# Patient Record
Sex: MALE | Race: WHITE | NOT HISPANIC OR LATINO | URBAN - METROPOLITAN AREA
[De-identification: names, ages, dates, MRNs, and addresses within clinical notes are randomized per-mention and may not be internally consistent; named-entity substitution may affect disease eponyms.]

---

## 2017-01-18 ENCOUNTER — EMERGENCY (EMERGENCY)
Facility: HOSPITAL | Age: 82
LOS: 1 days | Discharge: HOME | End: 2017-01-18
Attending: EMERGENCY MEDICINE | Admitting: EMERGENCY MEDICINE
Payer: MEDICARE

## 2017-01-18 VITALS
HEART RATE: 78 BPM | DIASTOLIC BLOOD PRESSURE: 78 MMHG | OXYGEN SATURATION: 100 % | SYSTOLIC BLOOD PRESSURE: 156 MMHG | RESPIRATION RATE: 18 BRPM

## 2017-01-18 VITALS
TEMPERATURE: 98 F | SYSTOLIC BLOOD PRESSURE: 166 MMHG | RESPIRATION RATE: 19 BRPM | OXYGEN SATURATION: 97 % | HEART RATE: 70 BPM | DIASTOLIC BLOOD PRESSURE: 82 MMHG

## 2017-01-18 DIAGNOSIS — E78.00 PURE HYPERCHOLESTEROLEMIA, UNSPECIFIED: ICD-10-CM

## 2017-01-18 DIAGNOSIS — F02.80 DEMENTIA IN OTHER DISEASES CLASSIFIED ELSEWHERE, UNSPECIFIED SEVERITY, WITHOUT BEHAVIORAL DISTURBANCE, PSYCHOTIC DISTURBANCE, MOOD DISTURBANCE, AND ANXIETY: ICD-10-CM

## 2017-01-18 DIAGNOSIS — I10 ESSENTIAL (PRIMARY) HYPERTENSION: ICD-10-CM

## 2017-01-18 DIAGNOSIS — Z79.899 OTHER LONG TERM (CURRENT) DRUG THERAPY: ICD-10-CM

## 2017-01-18 DIAGNOSIS — Z79.82 LONG TERM (CURRENT) USE OF ASPIRIN: ICD-10-CM

## 2017-01-18 DIAGNOSIS — R53.1 WEAKNESS: ICD-10-CM

## 2017-01-18 DIAGNOSIS — Z87.891 PERSONAL HISTORY OF NICOTINE DEPENDENCE: ICD-10-CM

## 2017-01-18 DIAGNOSIS — Z88.5 ALLERGY STATUS TO NARCOTIC AGENT: ICD-10-CM

## 2017-01-18 DIAGNOSIS — E78.5 HYPERLIPIDEMIA, UNSPECIFIED: ICD-10-CM
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
## 2017-01-18 LAB
ALBUMIN SERPL ELPH-MCNC: 4.1 G/DL — SIGNIFICANT CHANGE UP (ref 3.3–5)
ALP SERPL-CCNC: 85 U/L — SIGNIFICANT CHANGE UP (ref 40–120)
ALT FLD-CCNC: 15 U/L RC — SIGNIFICANT CHANGE UP (ref 10–45)
ANION GAP SERPL CALC-SCNC: 13 MMOL/L — SIGNIFICANT CHANGE UP (ref 5–17)
APPEARANCE UR: ABNORMAL
AST SERPL-CCNC: 23 U/L — SIGNIFICANT CHANGE UP (ref 10–40)
BASOPHILS # BLD AUTO: 0 K/UL — SIGNIFICANT CHANGE UP (ref 0–0.2)
BASOPHILS NFR BLD AUTO: 0.3 % — SIGNIFICANT CHANGE UP (ref 0–2)
BILIRUB SERPL-MCNC: 0.7 MG/DL — SIGNIFICANT CHANGE UP (ref 0.2–1.2)
BILIRUB UR-MCNC: NEGATIVE — SIGNIFICANT CHANGE UP
BUN SERPL-MCNC: 30 MG/DL — HIGH (ref 7–23)
CALCIUM SERPL-MCNC: 9.2 MG/DL — SIGNIFICANT CHANGE UP (ref 8.4–10.5)
CHLORIDE SERPL-SCNC: 104 MMOL/L — SIGNIFICANT CHANGE UP (ref 96–108)
CK MB BLD-MCNC: 2 % — SIGNIFICANT CHANGE UP (ref 0–3.5)
CK MB CFR SERPL CALC: 3.6 NG/ML — SIGNIFICANT CHANGE UP (ref 0–6.7)
CK SERPL-CCNC: 182 U/L — SIGNIFICANT CHANGE UP (ref 30–200)
CO2 SERPL-SCNC: 24 MMOL/L — SIGNIFICANT CHANGE UP (ref 22–31)
COLOR SPEC: SIGNIFICANT CHANGE UP
CREAT SERPL-MCNC: 1.18 MG/DL — SIGNIFICANT CHANGE UP (ref 0.5–1.3)
DIFF PNL FLD: NEGATIVE — SIGNIFICANT CHANGE UP
EOSINOPHIL # BLD AUTO: 0.1 K/UL — SIGNIFICANT CHANGE UP (ref 0–0.5)
EOSINOPHIL NFR BLD AUTO: 0.5 % — SIGNIFICANT CHANGE UP (ref 0–6)
EPI CELLS # UR: SIGNIFICANT CHANGE UP /HPF
GLUCOSE SERPL-MCNC: 109 MG/DL — HIGH (ref 70–99)
GLUCOSE UR QL: NEGATIVE — SIGNIFICANT CHANGE UP
HCT VFR BLD CALC: 40 % — SIGNIFICANT CHANGE UP (ref 39–50)
HGB BLD-MCNC: 13.8 G/DL — SIGNIFICANT CHANGE UP (ref 13–17)
KETONES UR-MCNC: NEGATIVE — SIGNIFICANT CHANGE UP
LEUKOCYTE ESTERASE UR-ACNC: NEGATIVE — SIGNIFICANT CHANGE UP
LYMPHOCYTES # BLD AUTO: 1.2 K/UL — SIGNIFICANT CHANGE UP (ref 1–3.3)
LYMPHOCYTES # BLD AUTO: 12.5 % — LOW (ref 13–44)
MCHC RBC-ENTMCNC: 34 PG — SIGNIFICANT CHANGE UP (ref 27–34)
MCHC RBC-ENTMCNC: 34.6 GM/DL — SIGNIFICANT CHANGE UP (ref 32–36)
MCV RBC AUTO: 98.1 FL — SIGNIFICANT CHANGE UP (ref 80–100)
MONOCYTES # BLD AUTO: 1 K/UL — HIGH (ref 0–0.9)
MONOCYTES NFR BLD AUTO: 10.1 % — SIGNIFICANT CHANGE UP (ref 2–14)
NEUTROPHILS # BLD AUTO: 7.3 K/UL — SIGNIFICANT CHANGE UP (ref 1.8–7.4)
NEUTROPHILS NFR BLD AUTO: 76.6 % — SIGNIFICANT CHANGE UP (ref 43–77)
NITRITE UR-MCNC: NEGATIVE — SIGNIFICANT CHANGE UP
NT-PROBNP SERPL-SCNC: 470 PG/ML — HIGH (ref 0–300)
PH UR: 7 — SIGNIFICANT CHANGE UP (ref 4.8–8)
PLATELET # BLD AUTO: 135 K/UL — LOW (ref 150–400)
POTASSIUM SERPL-MCNC: 4.6 MMOL/L — SIGNIFICANT CHANGE UP (ref 3.5–5.3)
POTASSIUM SERPL-SCNC: 4.6 MMOL/L — SIGNIFICANT CHANGE UP (ref 3.5–5.3)
PROT SERPL-MCNC: 7.5 G/DL — SIGNIFICANT CHANGE UP (ref 6–8.3)
PROT UR-MCNC: SIGNIFICANT CHANGE UP
RBC # BLD: 4.07 M/UL — LOW (ref 4.2–5.8)
RBC # FLD: 13.4 % — SIGNIFICANT CHANGE UP (ref 10.3–14.5)
RBC CASTS # UR COMP ASSIST: SIGNIFICANT CHANGE UP /HPF (ref 0–2)
SODIUM SERPL-SCNC: 141 MMOL/L — SIGNIFICANT CHANGE UP (ref 135–145)
SP GR SPEC: 1.02 — SIGNIFICANT CHANGE UP (ref 1.01–1.02)
TROPONIN T SERPL-MCNC: <0.01 NG/ML — SIGNIFICANT CHANGE UP (ref 0–0.06)
UROBILINOGEN FLD QL: NEGATIVE — SIGNIFICANT CHANGE UP
WBC # BLD: 9.5 K/UL — SIGNIFICANT CHANGE UP (ref 3.8–10.5)
WBC # FLD AUTO: 9.5 K/UL — SIGNIFICANT CHANGE UP (ref 3.8–10.5)
WBC UR QL: SIGNIFICANT CHANGE UP /HPF (ref 0–5)

## 2017-01-18 PROCEDURE — 81001 URINALYSIS AUTO W/SCOPE: CPT

## 2017-01-18 PROCEDURE — 82553 CREATINE MB FRACTION: CPT

## 2017-01-18 PROCEDURE — 71010: CPT | Mod: 26

## 2017-01-18 PROCEDURE — 80053 COMPREHEN METABOLIC PANEL: CPT

## 2017-01-18 PROCEDURE — 71045 X-RAY EXAM CHEST 1 VIEW: CPT

## 2017-01-18 PROCEDURE — 84484 ASSAY OF TROPONIN QUANT: CPT

## 2017-01-18 PROCEDURE — 85027 COMPLETE CBC AUTOMATED: CPT

## 2017-01-18 PROCEDURE — 93971 EXTREMITY STUDY: CPT | Mod: 26

## 2017-01-18 PROCEDURE — 93005 ELECTROCARDIOGRAM TRACING: CPT

## 2017-01-18 PROCEDURE — 83880 ASSAY OF NATRIURETIC PEPTIDE: CPT

## 2017-01-18 PROCEDURE — 72170 X-RAY EXAM OF PELVIS: CPT

## 2017-01-18 PROCEDURE — 72170 X-RAY EXAM OF PELVIS: CPT | Mod: 26

## 2017-01-18 PROCEDURE — 82550 ASSAY OF CK (CPK): CPT

## 2017-01-18 PROCEDURE — 99284 EMERGENCY DEPT VISIT MOD MDM: CPT | Mod: 25

## 2017-01-18 PROCEDURE — 93971 EXTREMITY STUDY: CPT

## 2017-01-18 PROCEDURE — 99285 EMERGENCY DEPT VISIT HI MDM: CPT

## 2017-01-18 RX ORDER — SODIUM CHLORIDE 9 MG/ML
500 INJECTION INTRAMUSCULAR; INTRAVENOUS; SUBCUTANEOUS ONCE
Qty: 0 | Refills: 0 | Status: COMPLETED | OUTPATIENT
Start: 2017-01-18 | End: 2017-01-18

## 2017-01-18 RX ADMIN — SODIUM CHLORIDE 500 MILLILITER(S): 9 INJECTION INTRAMUSCULAR; INTRAVENOUS; SUBCUTANEOUS at 12:04

## 2017-01-18 NOTE — ED ADULT NURSE NOTE - PMH
Atrial fibrillation    Back pain    CAD (Coronary Artery Disease)    Dementia    Fall    HTN - Hypertension    Hypercholesteremia    UTI (urinary tract infection)  MDR E.coli

## 2017-01-18 NOTE — ED ADULT NURSE NOTE - OBJECTIVE STATEMENT
88 year old male brought in by ambulance a/ox3 currently to person, place and situation, with forgetfulness;  pmh baseline a/ox2 with dementia presenting to ed from [facility] assisted living s/p fall. no paperwork sent with patient from assisted living. patient states he was sitting in chair yesterday when he fell off. states someone helped him up. unable to verbalize how he fell, how long he was on the floor for, or who picked him up. currently denies any pain or discomfort when asked, denies n/v/cp/sob/dyspnea denies abd pain. unable to verbalize bowel/bladder pattern. respirations even unlabored no sob/dyspnea currently. noted with +1 pitting edema to right lower extremity. patient denies any pain to rle.

## 2017-01-18 NOTE — ED ADULT NURSE REASSESSMENT NOTE - NS ED NURSE REASSESS COMMENT FT1
sandwich and drink provided for patient, labs sent, fluids infusing, waiting for X-rays will continue to monitor.

## 2017-01-18 NOTE — ED PROVIDER NOTE - ATTENDING CONTRIBUTION TO CARE
I, Dr. Eh Zhong, interviewed the patient and performed a physical exam and spoke to the resident about the plan of care for this patient.  Pt with reported fall twice in NH, no complaints.

## 2017-01-18 NOTE — ED PROVIDER NOTE - OBJECTIVE STATEMENT
88M with PMHx of dementia, Afib not on AC, CAD s/p stents, HTN, HLD, BIBEMS after having a fall yesterday. Patient is poor historian and unable to provide an appropriate 88M with PMHx of dementia, Afib not on AC, CAD s/p stents, HTN, HLD, BIBEMS after having a fall yesterday. Patient is poor historian and unable to provide an appropriate history for recent fall, past medical history. Patient is alert and oriented x 2-3, and answering questions appropriately. Describes having difficulty with memory. States that he fell/slid off chair while waiting to be boosted up. Denies trauma to head, extremities. Denies chest pain/palpitations at the time. No LOC or seizure like activity. Does not remember other details, however, is unclear why he was sent in to the ED today. Denies pain currently, no headache, recent fevers, nausea, vomiting, diarrhea, constipation, difficulty with urination. Does report increased frequency of urination. States that he is hungry. Reports that several members of the assisted living facility in which he lives are also currently sick.

## 2017-01-18 NOTE — ED PROVIDER NOTE - HEAD, MLM
Head is atraumatic. Head shape is symmetrical. small healed abrasion on left forehead. no report of trauma

## 2017-01-18 NOTE — ED PROVIDER NOTE - PROGRESS NOTE DETAILS
Dr. Zhong Note: spoke to patient's son, Antonio Power, updated on results and likely discharge, okay with plan, cell: [phone] Dr. Zhong Note: u/s neg, stable for dc, ambulutte.

## 2017-01-18 NOTE — ED PROVIDER NOTE - CARE PLAN
Principal Discharge DX:	Fall, initial encounter  Secondary Diagnosis:	General weakness  Secondary Diagnosis:	Dementia Principal Discharge DX:	Fall, initial encounter  Goal:	maintain safety in assisted living facility  Secondary Diagnosis:	General weakness  Secondary Diagnosis:	Dementia

## 2017-04-12 ENCOUNTER — APPOINTMENT (OUTPATIENT)
Dept: CARDIOLOGY | Facility: CLINIC | Age: 82
End: 2017-04-12

## 2017-06-14 ENCOUNTER — NON-APPOINTMENT (OUTPATIENT)
Age: 82
End: 2017-06-14

## 2017-06-14 ENCOUNTER — APPOINTMENT (OUTPATIENT)
Dept: CARDIOLOGY | Facility: CLINIC | Age: 82
End: 2017-06-14

## 2017-06-14 VITALS
HEART RATE: 66 BPM | OXYGEN SATURATION: 97 % | HEIGHT: 71 IN | RESPIRATION RATE: 14 BRPM | BODY MASS INDEX: 25.9 KG/M2 | DIASTOLIC BLOOD PRESSURE: 77 MMHG | SYSTOLIC BLOOD PRESSURE: 129 MMHG | WEIGHT: 185 LBS

## 2017-06-14 DIAGNOSIS — I34.0 NONRHEUMATIC MITRAL (VALVE) INSUFFICIENCY: ICD-10-CM

## 2017-08-02 ENCOUNTER — EMERGENCY (EMERGENCY)
Facility: HOSPITAL | Age: 82
LOS: 1 days | Discharge: HOME | End: 2017-08-02
Attending: PERSONAL EMERGENCY RESPONSE ATTENDANT | Admitting: PERSONAL EMERGENCY RESPONSE ATTENDANT
Payer: MEDICARE

## 2017-08-02 VITALS
SYSTOLIC BLOOD PRESSURE: 145 MMHG | RESPIRATION RATE: 16 BRPM | DIASTOLIC BLOOD PRESSURE: 76 MMHG | HEART RATE: 56 BPM | OXYGEN SATURATION: 99 %

## 2017-08-02 VITALS
RESPIRATION RATE: 17 BRPM | SYSTOLIC BLOOD PRESSURE: 151 MMHG | DIASTOLIC BLOOD PRESSURE: 77 MMHG | OXYGEN SATURATION: 99 % | TEMPERATURE: 98 F | HEART RATE: 60 BPM

## 2017-08-02 LAB
ALBUMIN SERPL ELPH-MCNC: 3.8 G/DL — SIGNIFICANT CHANGE UP (ref 3.3–5)
ALP SERPL-CCNC: 68 U/L — SIGNIFICANT CHANGE UP (ref 40–120)
ALT FLD-CCNC: 14 U/L RC — SIGNIFICANT CHANGE UP (ref 10–45)
ANION GAP SERPL CALC-SCNC: 13 MMOL/L — SIGNIFICANT CHANGE UP (ref 5–17)
APPEARANCE UR: CLEAR — SIGNIFICANT CHANGE UP
AST SERPL-CCNC: 19 U/L — SIGNIFICANT CHANGE UP (ref 10–40)
BASOPHILS # BLD AUTO: 0 K/UL — SIGNIFICANT CHANGE UP (ref 0–0.2)
BASOPHILS NFR BLD AUTO: 0.5 % — SIGNIFICANT CHANGE UP (ref 0–2)
BILIRUB SERPL-MCNC: 0.7 MG/DL — SIGNIFICANT CHANGE UP (ref 0.2–1.2)
BILIRUB UR-MCNC: NEGATIVE — SIGNIFICANT CHANGE UP
BUN SERPL-MCNC: 24 MG/DL — HIGH (ref 7–23)
CALCIUM SERPL-MCNC: 9.3 MG/DL — SIGNIFICANT CHANGE UP (ref 8.4–10.5)
CHLORIDE SERPL-SCNC: 105 MMOL/L — SIGNIFICANT CHANGE UP (ref 96–108)
CK MB BLD-MCNC: 2.6 % — SIGNIFICANT CHANGE UP (ref 0–3.5)
CK MB CFR SERPL CALC: 3.5 NG/ML — SIGNIFICANT CHANGE UP (ref 0–6.7)
CK SERPL-CCNC: 135 U/L — SIGNIFICANT CHANGE UP (ref 30–200)
CO2 SERPL-SCNC: 25 MMOL/L — SIGNIFICANT CHANGE UP (ref 22–31)
COLOR SPEC: SIGNIFICANT CHANGE UP
CREAT SERPL-MCNC: 1.15 MG/DL — SIGNIFICANT CHANGE UP (ref 0.5–1.3)
DIFF PNL FLD: NEGATIVE — SIGNIFICANT CHANGE UP
EOSINOPHIL # BLD AUTO: 0.1 K/UL — SIGNIFICANT CHANGE UP (ref 0–0.5)
EOSINOPHIL NFR BLD AUTO: 0.7 % — SIGNIFICANT CHANGE UP (ref 0–6)
GAS PNL BLDV: SIGNIFICANT CHANGE UP
GLUCOSE SERPL-MCNC: 90 MG/DL — SIGNIFICANT CHANGE UP (ref 70–99)
GLUCOSE UR QL: NEGATIVE — SIGNIFICANT CHANGE UP
HCT VFR BLD CALC: 38.5 % — LOW (ref 39–50)
HGB BLD-MCNC: 13.1 G/DL — SIGNIFICANT CHANGE UP (ref 13–17)
HYALINE CASTS # UR AUTO: ABNORMAL
INR BLD: 1.1 RATIO — SIGNIFICANT CHANGE UP (ref 0.88–1.16)
KETONES UR-MCNC: NEGATIVE — SIGNIFICANT CHANGE UP
LEUKOCYTE ESTERASE UR-ACNC: NEGATIVE — SIGNIFICANT CHANGE UP
LYMPHOCYTES # BLD AUTO: 1.6 K/UL — SIGNIFICANT CHANGE UP (ref 1–3.3)
LYMPHOCYTES # BLD AUTO: 16.6 % — SIGNIFICANT CHANGE UP (ref 13–44)
MCHC RBC-ENTMCNC: 34 GM/DL — SIGNIFICANT CHANGE UP (ref 32–36)
MCHC RBC-ENTMCNC: 34.2 PG — HIGH (ref 27–34)
MCV RBC AUTO: 100 FL — SIGNIFICANT CHANGE UP (ref 80–100)
MONOCYTES # BLD AUTO: 0.9 K/UL — SIGNIFICANT CHANGE UP (ref 0–0.9)
MONOCYTES NFR BLD AUTO: 10.1 % — SIGNIFICANT CHANGE UP (ref 2–14)
NEUTROPHILS # BLD AUTO: 6.8 K/UL — SIGNIFICANT CHANGE UP (ref 1.8–7.4)
NEUTROPHILS NFR BLD AUTO: 72.1 % — SIGNIFICANT CHANGE UP (ref 43–77)
NITRITE UR-MCNC: NEGATIVE — SIGNIFICANT CHANGE UP
PH UR: 7.5 — SIGNIFICANT CHANGE UP (ref 5–8)
PLATELET # BLD AUTO: 148 K/UL — LOW (ref 150–400)
POTASSIUM SERPL-MCNC: 4.3 MMOL/L — SIGNIFICANT CHANGE UP (ref 3.5–5.3)
POTASSIUM SERPL-SCNC: 4.3 MMOL/L — SIGNIFICANT CHANGE UP (ref 3.5–5.3)
PROT SERPL-MCNC: 6.9 G/DL — SIGNIFICANT CHANGE UP (ref 6–8.3)
PROT UR-MCNC: SIGNIFICANT CHANGE UP
PROTHROM AB SERPL-ACNC: 11.9 SEC — SIGNIFICANT CHANGE UP (ref 9.8–12.7)
RBC # BLD: 3.83 M/UL — LOW (ref 4.2–5.8)
RBC # FLD: 12.7 % — SIGNIFICANT CHANGE UP (ref 10.3–14.5)
RBC CASTS # UR COMP ASSIST: SIGNIFICANT CHANGE UP /HPF (ref 0–2)
SODIUM SERPL-SCNC: 143 MMOL/L — SIGNIFICANT CHANGE UP (ref 135–145)
SP GR SPEC: 1.02 — SIGNIFICANT CHANGE UP (ref 1.01–1.02)
TROPONIN T SERPL-MCNC: <0.01 NG/ML — SIGNIFICANT CHANGE UP (ref 0–0.06)
UROBILINOGEN FLD QL: NEGATIVE — SIGNIFICANT CHANGE UP
WBC # BLD: 9.4 K/UL — SIGNIFICANT CHANGE UP (ref 3.8–10.5)
WBC # FLD AUTO: 9.4 K/UL — SIGNIFICANT CHANGE UP (ref 3.8–10.5)
WBC UR QL: SIGNIFICANT CHANGE UP /HPF (ref 0–5)

## 2017-08-02 PROCEDURE — 82947 ASSAY GLUCOSE BLOOD QUANT: CPT

## 2017-08-02 PROCEDURE — 96374 THER/PROPH/DIAG INJ IV PUSH: CPT | Mod: XU

## 2017-08-02 PROCEDURE — 82330 ASSAY OF CALCIUM: CPT

## 2017-08-02 PROCEDURE — 87040 BLOOD CULTURE FOR BACTERIA: CPT

## 2017-08-02 PROCEDURE — 82803 BLOOD GASES ANY COMBINATION: CPT

## 2017-08-02 PROCEDURE — 82435 ASSAY OF BLOOD CHLORIDE: CPT

## 2017-08-02 PROCEDURE — 72125 CT NECK SPINE W/O DYE: CPT

## 2017-08-02 PROCEDURE — 51701 INSERT BLADDER CATHETER: CPT

## 2017-08-02 PROCEDURE — 85014 HEMATOCRIT: CPT

## 2017-08-02 PROCEDURE — 82550 ASSAY OF CK (CPK): CPT

## 2017-08-02 PROCEDURE — 72125 CT NECK SPINE W/O DYE: CPT | Mod: 26

## 2017-08-02 PROCEDURE — 80053 COMPREHEN METABOLIC PANEL: CPT

## 2017-08-02 PROCEDURE — 99285 EMERGENCY DEPT VISIT HI MDM: CPT

## 2017-08-02 PROCEDURE — 87086 URINE CULTURE/COLONY COUNT: CPT

## 2017-08-02 PROCEDURE — 70450 CT HEAD/BRAIN W/O DYE: CPT

## 2017-08-02 PROCEDURE — 71045 X-RAY EXAM CHEST 1 VIEW: CPT

## 2017-08-02 PROCEDURE — 99284 EMERGENCY DEPT VISIT MOD MDM: CPT | Mod: 25

## 2017-08-02 PROCEDURE — 82553 CREATINE MB FRACTION: CPT

## 2017-08-02 PROCEDURE — 84484 ASSAY OF TROPONIN QUANT: CPT

## 2017-08-02 PROCEDURE — 82565 ASSAY OF CREATININE: CPT

## 2017-08-02 PROCEDURE — 85610 PROTHROMBIN TIME: CPT

## 2017-08-02 PROCEDURE — 70450 CT HEAD/BRAIN W/O DYE: CPT | Mod: 26

## 2017-08-02 PROCEDURE — 84295 ASSAY OF SERUM SODIUM: CPT

## 2017-08-02 PROCEDURE — 85027 COMPLETE CBC AUTOMATED: CPT

## 2017-08-02 PROCEDURE — 81001 URINALYSIS AUTO W/SCOPE: CPT

## 2017-08-02 PROCEDURE — 83605 ASSAY OF LACTIC ACID: CPT

## 2017-08-02 PROCEDURE — 71010: CPT | Mod: 26

## 2017-08-02 PROCEDURE — 84132 ASSAY OF SERUM POTASSIUM: CPT

## 2017-08-02 RX ORDER — SODIUM CHLORIDE 9 MG/ML
1000 INJECTION INTRAMUSCULAR; INTRAVENOUS; SUBCUTANEOUS ONCE
Qty: 0 | Refills: 0 | Status: COMPLETED | OUTPATIENT
Start: 2017-08-02 | End: 2017-08-02

## 2017-08-02 RX ORDER — ACETAMINOPHEN 500 MG
1000 TABLET ORAL ONCE
Qty: 0 | Refills: 0 | Status: COMPLETED | OUTPATIENT
Start: 2017-08-02 | End: 2017-08-02

## 2017-08-02 RX ADMIN — SODIUM CHLORIDE 2000 MILLILITER(S): 9 INJECTION INTRAMUSCULAR; INTRAVENOUS; SUBCUTANEOUS at 10:27

## 2017-08-02 RX ADMIN — Medication 400 MILLIGRAM(S): at 11:21

## 2017-08-02 NOTE — ED ADULT NURSE REASSESSMENT NOTE - NS ED NURSE REASSESS COMMENT FT1
pt resting comfortably denies any pain at this time hard collor removed by md pts son contacted by phone states he willpick up patient around 5 pm pt pending discharge

## 2017-08-02 NOTE — ED PROVIDER NOTE - PROGRESS NOTE DETAILS
CT spine negative for fracture. Pt cleared from [place] [facility] collar with FROM neck and ability to stand independently. - Gregory Branham PA-C Attending MD Wills.  Pt is in no distress.  C-collar cleared clinically after non-actionable imaging.  PT has a stage II sacral decub without evidence of active infection.  Facility where pt lives was called and is aware of planned d/c.  Pt fell last night, denies CP at this time.  Troponin, EKG non-actionable.  Son called who is en route to pick pt up and can stay with him overnight.  Will discuss plan of care further with pt and son when son arrives.  No planned further work-up at this time. I spoke with Dr. Franco regarding pt discharge plan. Doctor agrees as long is pt is able to ambulate with assist and will see in office next week.  - KAMILLE MagañaC Pt is able to ambulate with walker, and his son will take him home and stay with him overnight.    - KAMILLE MagañaC

## 2017-08-02 NOTE — ED ADULT NURSE NOTE - OBJECTIVE STATEMENT
pt sent from [facility] found on floor this morning aide with patient till 8pm last night pt finger stick 81mcg on arrival skin warm dry pt c/o left neck pain with hard collor applied on arrival pt with no recall of event preceding his arrival sinus breadycardia on moniter pt with stage 2 decubitus to sacral area no drainage [pt incontinent of urine on arrival cleaned placed on cardaic monitering pending md jiménez

## 2017-08-02 NOTE — ED PROVIDER NOTE - PHYSICAL EXAMINATION
87yo M A&Ox3, no apparent distress. Mild Parkinsonian tremors noted. No obvious sign of injury or deformity. +ttp midline c spine. 2nd degree sacral decubitus ulcer cleanly bandaged. Heart RRR no murmur. Lungs CTA b/l. Abdomen soft nontender nondistended, no CVAT. Peripheral pulses present and equal. 89yo M A&Ox3, no apparent distress. CN 2-12 grossly intact. No focal neurologic deficit. Mild Parkinsonian tremors noted. No obvious sign of injury or deformity. +ttp midline c spine. 2nd degree sacral decubitus ulcer cleanly bandaged. Heart RRR no murmur. Lungs CTA b/l. Abdomen soft nontender nondistended, no CVAT. Peripheral pulses present and equal.

## 2017-08-02 NOTE — ED PROVIDER NOTE - ATTENDING CONTRIBUTION TO CARE
Attending MD Wills.  Pt is a presenting after a fall last night ~11 pm.  Pt endorses lower back pain.  [facility] assisted [facility].  Pt stated he fell ~11 pm.  Pt found on floor this morning.  Pt is A & O x 3 but doesn't remember exactly what happened.  Pt endorses some blurred vision. No AC on board.  Pt has parkinson's disease.  Pt has no focal areas of TTP other than midline and bilateral paraspinal TTP.  Pt has a stage II sacral decub.  No CP/abdominal TTP.  No LE shortening/rotation.  Neurovascularly intact in all distal extremities.  Stable vital signs.  Pt is in no acute distress. No external evidence of head injury.  CK is not elevated.  Initial labs non-actionable.

## 2017-08-02 NOTE — ED PROVIDER NOTE - PLAN OF CARE
- Follow up with your primary care physician tomorrow  - Take Tylenol 975mg every 6 hours as needed for pain  - Return to ER if you experience worsening pain, loss of sensation or ability to move, or sign of infection such as high fever

## 2017-08-02 NOTE — ED ADULT NURSE REASSESSMENT NOTE - NS ED NURSE REASSESS COMMENT FT1
pt straight cath aseptcically with 400ml urine drained amara colored pt toleraterd procedure well pending radiology studies

## 2017-08-02 NOTE — ED PROVIDER NOTE - OBJECTIVE STATEMENT
87 yo M 87 yo M BIBA from assisted living after falling unwitnessed last night, discovered on his floor by aide this morning. 87 yo M BIBA from assisted living after falling unwitnessed last night, discovered on his floor by aide this morning. Pt is poor historian and arrived to ED without paperwork. Reports PMHx Parkinson's. PCP Jimmy Franco. c/o neck pain. Pt denies anticoagulants. Denies fever, chills, recent illness, abdominal pain, rib pain, any obvious signs of injury. Reports sacral decubitus ulcer.

## 2017-08-02 NOTE — ED PROVIDER NOTE - CARE PLAN
Principal Discharge DX:	Back pain  Secondary Diagnosis:	Fall from standing, initial encounter Principal Discharge DX:	Back pain  Instructions for follow-up, activity and diet:	- Follow up with your primary care physician tomorrow  - Take Tylenol 975mg every 6 hours as needed for pain  - Return to ER if you experience worsening pain, loss of sensation or ability to move, or sign of infection such as high fever  Secondary Diagnosis:	Fall from standing, initial encounter

## 2017-08-03 LAB
CULTURE RESULTS: NO GROWTH — SIGNIFICANT CHANGE UP
SPECIMEN SOURCE: SIGNIFICANT CHANGE UP

## 2017-08-07 LAB
CULTURE RESULTS: SIGNIFICANT CHANGE UP
CULTURE RESULTS: SIGNIFICANT CHANGE UP
SPECIMEN SOURCE: SIGNIFICANT CHANGE UP
SPECIMEN SOURCE: SIGNIFICANT CHANGE UP

## 2018-02-21 ENCOUNTER — APPOINTMENT (OUTPATIENT)
Dept: CARDIOLOGY | Facility: CLINIC | Age: 83
End: 2018-02-21

## 2018-03-20 ENCOUNTER — MEDICATION RENEWAL (OUTPATIENT)
Age: 83
End: 2018-03-20

## 2018-03-20 RX ORDER — RIVASTIGMINE 9.5 MG/24H
9.5 PATCH, EXTENDED RELEASE TRANSDERMAL
Refills: 0 | Status: ACTIVE | COMMUNITY

## 2018-03-20 RX ORDER — CLOPIDOGREL BISULFATE 75 MG/1
75 TABLET, FILM COATED ORAL DAILY
Qty: 90 | Refills: 3 | Status: ACTIVE | COMMUNITY
Start: 1900-01-01 | End: 1900-01-01

## 2018-05-02 ENCOUNTER — NON-APPOINTMENT (OUTPATIENT)
Age: 83
End: 2018-05-02

## 2018-05-02 ENCOUNTER — APPOINTMENT (OUTPATIENT)
Dept: CARDIOLOGY | Facility: CLINIC | Age: 83
End: 2018-05-02
Payer: MEDICARE

## 2018-05-02 VITALS
WEIGHT: 185 LBS | DIASTOLIC BLOOD PRESSURE: 77 MMHG | BODY MASS INDEX: 25.9 KG/M2 | HEART RATE: 74 BPM | SYSTOLIC BLOOD PRESSURE: 135 MMHG | OXYGEN SATURATION: 97 % | HEIGHT: 71 IN | RESPIRATION RATE: 14 BRPM

## 2018-05-02 DIAGNOSIS — N18.3 CHRONIC KIDNEY DISEASE, STAGE 3 (MODERATE): ICD-10-CM

## 2018-05-02 PROCEDURE — 99214 OFFICE O/P EST MOD 30 MIN: CPT

## 2018-05-02 PROCEDURE — 93000 ELECTROCARDIOGRAM COMPLETE: CPT

## 2018-05-04 PROBLEM — N18.3 CKD (CHRONIC KIDNEY DISEASE), STAGE III: Status: ACTIVE | Noted: 2018-05-04

## 2018-05-08 NOTE — ED ADULT TRIAGE NOTE - ESI TRIAGE ACUITY LEVEL, MLM
Pt was seen today and tested negative for strep.  After nap at 2:00, temp was 103.7.  Mom gave motrin and now it is down to 102.9.  Mom is wondering what kind of cough med she can give her for her cough.  She is also wondering, what temp is too high.    Lynette Copeland CMA ([facility])......................5/8/2018  2:36 PM    3

## 2018-05-10 ENCOUNTER — INPATIENT (INPATIENT)
Facility: HOSPITAL | Age: 83
LOS: 3 days | Discharge: HOME | DRG: 312 | End: 2018-05-14
Attending: INTERNAL MEDICINE | Admitting: INTERNAL MEDICINE
Payer: MEDICARE

## 2018-05-10 VITALS
SYSTOLIC BLOOD PRESSURE: 155 MMHG | RESPIRATION RATE: 20 BRPM | OXYGEN SATURATION: 98 % | HEART RATE: 70 BPM | DIASTOLIC BLOOD PRESSURE: 83 MMHG

## 2018-05-10 DIAGNOSIS — I10 ESSENTIAL (PRIMARY) HYPERTENSION: ICD-10-CM

## 2018-05-10 DIAGNOSIS — N39.0 URINARY TRACT INFECTION, SITE NOT SPECIFIED: ICD-10-CM

## 2018-05-10 DIAGNOSIS — I48.91 UNSPECIFIED ATRIAL FIBRILLATION: ICD-10-CM

## 2018-05-10 DIAGNOSIS — R55 SYNCOPE AND COLLAPSE: ICD-10-CM

## 2018-05-10 DIAGNOSIS — F03.90 UNSPECIFIED DEMENTIA WITHOUT BEHAVIORAL DISTURBANCE: ICD-10-CM

## 2018-05-10 LAB
ALBUMIN SERPL ELPH-MCNC: 4.1 G/DL — SIGNIFICANT CHANGE UP (ref 3.3–5)
ALP SERPL-CCNC: 75 U/L — SIGNIFICANT CHANGE UP (ref 40–120)
ALT FLD-CCNC: 13 U/L — SIGNIFICANT CHANGE UP (ref 10–45)
ANION GAP SERPL CALC-SCNC: 14 MMOL/L — SIGNIFICANT CHANGE UP (ref 5–17)
APTT BLD: 26.4 SEC — LOW (ref 27.5–37.4)
AST SERPL-CCNC: 19 U/L — SIGNIFICANT CHANGE UP (ref 10–40)
BASOPHILS # BLD AUTO: 0.1 K/UL — SIGNIFICANT CHANGE UP (ref 0–0.2)
BASOPHILS NFR BLD AUTO: 1 % — SIGNIFICANT CHANGE UP (ref 0–2)
BILIRUB SERPL-MCNC: 0.4 MG/DL — SIGNIFICANT CHANGE UP (ref 0.2–1.2)
BUN SERPL-MCNC: 27 MG/DL — HIGH (ref 7–23)
CALCIUM SERPL-MCNC: 9.5 MG/DL — SIGNIFICANT CHANGE UP (ref 8.4–10.5)
CHLORIDE SERPL-SCNC: 105 MMOL/L — SIGNIFICANT CHANGE UP (ref 96–108)
CK MB BLD-MCNC: 2.8 % — SIGNIFICANT CHANGE UP (ref 0–3.5)
CK MB CFR SERPL CALC: 2.9 NG/ML — SIGNIFICANT CHANGE UP (ref 0–6.7)
CK SERPL-CCNC: 102 U/L — SIGNIFICANT CHANGE UP (ref 30–200)
CO2 SERPL-SCNC: 23 MMOL/L — SIGNIFICANT CHANGE UP (ref 22–31)
CREAT SERPL-MCNC: 1.39 MG/DL — HIGH (ref 0.5–1.3)
EOSINOPHIL # BLD AUTO: 0.2 K/UL — SIGNIFICANT CHANGE UP (ref 0–0.5)
EOSINOPHIL NFR BLD AUTO: 2.5 % — SIGNIFICANT CHANGE UP (ref 0–6)
ETHANOL SERPL-MCNC: SIGNIFICANT CHANGE UP MG/DL (ref 0–10)
GLUCOSE SERPL-MCNC: 109 MG/DL — HIGH (ref 70–99)
HCT VFR BLD CALC: 40.1 % — SIGNIFICANT CHANGE UP (ref 39–50)
HGB BLD-MCNC: 13.2 G/DL — SIGNIFICANT CHANGE UP (ref 13–17)
INR BLD: 1.07 RATIO — SIGNIFICANT CHANGE UP (ref 0.88–1.16)
LIDOCAIN IGE QN: 28 U/L — SIGNIFICANT CHANGE UP (ref 7–60)
LYMPHOCYTES # BLD AUTO: 1.8 K/UL — SIGNIFICANT CHANGE UP (ref 1–3.3)
LYMPHOCYTES # BLD AUTO: 24.3 % — SIGNIFICANT CHANGE UP (ref 13–44)
MCHC RBC-ENTMCNC: 32.9 PG — SIGNIFICANT CHANGE UP (ref 27–34)
MCHC RBC-ENTMCNC: 33 GM/DL — SIGNIFICANT CHANGE UP (ref 32–36)
MCV RBC AUTO: 99.7 FL — SIGNIFICANT CHANGE UP (ref 80–100)
MONOCYTES # BLD AUTO: 0.9 K/UL — SIGNIFICANT CHANGE UP (ref 0–0.9)
MONOCYTES NFR BLD AUTO: 11.8 % — SIGNIFICANT CHANGE UP (ref 2–14)
NEUTROPHILS # BLD AUTO: 4.5 K/UL — SIGNIFICANT CHANGE UP (ref 1.8–7.4)
NEUTROPHILS NFR BLD AUTO: 60.3 % — SIGNIFICANT CHANGE UP (ref 43–77)
PLATELET # BLD AUTO: 165 K/UL — SIGNIFICANT CHANGE UP (ref 150–400)
POTASSIUM SERPL-MCNC: 4.9 MMOL/L — SIGNIFICANT CHANGE UP (ref 3.5–5.3)
POTASSIUM SERPL-SCNC: 4.9 MMOL/L — SIGNIFICANT CHANGE UP (ref 3.5–5.3)
PROT SERPL-MCNC: 7.2 G/DL — SIGNIFICANT CHANGE UP (ref 6–8.3)
PROTHROM AB SERPL-ACNC: 11.6 SEC — SIGNIFICANT CHANGE UP (ref 9.8–12.7)
RBC # BLD: 4.02 M/UL — LOW (ref 4.2–5.8)
RBC # FLD: 12.9 % — SIGNIFICANT CHANGE UP (ref 10.3–14.5)
SODIUM SERPL-SCNC: 142 MMOL/L — SIGNIFICANT CHANGE UP (ref 135–145)
TROPONIN T SERPL-MCNC: <0.01 NG/ML — SIGNIFICANT CHANGE UP (ref 0–0.06)
WBC # BLD: 7.5 K/UL — SIGNIFICANT CHANGE UP (ref 3.8–10.5)
WBC # FLD AUTO: 7.5 K/UL — SIGNIFICANT CHANGE UP (ref 3.8–10.5)

## 2018-05-10 PROCEDURE — 70450 CT HEAD/BRAIN W/O DYE: CPT | Mod: 26

## 2018-05-10 PROCEDURE — 71045 X-RAY EXAM CHEST 1 VIEW: CPT | Mod: 26

## 2018-05-10 PROCEDURE — 73502 X-RAY EXAM HIP UNI 2-3 VIEWS: CPT | Mod: 26,RT

## 2018-05-10 PROCEDURE — 93010 ELECTROCARDIOGRAM REPORT: CPT

## 2018-05-10 PROCEDURE — 99285 EMERGENCY DEPT VISIT HI MDM: CPT | Mod: 25

## 2018-05-10 PROCEDURE — 72125 CT NECK SPINE W/O DYE: CPT | Mod: 26

## 2018-05-10 RX ORDER — FUROSEMIDE 40 MG
40 TABLET ORAL DAILY
Qty: 0 | Refills: 0 | Status: DISCONTINUED | OUTPATIENT
Start: 2018-05-10 | End: 2018-05-13

## 2018-05-10 RX ORDER — ATORVASTATIN CALCIUM 80 MG/1
10 TABLET, FILM COATED ORAL AT BEDTIME
Qty: 0 | Refills: 0 | Status: DISCONTINUED | OUTPATIENT
Start: 2018-05-10 | End: 2018-05-14

## 2018-05-10 RX ORDER — RIVASTIGMINE 4.6 MG/24H
1 PATCH, EXTENDED RELEASE TRANSDERMAL EVERY 24 HOURS
Qty: 0 | Refills: 0 | Status: DISCONTINUED | OUTPATIENT
Start: 2018-05-10 | End: 2018-05-14

## 2018-05-10 RX ORDER — SODIUM CHLORIDE 9 MG/ML
1000 INJECTION INTRAMUSCULAR; INTRAVENOUS; SUBCUTANEOUS ONCE
Qty: 0 | Refills: 0 | Status: COMPLETED | OUTPATIENT
Start: 2018-05-10 | End: 2018-05-10

## 2018-05-10 RX ORDER — METOPROLOL TARTRATE 50 MG
25 TABLET ORAL DAILY
Qty: 0 | Refills: 0 | Status: DISCONTINUED | OUTPATIENT
Start: 2018-05-10 | End: 2018-05-14

## 2018-05-10 RX ORDER — MIRABEGRON 50 MG/1
50 TABLET, EXTENDED RELEASE ORAL DAILY
Qty: 0 | Refills: 0 | Status: DISCONTINUED | OUTPATIENT
Start: 2018-05-10 | End: 2018-05-14

## 2018-05-10 RX ORDER — HEPARIN SODIUM 5000 [USP'U]/ML
5000 INJECTION INTRAVENOUS; SUBCUTANEOUS EVERY 12 HOURS
Qty: 0 | Refills: 0 | Status: DISCONTINUED | OUTPATIENT
Start: 2018-05-10 | End: 2018-05-14

## 2018-05-10 RX ORDER — CLOPIDOGREL BISULFATE 75 MG/1
75 TABLET, FILM COATED ORAL DAILY
Qty: 0 | Refills: 0 | Status: DISCONTINUED | OUTPATIENT
Start: 2018-05-10 | End: 2018-05-14

## 2018-05-10 RX ORDER — ACETAMINOPHEN 500 MG
1000 TABLET ORAL ONCE
Qty: 0 | Refills: 0 | Status: COMPLETED | OUTPATIENT
Start: 2018-05-10 | End: 2018-05-10

## 2018-05-10 RX ORDER — OMEGA-3 ACID ETHYL ESTERS 1 G
2 CAPSULE ORAL
Qty: 0 | Refills: 0 | Status: DISCONTINUED | OUTPATIENT
Start: 2018-05-10 | End: 2018-05-14

## 2018-05-10 RX ADMIN — Medication 25 MILLIGRAM(S): at 18:25

## 2018-05-10 RX ADMIN — Medication 1000 MILLIGRAM(S): at 05:30

## 2018-05-10 RX ADMIN — Medication 400 MILLIGRAM(S): at 04:30

## 2018-05-10 RX ADMIN — ATORVASTATIN CALCIUM 10 MILLIGRAM(S): 80 TABLET, FILM COATED ORAL at 21:46

## 2018-05-10 RX ADMIN — RIVASTIGMINE 1 PATCH: 4.6 PATCH, EXTENDED RELEASE TRANSDERMAL at 21:47

## 2018-05-10 RX ADMIN — SODIUM CHLORIDE 2000 MILLILITER(S): 9 INJECTION INTRAMUSCULAR; INTRAVENOUS; SUBCUTANEOUS at 04:35

## 2018-05-10 RX ADMIN — MIRABEGRON 50 MILLIGRAM(S): 50 TABLET, EXTENDED RELEASE ORAL at 21:46

## 2018-05-10 RX ADMIN — HEPARIN SODIUM 5000 UNIT(S): 5000 INJECTION INTRAVENOUS; SUBCUTANEOUS at 21:46

## 2018-05-10 RX ADMIN — CLOPIDOGREL BISULFATE 75 MILLIGRAM(S): 75 TABLET, FILM COATED ORAL at 18:25

## 2018-05-10 NOTE — ED PROVIDER NOTE - MEDICAL DECISION MAKING DETAILS
Rey Osei MD (resident): unknown mechanism of fall w/ head injury, and neck pain. pt on plavix. will get CT and XR imaging. pain control. work-up for syncope and BLE swelling. Rey Osei MD (resident): unknown mechanism of fall w/ head injury, and neck pain. pt on plavix. will get CT and XR imaging. pain control. work-up for syncope and BLE swelling. pt w/ evidence of movement disorder, uncertain about time frame of this.

## 2018-05-10 NOTE — ED PROVIDER NOTE - ATTENDING CONTRIBUTION TO CARE
I was physically present for the E/M service provided. I agree with above history, physical, and plan which I have reviewed and edited where appropriate. I was physically present for the key portions of the service provided.    89 M w/ Hx of CAD s/p PCI x2 on Plavix BIBEMS after fall vs syncope. Afebrile. A&Ox2. CN II-XII intact. Moves all extremities without lateralization. No mid-line c-spine TTP. Lungs CTA. Pelvis stable. Trauma imaging for fall on Plavix: CT Head, C-Spine, CXR, Pelvic XR. r/o cardiogenic syncope: EKG, electrolytes troponin, BNP.

## 2018-05-10 NOTE — ED PROVIDER NOTE - OBJECTIVE STATEMENT
Rey Osei MD (resident): 89 M w/ Hx of CAD s/p PCI x2 on plavix, who was BIBEMS from [facility] for fall. Pt was found on the floor by EMS. Unknown mechanism of the fall. Pt was found on his back. Pt reports that he was on the floor for several hours. Does not know if he had LOC. Pt reports neck pain and R hip. AFib, but no A/C reported.    PMD: Salvador

## 2018-05-10 NOTE — H&P ADULT - NSHPLABSRESULTS_GEN_ALL_CORE
13.2   7.5   )-----------( 165      ( 10 May 2018 04:41 )             40.1       05-10    142  |  105  |  27<H>  ----------------------------<  109<H>  4.9   |  23  |  1.39<H>    Ca    9.5      10 May 2018 04:41    TPro  7.2  /  Alb  4.1  /  TBili  0.4  /  DBili  x   /  AST  19  /  ALT  13  /  AlkPhos  75  05-10                  PT/INR - ( 10 May 2018 04:41 )   PT: 11.6 sec;   INR: 1.07 ratio         PTT - ( 10 May 2018 04:41 )  PTT:26.4 sec    Lactate Trend      CARDIAC MARKERS ( 10 May 2018 04:41 )  x     / <0.01 ng/mL / 102 U/L / x     / 2.9 ng/mL        CAPILLARY BLOOD GLUCOSE

## 2018-05-10 NOTE — H&P ADULT - HISTORY OF PRESENT ILLNESS
as per Pts daughter Patient went to the bathroom last night. Patient fell and couldent get up. Patient was found on the floor and

## 2018-05-10 NOTE — ED PROVIDER NOTE - PHYSICAL EXAMINATION
Physical Exam: elderly M who is in NAD, AAOx2, NCAT, MMM, neck is supple, no C-T-L spine TTP, PERRL, CTAB, normal rate and regular rhythm, abdomen is soft and NTND, + BLE pitting edema, No deformity of extremities, No rashes, tremors on examination, abnormal movement with rigidity of arms and legs. ~ Rey Osei MD

## 2018-05-10 NOTE — H&P ADULT - NSHPPHYSICALEXAM_GEN_ALL_CORE
General: WN/WD NAD  Neurology: A&O x 1, nonfocal, MAYA x 4  Eyes: PERRLA/ EOMI, Gross vision intact  ENT/Neck: Neck supple, trachea midline, No JVD, Gross hearing intact  Respiratory: CTA B/L, No wheezing, rales, rhonchi  CV: RRR, S1S2, no murmurs, rubs or gallops  Abdominal: Soft, NT, ND +BS,   Extremities: + LE edema + peripheral pulses  Skin: No Rashes, Hematoma, Ecchymosis

## 2018-05-10 NOTE — ED ADULT NURSE NOTE - OBJECTIVE STATEMENT
89 year old male presented to ED via EMS from [facility] with c/o of unwitnessed fall. Unknown LOC, pt has hx of dementia do not remember what happened, no physical trauma/injury, pt states he has a stiff neck, placed in [place] J. Pt denies CP, SOB, nausea/vomiting, numbness/tingling, fever, cough, chills, dizziness, headache. Pt alert to name and place, not time. heart rate regular, placed on cardiac monitor, lung sounds clear, abdomen soft nontender distended to palp. Skin intact. Pt currently resting in bed with RN and MD at bedside. Will continue to monitor and assess while offering support and reassurance. 89 year old male presented to ED via EMS from [facility] with c/o of unwitnessed fall. Unknown LOC, pt has hx of dementia do not remember what happened, no physical trauma/injury, pt states he has a stiff neck, placed in [place] J. Pt takes Plavix. Pt denies CP, SOB, nausea/vomiting, numbness/tingling, fever, cough, chills, dizziness, headache. Pt alert to name and place, not time. heart rate regular, placed on cardiac monitor, EKG completed handed to MD, lung sounds clear, abdomen soft nontender distended to palp. Skin intact. IV in left wrist 20G and patent. Pt currently resting in bed with RN and MD at bedside. Will continue to monitor and assess while offering support and reassurance.

## 2018-05-10 NOTE — H&P ADULT - ASSESSMENT
88 yo male presents with a hx of unwitnessed syncope. found on floor of bathroom and brought to [facility] for further eval

## 2018-05-10 NOTE — ED PROVIDER NOTE - PROGRESS NOTE DETAILS
Rey Osei MD (resident): call to Dr. Franco's service, awaiting call back Rey Osei MD (resident): call #2 Rey Osei MD (resident): call #3 to PMD Rey Osei MD (resident): Dr. Corley hospitalist to call Dr. Franco's service, prior to accepting the patient. Sybil PGY3: assumed patient on sign out. Will admit to Dr. Sanders on tele with concern for syncope.

## 2018-05-10 NOTE — ED ADULT NURSE REASSESSMENT NOTE - NS ED NURSE REASSESS COMMENT FT1
Neck collar noted on patient. Denies any pain/discomfort. Pt is in no current distress. Comfort and safety provided. Will continue to monitor.

## 2018-05-10 NOTE — ED PROVIDER NOTE - CARE PLAN
Principal Discharge DX:	Fall  Secondary Diagnosis:	Neck pain Principal Discharge DX:	Syncope  Secondary Diagnosis:	Neck pain

## 2018-05-11 ENCOUNTER — TRANSCRIPTION ENCOUNTER (OUTPATIENT)
Age: 83
End: 2018-05-11

## 2018-05-11 DIAGNOSIS — E78.00 PURE HYPERCHOLESTEROLEMIA, UNSPECIFIED: ICD-10-CM

## 2018-05-11 LAB
ANION GAP SERPL CALC-SCNC: 14 MMOL/L — SIGNIFICANT CHANGE UP (ref 5–17)
APPEARANCE UR: CLEAR — SIGNIFICANT CHANGE UP
BACTERIA # UR AUTO: ABNORMAL /HPF
BILIRUB UR-MCNC: NEGATIVE — SIGNIFICANT CHANGE UP
BUN SERPL-MCNC: 21 MG/DL — SIGNIFICANT CHANGE UP (ref 7–23)
CALCIUM SERPL-MCNC: 9.7 MG/DL — SIGNIFICANT CHANGE UP (ref 8.4–10.5)
CHLORIDE SERPL-SCNC: 102 MMOL/L — SIGNIFICANT CHANGE UP (ref 96–108)
CO2 SERPL-SCNC: 23 MMOL/L — SIGNIFICANT CHANGE UP (ref 22–31)
COLOR SPEC: SIGNIFICANT CHANGE UP
CREAT SERPL-MCNC: 1.31 MG/DL — HIGH (ref 0.5–1.3)
DIFF PNL FLD: ABNORMAL
EPI CELLS # UR: SIGNIFICANT CHANGE UP /HPF
GLUCOSE SERPL-MCNC: 92 MG/DL — SIGNIFICANT CHANGE UP (ref 70–99)
GLUCOSE UR QL: NEGATIVE — SIGNIFICANT CHANGE UP
HCT VFR BLD CALC: 40.3 % — SIGNIFICANT CHANGE UP (ref 39–50)
HGB BLD-MCNC: 13.6 G/DL — SIGNIFICANT CHANGE UP (ref 13–17)
KETONES UR-MCNC: ABNORMAL
LEUKOCYTE ESTERASE UR-ACNC: NEGATIVE — SIGNIFICANT CHANGE UP
MCHC RBC-ENTMCNC: 33.2 PG — SIGNIFICANT CHANGE UP (ref 27–34)
MCHC RBC-ENTMCNC: 33.6 GM/DL — SIGNIFICANT CHANGE UP (ref 32–36)
MCV RBC AUTO: 98.8 FL — SIGNIFICANT CHANGE UP (ref 80–100)
NITRITE UR-MCNC: NEGATIVE — SIGNIFICANT CHANGE UP
PH UR: 7 — SIGNIFICANT CHANGE UP (ref 5–8)
PLATELET # BLD AUTO: 171 K/UL — SIGNIFICANT CHANGE UP (ref 150–400)
POTASSIUM SERPL-MCNC: 4.3 MMOL/L — SIGNIFICANT CHANGE UP (ref 3.5–5.3)
POTASSIUM SERPL-SCNC: 4.3 MMOL/L — SIGNIFICANT CHANGE UP (ref 3.5–5.3)
PROT UR-MCNC: SIGNIFICANT CHANGE UP
RBC # BLD: 4.09 M/UL — LOW (ref 4.2–5.8)
RBC # FLD: 12.6 % — SIGNIFICANT CHANGE UP (ref 10.3–14.5)
RBC CASTS # UR COMP ASSIST: ABNORMAL /HPF (ref 0–2)
SODIUM SERPL-SCNC: 139 MMOL/L — SIGNIFICANT CHANGE UP (ref 135–145)
SP GR SPEC: 1.01 — SIGNIFICANT CHANGE UP (ref 1.01–1.02)
UROBILINOGEN FLD QL: NEGATIVE — SIGNIFICANT CHANGE UP
WBC # BLD: 7.5 K/UL — SIGNIFICANT CHANGE UP (ref 3.8–10.5)
WBC # FLD AUTO: 7.5 K/UL — SIGNIFICANT CHANGE UP (ref 3.8–10.5)
WBC UR QL: SIGNIFICANT CHANGE UP /HPF (ref 0–5)

## 2018-05-11 RX ORDER — FUROSEMIDE 40 MG
1 TABLET ORAL
Qty: 30 | Refills: 0 | OUTPATIENT
Start: 2018-05-11 | End: 2018-06-09

## 2018-05-11 RX ADMIN — RIVASTIGMINE 1 PATCH: 4.6 PATCH, EXTENDED RELEASE TRANSDERMAL at 21:07

## 2018-05-11 RX ADMIN — Medication 2 GRAM(S): at 17:22

## 2018-05-11 RX ADMIN — HEPARIN SODIUM 5000 UNIT(S): 5000 INJECTION INTRAVENOUS; SUBCUTANEOUS at 17:22

## 2018-05-11 RX ADMIN — Medication 25 MILLIGRAM(S): at 05:23

## 2018-05-11 RX ADMIN — Medication 40 MILLIGRAM(S): at 09:12

## 2018-05-11 RX ADMIN — Medication 2 GRAM(S): at 05:24

## 2018-05-11 RX ADMIN — ATORVASTATIN CALCIUM 10 MILLIGRAM(S): 80 TABLET, FILM COATED ORAL at 21:07

## 2018-05-11 RX ADMIN — MIRABEGRON 50 MILLIGRAM(S): 50 TABLET, EXTENDED RELEASE ORAL at 09:13

## 2018-05-11 RX ADMIN — CLOPIDOGREL BISULFATE 75 MILLIGRAM(S): 75 TABLET, FILM COATED ORAL at 09:12

## 2018-05-11 RX ADMIN — HEPARIN SODIUM 5000 UNIT(S): 5000 INJECTION INTRAVENOUS; SUBCUTANEOUS at 05:23

## 2018-05-11 NOTE — DISCHARGE NOTE ADULT - MEDICATION SUMMARY - MEDICATIONS TO TAKE
I will START or STAY ON the medications listed below when I get home from the hospital:    atorvastatin 10 mg oral tablet  -- 1 tab(s) by mouth once a day  -- Indication: For Hypercholesteremia    clopidogrel 75 mg oral tablet  -- 1 tab(s) by mouth once a day  -- Indication: For Cardiac     metoprolol extended release 25 mg oral tablet, extended release  -- 1 tab(s) by mouth once a day  -- Indication: For HTN (hypertension)    Exelon 9.5 mg/24 hr transdermal film, extended release  -- 1 patch by transdermal patch once a day  -- Indication: For Dementia    furosemide 40 mg oral tablet  -- 1 tab(s) by mouth once a day  -- Indication: For Heart failure    Saw Palmetto  -- 450 milligram(s) by mouth 2 times a day  -- Indication: For Suppliment    multivitamin with iron  -- 1 tab(s) by mouth once a day  -- Indication: For Suppliment    Fish Oil 1000 mg oral capsule  -- 1 cap(s) by mouth 2 times a day  -- Indication: For Suppliment    Myrbetriq 50 mg oral tablet, extended release  -- 1 tab(s) by mouth once a day  -- Indication: For Urinary control    Calcium 600+D 600 mg-200 units oral tablet  -- 1 tab(s) by mouth once a day  -- Indication: For Suppliment I will START or STAY ON the medications listed below when I get home from the hospital:    atorvastatin 10 mg oral tablet  -- 1 tab(s) by mouth once a day  -- Indication: For Hypercholesteremia    clopidogrel 75 mg oral tablet  -- 1 tab(s) by mouth once a day  -- Indication: For Cardiac     metoprolol extended release 25 mg oral tablet, extended release  -- 1 tab(s) by mouth once a day  -- Indication: For HTN (hypertension)    Exelon 9.5 mg/24 hr transdermal film, extended release  -- 1 patch by transdermal patch once a day  -- Indication: For Dementia    Saw Palmetto  -- 450 milligram(s) by mouth 2 times a day  -- Indication: For Suppliment    multivitamin with iron  -- 1 tab(s) by mouth once a day  -- Indication: For Suppliment    Fish Oil 1000 mg oral capsule  -- 1 cap(s) by mouth 2 times a day  -- Indication: For Suppliment    Myrbetriq 50 mg oral tablet, extended release  -- 1 tab(s) by mouth once a day  -- Indication: For Urinary control    Calcium 600+D 600 mg-200 units oral tablet  -- 1 tab(s) by mouth once a day  -- Indication: For Suppliment    Vitamin D3 2000 intl units oral capsule  -- 1 cap(s) by mouth once a day  -- Indication: For Supplement I will START or STAY ON the medications listed below when I get home from the hospital:    atorvastatin 10 mg oral tablet  -- 1 tab(s) by mouth once a day  -- Indication: For Hypercholesteremia    clopidogrel 75 mg oral tablet  -- 1 tab(s) by mouth once a day  -- Indication: For Cardiac     metoprolol extended release 25 mg oral tablet, extended release  -- 1 tab(s) by mouth once a day  -- Indication: For HTN (hypertension)    Exelon 9.5 mg/24 hr transdermal film, extended release  -- 1 patch by transdermal patch once a day  -- Indication: For Dementia    Saw Palmetto  -- 450 milligram(s) by mouth 2 times a day  -- Indication: For Supplement    multivitamin with iron  -- 1 tab(s) by mouth once a day  -- Indication: For Supplement    Fish Oil 1000 mg oral capsule  -- 1 cap(s) by mouth 2 times a day  -- Indication: For Supplement    Myrbetriq 50 mg oral tablet, extended release  -- 1 tab(s) by mouth once a day  -- Indication: For Urinary control    Calcium 600+D 600 mg-200 units oral tablet  -- 1 tab(s) by mouth once a day  -- Indication: For Suppliment    Vitamin D3 2000 intl units oral capsule  -- 1 cap(s) by mouth once a day  -- Indication: For Supplement I will START or STAY ON the medications listed below when I get home from the hospital:    atorvastatin 10 mg oral tablet  -- 1 tab(s) by mouth once a day  -- Indication: For Hypercholesteremia    clopidogrel 75 mg oral tablet  -- 1 tab(s) by mouth once a day  -- Indication: For Cardiac     metoprolol extended release 25 mg oral tablet, extended release  -- 1 tab(s) by mouth once a day  -- Indication: For HTN (hypertension)    amLODIPine 2.5 mg oral tablet  -- 1 tab(s) by mouth once a day  -- Indication: For HTN (hypertension)    Exelon 9.5 mg/24 hr transdermal film, extended release  -- 1 patch by transdermal patch once a day  -- Indication: For Dementia    furosemide 40 mg oral tablet  -- 1 tab(s) by mouth every 48 hours   -- Indication: For HTN (hypertension)    Saw Palmetto  -- 450 milligram(s) by mouth 2 times a day  -- Indication: For Supplement    multivitamin with iron  -- 1 tab(s) by mouth once a day  -- Indication: For Supplement    Fish Oil 1000 mg oral capsule  -- 1 cap(s) by mouth 2 times a day  -- Indication: For Supplement    Myrbetriq 50 mg oral tablet, extended release  -- 1 tab(s) by mouth once a day  -- Indication: For Urinary control    Calcium 600+D 600 mg-200 units oral tablet  -- 1 tab(s) by mouth once a day  -- Indication: For Suppliment    Vitamin D3 2000 intl units oral capsule  -- 1 cap(s) by mouth once a day  -- Indication: For Supplement

## 2018-05-11 NOTE — DISCHARGE NOTE ADULT - CARE PLAN
Principal Discharge DX:	Syncope  Goal:	Resolution of symptoms  Assessment and plan of treatment:	Use caution when changing positions and utilize the assistance of your home health aide to prevent falls  Be sure to drink adequate water daily to prevent dehydration.  Take your medications as prescribed  Secondary Diagnosis:	HTN (hypertension)  Assessment and plan of treatment:	Take your medications as prescribed  Low salt diet  Activity as tolerated.  Take all medication as prescribed.  Follow up with your medical doctor for routine blood pressure monitoring at your next visit.  Notify your doctor if you have any of the following symptoms:   Dizziness, Lightheadedness, Blurry vision, Headache, Chest pain, Shortness of breath  Secondary Diagnosis:	Atrial fibrillation  Assessment and plan of treatment:	Call your doctor if you feel your heart racing or beating unusually, chest tightness or pain, lightheaded, faint, shortness of breath especially with exercise  It is important to take your heart medication as prescribed  Secondary Diagnosis:	Dementia  Assessment and plan of treatment:	Continue medications as prescribed  Utilize the assistance and supervision of your Home Health Aide to assist with activities of daily living and movement for safety Principal Discharge DX:	Syncope  Goal:	Resolution of symptoms  Assessment and plan of treatment:	Use caution when changing positions and utilize the assistance of your home health aide to prevent falls  Be sure to drink adequate water daily to prevent dehydration.  Take your medications as prescribed  Secondary Diagnosis:	HTN (hypertension)  Assessment and plan of treatment:	Take your medications as prescribed-Amlodipine was added to optimize your BP  Low salt diet  Activity as tolerated.  Take all medication as prescribed.  Follow up with your medical doctor for routine blood pressure monitoring at your next visit.  Notify your doctor if you have any of the following symptoms:   Dizziness, Lightheadedness, Blurry vision, Headache, Chest pain, Shortness of breath  Secondary Diagnosis:	Atrial fibrillation  Assessment and plan of treatment:	Call your doctor if you feel your heart racing or beating unusually, chest tightness or pain, lightheaded, faint, shortness of breath especially with exercise  It is important to take your heart medication as prescribed  Secondary Diagnosis:	Dementia  Assessment and plan of treatment:	Continue medications as prescribed  Utilize the assistance and supervision of your Home Health Aide to assist with activities of daily living and movement for safety Principal Discharge DX:	Syncope  Goal:	Resolution of symptoms  Assessment and plan of treatment:	Fainting usually is caused by fear, stress, pain, standing too long, excessive tiredness, elevated temperature, using the bathroom, coughing, or low blood pressure.  Blood pressure can drop if you do not drink enough, are on blood pressure medications, drink alcohol, or experience bleeding.   Avoid activity or condition that causes your syncope.  Lay down with your feet up if you feel like you might faint; breath deeply until symptoms pass.  Consult with your doctor about driving, playing sports, or operating machinery.  If you pass out, call 911 to be taken to the nearest emergency room.  Also call 911 to be taken to the nearest emergency room if you experience dizziness or lightheadedness associated with  severe headache, slurred speech, vision changes, facial asymmetry, chest pain, abdominal pain, or back pain.  Secondary Diagnosis:	HTN (hypertension)  Assessment and plan of treatment:	Continue to follow a low salt/sodium diet.  Perform physical activities as tolerated in consultation with your Primary Care Provider and physical therapist.  Take all medications as prescribed.  Follow up with your medical doctor for routine blood pressure monitoring at your next visit.  Notify your doctor if you have any of the following symptoms:  Dizziness, lightheadedness, blurry vision, headache, chest pain, or shortness of breath.  Secondary Diagnosis:	Atrial fibrillation  Assessment and plan of treatment:	Atrial fibrillation is a common heart rhythm problem which increases the risk of stroke and heat attack.  It helps if you control your blood pressure, avoid alcohol, cut down on caffeine, get treatment for your thyroid if it is overactive, and perform moderate exercise in consultation with your Primary Care Provider.  Call your doctor if you experience chest tightness/pain, lightheadedness, loss of consciousness, shortness of breath (especially with exercise), feel your heart racing or beating unusually, frequent or abnormal bleeding.  It is important to take all your heart medications as prescribed.  Secondary Diagnosis:	Dementia  Assessment and plan of treatment:	Continue medications as prescribed.  Utilize the assistance and supervision of your Home Health Aide to assist with activities of daily living and movement for safety.

## 2018-05-11 NOTE — DISCHARGE NOTE ADULT - ADDITIONAL INSTRUCTIONS
Follow up with your Primary MD within 1 week. Call office for appointment Follow up with your Primary MD within 1 week - keep currently scheduled appointment. Follow up with your Primary MD within 1-2 weeks - keep currently scheduled appointment. Follow up with your Primary MD Dr Sanders when discharged from rehab. Call office for appointment.

## 2018-05-11 NOTE — DISCHARGE NOTE ADULT - HOME CARE AGENCY
[facility] [place] - [phone] - Registered Nurse will contact you to arrange initial visit.  Physical therapy to follow.

## 2018-05-11 NOTE — DISCHARGE NOTE ADULT - HOSPITAL COURSE
89 yo male presents with a hx of unwitnessed syncope. Found on floor of bathroom and brought to [facility] for further eval by patients family. Pt reportedly lives at home alone with HHA during daytime only.   Discharge with 24 hr HHA for safety. PT recommends inpatient [facility] but family prefers home PT services. 89 yo male presents with a hx of unwitnessed syncope.  Found on floor of bathroom and brought to [facility] for further eval by patients family.  Pt reportedly lives at home alone with HHA during daytime only.  PT recommends inpatient [facility] but family prefers home PT services.  Patient being discharged home with assisted living with his private aides as per his and the family's wishes.  Patient stable for discharge - to follow up with his Primary Care Doctor within 1-2 weeks. 89 yo male with Hx A Fib (not on A/C), Dementia, HTN, HLD with an unwitnessed fall/syncope.  Found on floor of bathroom at home and brought to [facility] for further eval by patients family.  A Fib: rate stable; Dementia: back to baseline of intermittent confusion; HTN: B/P stable given 12 hours of iv fluids & started amlodipine 2.5 mg po qd.   Pt reportedly lives at home alone with HHA during daytime only.  PT recommends inpatient [facility].  Patient stable for discharge - to follow up with his Primary Care Doctor within 1-2 weeks.

## 2018-05-11 NOTE — DISCHARGE NOTE ADULT - PLAN OF CARE
Resolution of symptoms Use caution when changing positions and utilize the assistance of your home health aide to prevent falls  Be sure to drink adequate water daily to prevent dehydration.  Take your medications as prescribed Take your medications as prescribed  Low salt diet  Activity as tolerated.  Take all medication as prescribed.  Follow up with your medical doctor for routine blood pressure monitoring at your next visit.  Notify your doctor if you have any of the following symptoms:   Dizziness, Lightheadedness, Blurry vision, Headache, Chest pain, Shortness of breath Call your doctor if you feel your heart racing or beating unusually, chest tightness or pain, lightheaded, faint, shortness of breath especially with exercise  It is important to take your heart medication as prescribed Continue medications as prescribed  Utilize the assistance and supervision of your Home Health Aide to assist with activities of daily living and movement for safety Take your medications as prescribed-Amlodipine was added to optimize your BP  Low salt diet  Activity as tolerated.  Take all medication as prescribed.  Follow up with your medical doctor for routine blood pressure monitoring at your next visit.  Notify your doctor if you have any of the following symptoms:   Dizziness, Lightheadedness, Blurry vision, Headache, Chest pain, Shortness of breath Continue to follow a low salt/sodium diet.  Perform physical activities as tolerated in consultation with your Primary Care Provider and physical therapist.  Take all medications as prescribed.  Follow up with your medical doctor for routine blood pressure monitoring at your next visit.  Notify your doctor if you have any of the following symptoms:  Dizziness, lightheadedness, blurry vision, headache, chest pain, or shortness of breath. Atrial fibrillation is a common heart rhythm problem which increases the risk of stroke and heat attack.  It helps if you control your blood pressure, avoid alcohol, cut down on caffeine, get treatment for your thyroid if it is overactive, and perform moderate exercise in consultation with your Primary Care Provider.  Call your doctor if you experience chest tightness/pain, lightheadedness, loss of consciousness, shortness of breath (especially with exercise), feel your heart racing or beating unusually, frequent or abnormal bleeding.  It is important to take all your heart medications as prescribed. Continue medications as prescribed.  Utilize the assistance and supervision of your Home Health Aide to assist with activities of daily living and movement for safety. Fainting usually is caused by fear, stress, pain, standing too long, excessive tiredness, elevated temperature, using the bathroom, coughing, or low blood pressure.  Blood pressure can drop if you do not drink enough, are on blood pressure medications, drink alcohol, or experience bleeding.   Avoid activity or condition that causes your syncope.  Lay down with your feet up if you feel like you might faint; breath deeply until symptoms pass.  Consult with your doctor about driving, playing sports, or operating machinery.  If you pass out, call 911 to be taken to the nearest emergency room.  Also call 911 to be taken to the nearest emergency room if you experience dizziness or lightheadedness associated with  severe headache, slurred speech, vision changes, facial asymmetry, chest pain, abdominal pain, or back pain.

## 2018-05-11 NOTE — DISCHARGE NOTE ADULT - CARE PROVIDER_API CALL
Jeremy Sanders (DO), Medicine  [address] [place] [id number]  Phone: [phone]  Fax: [phone]

## 2018-05-11 NOTE — DISCHARGE NOTE ADULT - PATIENT PORTAL LINK FT
You can access the [facility] Patient Portal, offered by [facility], by registering with the following website: [email or web address]

## 2018-05-11 NOTE — DISCHARGE NOTE ADULT - CONDITIONS AT DISCHARGE
stable Patient PIVL removed. no  evidence of infiltration noted at discharge. Patient skin intact, vital signs stable, Patient with no complaints of SOB, or chest pain at discharge. Pt discharged to rehab as per MD orders in stable condition.

## 2018-05-12 LAB
ANION GAP SERPL CALC-SCNC: 13 MMOL/L — SIGNIFICANT CHANGE UP (ref 5–17)
BUN SERPL-MCNC: 26 MG/DL — HIGH (ref 7–23)
CALCIUM SERPL-MCNC: 9.6 MG/DL — SIGNIFICANT CHANGE UP (ref 8.4–10.5)
CHLORIDE SERPL-SCNC: 101 MMOL/L — SIGNIFICANT CHANGE UP (ref 96–108)
CO2 SERPL-SCNC: 25 MMOL/L — SIGNIFICANT CHANGE UP (ref 22–31)
CREAT SERPL-MCNC: 1.51 MG/DL — HIGH (ref 0.5–1.3)
GLUCOSE SERPL-MCNC: 104 MG/DL — HIGH (ref 70–99)
POTASSIUM SERPL-MCNC: 4 MMOL/L — SIGNIFICANT CHANGE UP (ref 3.5–5.3)
POTASSIUM SERPL-SCNC: 4 MMOL/L — SIGNIFICANT CHANGE UP (ref 3.5–5.3)
SODIUM SERPL-SCNC: 139 MMOL/L — SIGNIFICANT CHANGE UP (ref 135–145)

## 2018-05-12 RX ORDER — DEXTROSE MONOHYDRATE, SODIUM CHLORIDE, AND POTASSIUM CHLORIDE 50; .745; 4.5 G/1000ML; G/1000ML; G/1000ML
1000 INJECTION, SOLUTION INTRAVENOUS
Qty: 0 | Refills: 0 | Status: DISCONTINUED | OUTPATIENT
Start: 2018-05-12 | End: 2018-05-13

## 2018-05-12 RX ADMIN — ATORVASTATIN CALCIUM 10 MILLIGRAM(S): 80 TABLET, FILM COATED ORAL at 21:04

## 2018-05-12 RX ADMIN — Medication 2 GRAM(S): at 05:08

## 2018-05-12 RX ADMIN — RIVASTIGMINE 1 PATCH: 4.6 PATCH, EXTENDED RELEASE TRANSDERMAL at 21:04

## 2018-05-12 RX ADMIN — Medication 25 MILLIGRAM(S): at 05:08

## 2018-05-12 RX ADMIN — DEXTROSE MONOHYDRATE, SODIUM CHLORIDE, AND POTASSIUM CHLORIDE 75 MILLILITER(S): 50; .745; 4.5 INJECTION, SOLUTION INTRAVENOUS at 22:33

## 2018-05-12 RX ADMIN — Medication 40 MILLIGRAM(S): at 05:08

## 2018-05-12 RX ADMIN — HEPARIN SODIUM 5000 UNIT(S): 5000 INJECTION INTRAVENOUS; SUBCUTANEOUS at 17:49

## 2018-05-12 RX ADMIN — HEPARIN SODIUM 5000 UNIT(S): 5000 INJECTION INTRAVENOUS; SUBCUTANEOUS at 05:08

## 2018-05-12 RX ADMIN — Medication 2 GRAM(S): at 17:49

## 2018-05-12 RX ADMIN — MIRABEGRON 50 MILLIGRAM(S): 50 TABLET, EXTENDED RELEASE ORAL at 12:21

## 2018-05-12 RX ADMIN — CLOPIDOGREL BISULFATE 75 MILLIGRAM(S): 75 TABLET, FILM COATED ORAL at 12:21

## 2018-05-12 NOTE — PROVIDER CONTACT NOTE (OTHER) - RECOMMENDATIONS
order IV fluids for hydration, will continue to monitor order IV fluids for hydration?, will continue to monitor

## 2018-05-12 NOTE — PROVIDER CONTACT NOTE (OTHER) - ASSESSMENT
pt is a+ox1, pt asymptomatic, pt denies dizziness sob, n and v. pt sitting in chair at this time. manual bp 90/58

## 2018-05-13 LAB
ANION GAP SERPL CALC-SCNC: 13 MMOL/L — SIGNIFICANT CHANGE UP (ref 5–17)
BUN SERPL-MCNC: 27 MG/DL — HIGH (ref 7–23)
CALCIUM SERPL-MCNC: 8.9 MG/DL — SIGNIFICANT CHANGE UP (ref 8.4–10.5)
CHLORIDE SERPL-SCNC: 99 MMOL/L — SIGNIFICANT CHANGE UP (ref 96–108)
CO2 SERPL-SCNC: 26 MMOL/L — SIGNIFICANT CHANGE UP (ref 22–31)
CREAT SERPL-MCNC: 1.44 MG/DL — HIGH (ref 0.5–1.3)
GLUCOSE SERPL-MCNC: 114 MG/DL — HIGH (ref 70–99)
HCT VFR BLD CALC: 40.7 % — SIGNIFICANT CHANGE UP (ref 39–50)
HGB BLD-MCNC: 14 G/DL — SIGNIFICANT CHANGE UP (ref 13–17)
MCHC RBC-ENTMCNC: 33.8 PG — SIGNIFICANT CHANGE UP (ref 27–34)
MCHC RBC-ENTMCNC: 34.2 GM/DL — SIGNIFICANT CHANGE UP (ref 32–36)
MCV RBC AUTO: 98.8 FL — SIGNIFICANT CHANGE UP (ref 80–100)
PLATELET # BLD AUTO: 158 K/UL — SIGNIFICANT CHANGE UP (ref 150–400)
POTASSIUM SERPL-MCNC: 3.9 MMOL/L — SIGNIFICANT CHANGE UP (ref 3.5–5.3)
POTASSIUM SERPL-SCNC: 3.9 MMOL/L — SIGNIFICANT CHANGE UP (ref 3.5–5.3)
RBC # BLD: 4.12 M/UL — LOW (ref 4.2–5.8)
RBC # FLD: 12.6 % — SIGNIFICANT CHANGE UP (ref 10.3–14.5)
SODIUM SERPL-SCNC: 138 MMOL/L — SIGNIFICANT CHANGE UP (ref 135–145)
WBC # BLD: 7.8 K/UL — SIGNIFICANT CHANGE UP (ref 3.8–10.5)
WBC # FLD AUTO: 7.8 K/UL — SIGNIFICANT CHANGE UP (ref 3.8–10.5)

## 2018-05-13 RX ORDER — AMLODIPINE BESYLATE 2.5 MG/1
2.5 TABLET ORAL DAILY
Qty: 0 | Refills: 0 | Status: DISCONTINUED | OUTPATIENT
Start: 2018-05-13 | End: 2018-05-14

## 2018-05-13 RX ORDER — AMLODIPINE BESYLATE 2.5 MG/1
1 TABLET ORAL
Qty: 30 | Refills: 0
Start: 2018-05-13 | End: 2018-06-11

## 2018-05-13 RX ORDER — FUROSEMIDE 40 MG
40 TABLET ORAL
Qty: 0 | Refills: 0 | Status: DISCONTINUED | OUTPATIENT
Start: 2018-05-15 | End: 2018-05-14

## 2018-05-13 RX ORDER — FUROSEMIDE 40 MG
1 TABLET ORAL
Qty: 15 | Refills: 0
Start: 2018-05-13 | End: 2018-06-11

## 2018-05-13 RX ADMIN — Medication 25 MILLIGRAM(S): at 06:36

## 2018-05-13 RX ADMIN — HEPARIN SODIUM 5000 UNIT(S): 5000 INJECTION INTRAVENOUS; SUBCUTANEOUS at 17:34

## 2018-05-13 RX ADMIN — RIVASTIGMINE 1 PATCH: 4.6 PATCH, EXTENDED RELEASE TRANSDERMAL at 21:12

## 2018-05-13 RX ADMIN — Medication 2 GRAM(S): at 17:34

## 2018-05-13 RX ADMIN — Medication 40 MILLIGRAM(S): at 06:36

## 2018-05-13 RX ADMIN — CLOPIDOGREL BISULFATE 75 MILLIGRAM(S): 75 TABLET, FILM COATED ORAL at 13:55

## 2018-05-13 RX ADMIN — RIVASTIGMINE 1 PATCH: 4.6 PATCH, EXTENDED RELEASE TRANSDERMAL at 21:11

## 2018-05-13 RX ADMIN — ATORVASTATIN CALCIUM 10 MILLIGRAM(S): 80 TABLET, FILM COATED ORAL at 21:12

## 2018-05-13 RX ADMIN — MIRABEGRON 50 MILLIGRAM(S): 50 TABLET, EXTENDED RELEASE ORAL at 13:55

## 2018-05-13 RX ADMIN — HEPARIN SODIUM 5000 UNIT(S): 5000 INJECTION INTRAVENOUS; SUBCUTANEOUS at 06:36

## 2018-05-13 RX ADMIN — AMLODIPINE BESYLATE 2.5 MILLIGRAM(S): 2.5 TABLET ORAL at 13:54

## 2018-05-13 NOTE — PHYSICAL THERAPY INITIAL EVALUATION ADULT - TRANSFER SAFETY CONCERNS NOTED: SIT/STAND, REHAB EVAL
losing balance/decreased step length/decreased safety awareness/decreased sequencing ability/decreased weight-shifting ability/decreased balance during turns

## 2018-05-13 NOTE — PHYSICAL THERAPY INITIAL EVALUATION ADULT - IMPAIRMENTS FOUND, PT EVAL
gait, locomotion, and balance/muscle strength/poor safety awareness/arousal, attention, and cognition

## 2018-05-13 NOTE — PHYSICAL THERAPY INITIAL EVALUATION ADULT - LIVES WITH, PROFILE
pt poor historian at this time, as per CM's note, pt lives alone, apt, no steps to enter, HHA 8am-8pm

## 2018-05-13 NOTE — PROVIDER CONTACT NOTE (OTHER) - ACTION/TREATMENT ORDERED:
PA notified and aware.  Give AM dose of metoprolol and furosemide.  Retake BP in 1 hour.  Continue IVF and continue to monitor patient
placed pt from chair to Bed, NP notified and aware, will assess pt, continue to monitor

## 2018-05-13 NOTE — PHYSICAL THERAPY INITIAL EVALUATION ADULT - PERTINENT HX OF CURRENT PROBLEM, REHAB EVAL
89yoM, pmhx below. p/w s/p fall. as per Pts daughter pt went to the bathroom last night. pt fell and couldent get up. CTH 5/10 (-). xray chest/hip/pelvis (-).

## 2018-05-13 NOTE — PHYSICAL THERAPY INITIAL EVALUATION ADULT - STRENGTHENING, PT EVAL
GOAL: Pt would improve BUE/BLE strength to at least 4/5 in 4 wks, in order to complete all functional activities safely

## 2018-05-13 NOTE — PHYSICAL THERAPY INITIAL EVALUATION ADULT - GAIT DEVIATIONS NOTED, PT EVAL
decreased velocity of limb motion/decreased step length/decreased stride length/decreased nani/decreased weight-shifting ability

## 2018-05-13 NOTE — PHYSICAL THERAPY INITIAL EVALUATION ADULT - DISCHARGE DISPOSITION, PT EVAL
rehabilitation facility/subacute rehab. If DC home, pt would benefit from Home with PT for functional/safety assessment, gait/endurance training, general strengthening and fall risk prevention. pt would require assist with all ADLs and functional activities upon DC. Pt would benefit from wheelchair

## 2018-05-13 NOTE — PROVIDER CONTACT NOTE (OTHER) - ASSESSMENT
A&O x1.  Patient started on D5 + NS with 20 mEq KCl @ 75 ml/hr since 2230 on 5-12-18.  /84.  Patient asymptomatic and denies pain/discomfort.

## 2018-05-14 VITALS
SYSTOLIC BLOOD PRESSURE: 112 MMHG | TEMPERATURE: 98 F | OXYGEN SATURATION: 97 % | RESPIRATION RATE: 18 BRPM | DIASTOLIC BLOOD PRESSURE: 78 MMHG | HEART RATE: 56 BPM

## 2018-05-14 LAB
ANION GAP SERPL CALC-SCNC: 16 MMOL/L — SIGNIFICANT CHANGE UP (ref 5–17)
BUN SERPL-MCNC: 32 MG/DL — HIGH (ref 7–23)
CALCIUM SERPL-MCNC: 9.1 MG/DL — SIGNIFICANT CHANGE UP (ref 8.4–10.5)
CHLORIDE SERPL-SCNC: 98 MMOL/L — SIGNIFICANT CHANGE UP (ref 96–108)
CO2 SERPL-SCNC: 25 MMOL/L — SIGNIFICANT CHANGE UP (ref 22–31)
CREAT SERPL-MCNC: 1.51 MG/DL — HIGH (ref 0.5–1.3)
GLUCOSE SERPL-MCNC: 106 MG/DL — HIGH (ref 70–99)
HCT VFR BLD CALC: 42 % — SIGNIFICANT CHANGE UP (ref 39–50)
HGB BLD-MCNC: 14.2 G/DL — SIGNIFICANT CHANGE UP (ref 13–17)
MCHC RBC-ENTMCNC: 33.4 PG — SIGNIFICANT CHANGE UP (ref 27–34)
MCHC RBC-ENTMCNC: 33.9 GM/DL — SIGNIFICANT CHANGE UP (ref 32–36)
MCV RBC AUTO: 98.4 FL — SIGNIFICANT CHANGE UP (ref 80–100)
PLATELET # BLD AUTO: 180 K/UL — SIGNIFICANT CHANGE UP (ref 150–400)
POTASSIUM SERPL-MCNC: 3.7 MMOL/L — SIGNIFICANT CHANGE UP (ref 3.5–5.3)
POTASSIUM SERPL-SCNC: 3.7 MMOL/L — SIGNIFICANT CHANGE UP (ref 3.5–5.3)
RBC # BLD: 4.27 M/UL — SIGNIFICANT CHANGE UP (ref 4.2–5.8)
RBC # FLD: 12.7 % — SIGNIFICANT CHANGE UP (ref 10.3–14.5)
SODIUM SERPL-SCNC: 139 MMOL/L — SIGNIFICANT CHANGE UP (ref 135–145)
WBC # BLD: 7.4 K/UL — SIGNIFICANT CHANGE UP (ref 3.8–10.5)
WBC # FLD AUTO: 7.4 K/UL — SIGNIFICANT CHANGE UP (ref 3.8–10.5)

## 2018-05-14 PROCEDURE — 97163 PT EVAL HIGH COMPLEX 45 MIN: CPT

## 2018-05-14 PROCEDURE — 93005 ELECTROCARDIOGRAM TRACING: CPT

## 2018-05-14 PROCEDURE — 82550 ASSAY OF CK (CPK): CPT

## 2018-05-14 PROCEDURE — 85730 THROMBOPLASTIN TIME PARTIAL: CPT

## 2018-05-14 PROCEDURE — 85610 PROTHROMBIN TIME: CPT

## 2018-05-14 PROCEDURE — 72170 X-RAY EXAM OF PELVIS: CPT

## 2018-05-14 PROCEDURE — 96374 THER/PROPH/DIAG INJ IV PUSH: CPT

## 2018-05-14 PROCEDURE — 82553 CREATINE MB FRACTION: CPT

## 2018-05-14 PROCEDURE — 70450 CT HEAD/BRAIN W/O DYE: CPT

## 2018-05-14 PROCEDURE — 83880 ASSAY OF NATRIURETIC PEPTIDE: CPT

## 2018-05-14 PROCEDURE — 84484 ASSAY OF TROPONIN QUANT: CPT

## 2018-05-14 PROCEDURE — 81001 URINALYSIS AUTO W/SCOPE: CPT

## 2018-05-14 PROCEDURE — 83690 ASSAY OF LIPASE: CPT

## 2018-05-14 PROCEDURE — 80048 BASIC METABOLIC PNL TOTAL CA: CPT

## 2018-05-14 PROCEDURE — 72125 CT NECK SPINE W/O DYE: CPT

## 2018-05-14 PROCEDURE — 80307 DRUG TEST PRSMV CHEM ANLYZR: CPT

## 2018-05-14 PROCEDURE — 73502 X-RAY EXAM HIP UNI 2-3 VIEWS: CPT

## 2018-05-14 PROCEDURE — 80053 COMPREHEN METABOLIC PANEL: CPT

## 2018-05-14 PROCEDURE — 85027 COMPLETE CBC AUTOMATED: CPT

## 2018-05-14 PROCEDURE — 99285 EMERGENCY DEPT VISIT HI MDM: CPT | Mod: 25

## 2018-05-14 PROCEDURE — 71045 X-RAY EXAM CHEST 1 VIEW: CPT

## 2018-05-14 RX ADMIN — Medication 25 MILLIGRAM(S): at 05:46

## 2018-05-14 RX ADMIN — MIRABEGRON 50 MILLIGRAM(S): 50 TABLET, EXTENDED RELEASE ORAL at 13:36

## 2018-05-14 RX ADMIN — AMLODIPINE BESYLATE 2.5 MILLIGRAM(S): 2.5 TABLET ORAL at 05:46

## 2018-05-14 RX ADMIN — HEPARIN SODIUM 5000 UNIT(S): 5000 INJECTION INTRAVENOUS; SUBCUTANEOUS at 05:46

## 2018-05-14 RX ADMIN — CLOPIDOGREL BISULFATE 75 MILLIGRAM(S): 75 TABLET, FILM COATED ORAL at 13:36

## 2018-05-14 RX ADMIN — HEPARIN SODIUM 5000 UNIT(S): 5000 INJECTION INTRAVENOUS; SUBCUTANEOUS at 17:51

## 2018-05-14 NOTE — PROGRESS NOTE ADULT - PROBLEM SELECTOR PLAN 5
Had full work up recently ROBERT will monitor and start PT in hope of getting him home to his assisted living with his private aides.  Family does not wish him to go to rehab.
no new syncopal episodes
Had full work up recently ROBERT will monitor and start PT in hope of getting him home to his assisted living with his private aides.  Family does not wish him to go to rehab.
Had full work up recently ROBERT will monitor and start PT in hope of getting him home to his assisted living with his private aides.  Family does not wish him to go to rehab.

## 2018-05-14 NOTE — PROGRESS NOTE ADULT - ASSESSMENT
Patient found on floor by aide.  Syncope.  NO evidence of infections.  No significant tachy or manasa arrhythmias. Continue to monitor. Ambulated patient and when significantly more active he can be d/c home to assisted living with his aides as per his  and the family's wishes. Patient unable to go home due to instability with ambulation. NCC working on rehab placement
Patient found on floor by aide.  Syncope.  NO evidence of infections.  No significant tachy or manasa arrhythmias. Continue to monitor. Ambulated patient and when significantly more active he can be d/c home to assisted living with his aides as per his  and the family's wishes.
Patient found on floor by aide.  Syncope.  NO evidence of infections.  No significant tachy or manasa arrhythmias. Continue to monitor. Ambulated patient and when significantly more active he can be d/c home to assisted living with his aides as per his  and the family's wishes.
Patient found on floor by aide.  Syncope.  NO evidence of infections.  No significant tachy or manasa arrhythmias. Continue to monitor. Ambulated patient and when significantly more active he can be d/c home to assisted living with his aides as per his  and the family's wishes.  Discussed with son today - will d/c with private aide to his assisted living and follow up in the office in 14 days.  Laisx will be given every other day and  amlodipine will be started at 2.5 mg  daily.

## 2018-05-14 NOTE — PROGRESS NOTE ADULT - PROBLEM SELECTOR PLAN 2
Improved - back to baseline of intermittent confusion
Improved - back to baseline
Unchanged not worse
Improved - back to baseline of intermittent confusion

## 2018-05-14 NOTE — PROGRESS NOTE ADULT - PROBLEM SELECTOR PLAN 3
Blood pressure stable  given 12 hours of iv fluids  start amlodipine 2.5 mg po qd
Blood pressure stable
Blood pressure stable  give 12 hours of iv fluids
Blood pressure stable  given 12 hours of iv fluids  start amlodipine 2.5 mg po qd

## 2018-05-14 NOTE — PROGRESS NOTE ADULT - PROBLEM SELECTOR PLAN 4
Low cholesterol diet and exercise

## 2018-05-14 NOTE — PROGRESS NOTE ADULT - SUBJECTIVE AND OBJECTIVE BOX
Patient is a 89y old  Male who presents with a chief complaint of syncope Patent a little paranoid  Son says that he goes in and out of this.  Physically he is back to baseline and has been walking with the aides.      MEDICATIONS  (STANDING):  amLODIPine   Tablet 2.5 milliGRAM(s) Oral daily  atorvastatin 10 milliGRAM(s) Oral at bedtime  clopidogrel Tablet 75 milliGRAM(s) Oral daily  heparin  Injectable 5000 Unit(s) SubCutaneous every 12 hours  metoprolol succinate ER 25 milliGRAM(s) Oral daily  mirabegron ER 50 milliGRAM(s) Oral daily  omega-3-Acid Ethyl Esters 2 Gram(s) Oral two times a day  rivastigmine patch  9.5 mG/24 Hr(s) 1 Patch Transdermal every 24 hours    MEDICATIONS  (PRN):          VITALS:   T(C): 36.7 (05-14-18 @ 04:51), Max: 36.7 (05-14-18 @ 04:51)  HR: 80 (05-14-18 @ 04:51) (76 - 81)  BP: 158/81 (05-14-18 @ 04:51) (158/81 - 168/92)  RR: 18 (05-14-18 @ 04:51) (18 - 19)  SpO2: 95% (05-14-18 @ 04:51) (94% - 96%)  Wt(kg): --      PHYSICAL EXAM:  GENERAL: NAD, well nourished and conversant  HEAD:  Atraumatic  EYES: EOM, PERRLA, conjunctiva pink and sclera white  ENT: No tonsillar erythema, exudates, or enlargement, moist mucous membranes, good dentition, no lesions  NECK: Supple, No JVD, normal thyroid, carotids with normal upstrokes and no bruits  CHEST/LUNG: Clear to auscultation bilaterally, No rales, rhonchi, wheezing, or rubs  HEART: Regular rate and rhythm, No murmurs, rubs, or gallops  ABDOMEN: Soft, nondistended, no masses, guarding, tenderness or rebound, bowel sounds present  EXTREMITIES:  2+ Peripheral Pulses, No clubbing, cyanosis, or edema. No arthritis of shoulders, elbows, hands, hips, knees, ankles, or feet. No DJD C spine, T spine, or L/S spine  LYMPH: No lymphadenopathy noted  SKIN: No rashes or lesions  NERVOUS SYSTEM Motor Strength 5/5 right upper and right lower.  5/5 left upper and left lower extremities, DTRs 2+ intact and symmetric  LABS:        CBC Full  -  ( 14 May 2018 06:27 )  WBC Count : 7.4 K/uL  Hemoglobin : 14.2 g/dL  Hematocrit : 42.0 %  Platelet Count - Automated : 180 K/uL  Mean Cell Volume : 98.4 fl  Mean Cell Hemoglobin : 33.4 pg  Mean Cell Hemoglobin Concentration : 33.9 gm/dL  Auto Neutrophil # : x  Auto Lymphocyte # : x  Auto Monocyte # : x  Auto Eosinophil # : x  Auto Basophil # : x  Auto Neutrophil % : x  Auto Lymphocyte % : x  Auto Monocyte % : x  Auto Eosinophil % : x  Auto Basophil % : x    05-14    139  |  98  |  32<H>  ----------------------------<  106<H>  3.7   |  25  |  1.51<H>    Ca    9.1      14 May 2018 06:27
Patient is a 89y old  Male who presents with a chief complaint of syncope (10 May 2018 18:01)      HPI:  88 yo male found on floor by aide NO evidence of infection No cns bleed of stroke,  Still a little lethargic but more active and interacting with physician.  Needs to have increased ambulation.    MEDICATIONS  (STANDING):  atorvastatin 10 milliGRAM(s) Oral at bedtime  clopidogrel Tablet 75 milliGRAM(s) Oral daily  furosemide    Tablet 40 milliGRAM(s) Oral daily  heparin  Injectable 5000 Unit(s) SubCutaneous every 12 hours  metoprolol succinate ER 25 milliGRAM(s) Oral daily  mirabegron ER 50 milliGRAM(s) Oral daily  omega-3-Acid Ethyl Esters 2 Gram(s) Oral two times a day  rivastigmine patch  9.5 mG/24 Hr(s) 1 Patch Transdermal every 24 hours    MEDICATIONS  (PRN):      Allergies    No Known Allergies    Intolerances    morphine (Unknown)        VITALS:   T(C): 37 (05-11-18 @ 13:54), Max: 37 (05-11-18 @ 13:54)  HR: 76 (05-11-18 @ 13:54) (76 - 82)  BP: 143/78 (05-11-18 @ 13:54) (143/78 - 179/92)  RR: 18 (05-11-18 @ 13:54) (16 - 18)  SpO2: 94% (05-11-18 @ 13:54) (94% - 97%)  Wt(kg): --    05-10 @ 07:01  -  05-11 @ 07:00  --------------------------------------------------------  IN: 240 mL / OUT: 0 mL / NET: 240 mL    05-11 @ 07:01  -  05-11 @ 15:55  --------------------------------------------------------  IN: 260 mL / OUT: 240 mL / NET: 20 mL        PHYSICAL EXAM:  GENERAL: chronically ill appearing slightly lethargic  and minimally conversant  HEAD:  Atraumatic  EYES: EOM, PERRLA, conjunctiva pink and sclera white  ENT: No tonsillar erythema, exudates, or enlargement, moist mucous membranes, good dentition, no lesions  NECK: Supple, No JVD, normal thyroid, carotids with normal upstrokes and no bruits  CHEST/LUNG: Clear to auscultation bilaterally, No rales, rhonchi, wheezing, or rubs  HEART: Regular rate and rhythm, No murmurs, rubs, or gallops  ABDOMEN: Soft, nondistended, no masses, guarding, tenderness or rebound, bowel sounds present  EXTREMITIES:  2+ Peripheral Pulses, No clubbing, cyanosis, or edema. No arthritis of shoulders, elbows, hands, hips, knees, ankles, or feet. No DJD C spine, T spine, or L/S spine  LYMPH: No lymphadenopathy noted  SKIN: No rashes or lesions  NERVOUS SYSTEM:  Alert & Oriented X3, normal cognitive function. Motor Strength 5/5 right upper and right lower.  5/5 left upper and left lower extremities, DTRs 2+ intact and symmetric    LABS:                          13.6   7.5   )-----------( 171      ( 11 May 2018 06:48 )             40.3     05-11    139  |  102  |  21  ----------------------------<  92  4.3   |  23  |  1.31<H>  05-10    142  |  105  |  27<H>  ----------------------------<  109<H>  4.9   |  23  |  1.39<H>    Ca    9.7      11 May 2018 06:46  Ca    9.5      10 May 2018 04:41    TPro  7.2  /  Alb  4.1  /  TBili  0.4  /  DBili  x   /  AST  19  /  ALT  13  /  AlkPhos  75  05-10    CAPILLARY BLOOD GLUCOSE          RADIOLOGY & ADDITIONAL TESTS:      Consultant(s):    Care Discussed with Consultants/Other Providers [x ] YES  [ ] NO
Patient is a 89y old  Male who presents with a chief complaint of syncope (11 May 2018 16:31)      HPI:  Patent much more alert and awake Will giive 12 hours of increased fluids and if he is ambulatory and as awake and alert as today will d/c to assisted living tomorrow with home health aide    MEDICATIONS  (STANDING):  atorvastatin 10 milliGRAM(s) Oral at bedtime  clopidogrel Tablet 75 milliGRAM(s) Oral daily  dextrose 5% + sodium chloride 0.9% with potassium chloride 20 mEq/L 1000 milliLiter(s) (75 mL/Hr) IV Continuous <Continuous>  furosemide    Tablet 40 milliGRAM(s) Oral daily  heparin  Injectable 5000 Unit(s) SubCutaneous every 12 hours  metoprolol succinate ER 25 milliGRAM(s) Oral daily  mirabegron ER 50 milliGRAM(s) Oral daily  omega-3-Acid Ethyl Esters 2 Gram(s) Oral two times a day  rivastigmine patch  9.5 mG/24 Hr(s) 1 Patch Transdermal every 24 hours    MEDICATIONS  (PRN):      Allergies    No Known Allergies    Intolerances    morphine (Unknown)          VITALS:   T(C): 36.7 (05-12-18 @ 21:02), Max: 36.7 (05-11-18 @ 22:05)  HR: 68 (05-12-18 @ 21:02) (68 - 85)  BP: 134/73 (05-12-18 @ 21:02) (90/58 - 168/80)  RR: 18 (05-12-18 @ 21:02) (18 - 18)  SpO2: 95% (05-12-18 @ 21:02) (95% - 98%)  Wt(kg): --    05-11 @ 07:01  -  05-12 @ 07:00  --------------------------------------------------------  IN: 560 mL / OUT: 240 mL / NET: 320 mL    05-12 @ 07:01  -  05-12 @ 21:22  --------------------------------------------------------  IN: 400 mL / OUT: 0 mL / NET: 400 mL        PHYSICAL EXAM:  GENERAL: NAD, well nourished and conversant  HEAD:  Atraumatic  EYES: EOM, PERRLA, conjunctiva pink and sclera white  ENT: No tonsillar erythema, exudates, or enlargement, moist mucous membranes, good dentition, no lesions  NECK: Supple, No JVD, normal thyroid, carotids with normal upstrokes and no bruits  CHEST/LUNG: Clear to auscultation bilaterally, No rales, rhonchi, wheezing, or rubs  HEART: Regular rate and rhythm, No murmurs, rubs, or gallops  ABDOMEN: Soft, nondistended, no masses, guarding, tenderness or rebound, bowel sounds present  EXTREMITIES:  2+ Peripheral Pulses, No clubbing, cyanosis, or edema. No arthritis of shoulders, elbows, hands, hips, knees, ankles, or feet. No DJD C spine, T spine, or L/S spine  LYMPH: No lymphadenopathy noted  SKIN: No rashes or lesions  NERVOUS SYSTEM:  Alert & Oriented X2, normal cognitive function. Motor Strength 5/5 right upper and right lower.  5/5 left upper and left lower extremities, DTRs 2+ intact and symmetric    LABS:                          13.6   7.5   )-----------( 171      ( 11 May 2018 06:48 )             40.3     05-12    139  |  101  |  26<H>  ----------------------------<  104<H>  4.0   |  25  |  1.51<H>  05-11    139  |  102  |  21  ----------------------------<  92  4.3   |  23  |  1.31<H>  05-10    142  |  105  |  27<H>  ----------------------------<  109<H>  4.9   |  23  |  1.39<H>    Ca    9.6      12 May 2018 06:30  Ca    9.7      11 May 2018 06:46  Ca    9.5      10 May 2018 04:41    TPro  7.2  /  Alb  4.1  /  TBili  0.4  /  DBili  x   /  AST  19  /  ALT  13  /  AlkPhos  75  05-10    CAPILLARY BLOOD GLUCOSE          RADIOLOGY & ADDITIONAL TESTS:      Consultant(s):    Care Discussed with Consultants/Other Providers [ ] YES  [ ] NO
Patient is a 89y old  Male who presents with a chief complaint of syncope (11 May 2018 16:31)      HPI:  Patent a little paranoid  Son says that he goes in and out of this.  Physically he is back to baseline and has been walking with the aides.    MEDICATIONS  (STANDING):  amLODIPine   Tablet 2.5 milliGRAM(s) Oral daily  atorvastatin 10 milliGRAM(s) Oral at bedtime  clopidogrel Tablet 75 milliGRAM(s) Oral daily  furosemide    Tablet 40 milliGRAM(s) Oral daily  heparin  Injectable 5000 Unit(s) SubCutaneous every 12 hours  metoprolol succinate ER 25 milliGRAM(s) Oral daily  mirabegron ER 50 milliGRAM(s) Oral daily  omega-3-Acid Ethyl Esters 2 Gram(s) Oral two times a day  rivastigmine patch  9.5 mG/24 Hr(s) 1 Patch Transdermal every 24 hours    MEDICATIONS  (PRN):      Allergies    No Known Allergies    Intolerances    morphine (Unknown)          VITALS:   T(C): 36.3 (05-13-18 @ 12:16), Max: 36.7 (05-12-18 @ 21:02)  HR: 76 (05-13-18 @ 12:16) (68 - 80)  BP: 168/92 (05-13-18 @ 12:16) (90/58 - 182/84)  RR: 19 (05-13-18 @ 12:16) (18 - 19)  SpO2: 96% (05-13-18 @ 12:16) (95% - 96%)  Wt(kg): --    05-12 @ 07:01  -  05-13 @ 07:00  --------------------------------------------------------  IN: 1160 mL / OUT: 0 mL / NET: 1160 mL    05-13 @ 07:01  -  05-13 @ 12:31  --------------------------------------------------------  IN: 180 mL / OUT: 0 mL / NET: 180 mL        PHYSICAL EXAM:  GENERAL: NAD, well nourished and conversant  HEAD:  Atraumatic  EYES: EOM, PERRLA, conjunctiva pink and sclera white  ENT: No tonsillar erythema, exudates, or enlargement, moist mucous membranes, good dentition, no lesions  NECK: Supple, No JVD, normal thyroid, carotids with normal upstrokes and no bruits  CHEST/LUNG: Clear to auscultation bilaterally, No rales, rhonchi, wheezing, or rubs  HEART: Regular rate and rhythm, No murmurs, rubs, or gallops  ABDOMEN: Soft, nondistended, no masses, guarding, tenderness or rebound, bowel sounds present  EXTREMITIES:  2+ Peripheral Pulses, No clubbing, cyanosis, or edema. No arthritis of shoulders, elbows, hands, hips, knees, ankles, or feet. No DJD C spine, T spine, or L/S spine  LYMPH: No lymphadenopathy noted  SKIN: No rashes or lesions  NERVOUS SYSTEM:  Alert & Oriented x2,  Motor Strength 5/5 right upper and right lower.  5/5 left upper and left lower extremities, DTRs 2+ intact and symmetric    LABS:                          14.0   7.8   )-----------( 158      ( 13 May 2018 06:03 )             40.7     05-13    138  |  99  |  27<H>  ----------------------------<  114<H>  3.9   |  26  |  1.44<H>  05-12    139  |  101  |  26<H>  ----------------------------<  104<H>  4.0   |  25  |  1.51<H>  05-11    139  |  102  |  21  ----------------------------<  92  4.3   |  23  |  1.31<H>    Ca    8.9      13 May 2018 06:03  Ca    9.6      12 May 2018 06:30  Ca    9.7      11 May 2018 06:46      CAPILLARY BLOOD GLUCOSE          RADIOLOGY & ADDITIONAL TESTS:      Consultant(s):    Care Discussed with Consultants/Other Providers [ ] YES  [ ] NO

## 2018-10-01 ENCOUNTER — APPOINTMENT (OUTPATIENT)
Dept: ULTRASOUND IMAGING | Facility: CLINIC | Age: 83
End: 2018-10-01

## 2019-02-06 ENCOUNTER — APPOINTMENT (OUTPATIENT)
Dept: CARDIOLOGY | Facility: CLINIC | Age: 84
End: 2019-02-06
Payer: MEDICARE

## 2019-02-06 ENCOUNTER — NON-APPOINTMENT (OUTPATIENT)
Age: 84
End: 2019-02-06

## 2019-02-06 VITALS
OXYGEN SATURATION: 99 % | SYSTOLIC BLOOD PRESSURE: 154 MMHG | DIASTOLIC BLOOD PRESSURE: 75 MMHG | WEIGHT: 180 LBS | HEART RATE: 65 BPM | BODY MASS INDEX: 25.2 KG/M2 | HEIGHT: 71 IN

## 2019-02-06 DIAGNOSIS — I10 ESSENTIAL (PRIMARY) HYPERTENSION: ICD-10-CM

## 2019-02-06 DIAGNOSIS — I25.10 ATHEROSCLEROTIC HEART DISEASE OF NATIVE CORONARY ARTERY W/OUT ANGINA PECTORIS: ICD-10-CM

## 2019-02-06 PROCEDURE — 99213 OFFICE O/P EST LOW 20 MIN: CPT

## 2019-02-06 PROCEDURE — 93000 ELECTROCARDIOGRAM COMPLETE: CPT

## 2019-02-06 RX ORDER — FINASTERIDE 5 MG/1
5 TABLET, FILM COATED ORAL DAILY
Refills: 0 | Status: ACTIVE | COMMUNITY

## 2019-02-06 RX ORDER — FUROSEMIDE 40 MG/1
40 TABLET ORAL DAILY
Refills: 5 | Status: ACTIVE | COMMUNITY

## 2019-02-06 RX ORDER — TAMSULOSIN HYDROCHLORIDE 0.4 MG/1
0.4 CAPSULE ORAL
Refills: 0 | Status: ACTIVE | COMMUNITY

## 2019-02-06 NOTE — HISTORY OF PRESENT ILLNESS
[FreeTextEntry1] : Doing okay. Has trouble getting around. Complains of some thigh pains when he tries to get up from a sitting position. Denies chest pain, shortness of breath or palpitations.

## 2019-02-06 NOTE — REVIEW OF SYSTEMS
[Shortness Of Breath] : no shortness of breath [Dyspnea on exertion] : not dyspnea during exertion [Chest  Pressure] : no chest pressure [Chest Pain] : no chest pain [Lower Ext Edema] : lower extremity edema [Leg Claudication] : no intermittent leg claudication [Palpitations] : no palpitations [Negative] : Heme/Lymph

## 2019-02-06 NOTE — DISCUSSION/SUMMARY
[Coronary Artery Disease] : coronary artery disease [Stable] : stable [Hypertension] : hypertension [FreeTextEntry1] : \par Currently stable from a cardiovascular standpoint. Hypertensive today. Patient dependent edema likely secondary to venous insufficiency. Stable CAD. No ischemic or CHF symptoms. Unclear etiology of thigh pains. Will discontinue atorvastatin at this time and monitor symptoms. Continue all other current medications. ECG completed today and reviewed. Follow up in 6 months.

## 2019-02-06 NOTE — PHYSICAL EXAM
[General Appearance - Well Developed] : well developed [Normal Appearance] : normal appearance [Well Groomed] : well groomed [General Appearance - Well Nourished] : well nourished [No Deformities] : no deformities [General Appearance - In No Acute Distress] : no acute distress [Normal Conjunctiva] : the conjunctiva exhibited no abnormalities [Eyelids - No Xanthelasma] : the eyelids demonstrated no xanthelasmas [Normal Oral Mucosa] : normal oral mucosa [No Oral Pallor] : no oral pallor [No Oral Cyanosis] : no oral cyanosis [Respiration, Rhythm And Depth] : normal respiratory rhythm and effort [Exaggerated Use Of Accessory Muscles For Inspiration] : no accessory muscle use [Auscultation Breath Sounds / Voice Sounds] : lungs were clear to auscultation bilaterally [Heart Rate And Rhythm] : heart rate and rhythm were normal [Heart Sounds] : normal S1 and S2 [Murmurs] : no murmurs present [Abdomen Soft] : soft [Abdomen Tenderness] : non-tender [FreeTextEntry1] : using wheelchair [Nail Clubbing] : no clubbing of the fingernails [Cyanosis, Localized] : no localized cyanosis [Petechial Hemorrhages (___cm)] : no petechial hemorrhages [Skin Color & Pigmentation] : normal skin color and pigmentation [] : no rash [No Venous Stasis] : no venous stasis [Skin Lesions] : no skin lesions [No Skin Ulcers] : no skin ulcer [No Xanthoma] : no  xanthoma was observed [Oriented To Time, Place, And Person] : oriented to person, place, and time

## 2019-06-23 ENCOUNTER — RX RENEWAL (OUTPATIENT)
Age: 84
End: 2019-06-23

## 2019-06-23 RX ORDER — ATORVASTATIN CALCIUM 10 MG/1
10 TABLET, FILM COATED ORAL
Qty: 90 | Refills: 3 | Status: ACTIVE | COMMUNITY
Start: 2019-06-23 | End: 1900-01-01

## 2019-09-13 ENCOUNTER — INPATIENT (INPATIENT)
Facility: HOSPITAL | Age: 84
LOS: 10 days | Discharge: HOME | DRG: 690 | End: 2019-09-24
Attending: INTERNAL MEDICINE | Admitting: INTERNAL MEDICINE
Payer: MEDICARE

## 2019-09-13 VITALS
HEART RATE: 100 BPM | RESPIRATION RATE: 20 BRPM | DIASTOLIC BLOOD PRESSURE: 63 MMHG | TEMPERATURE: 98 F | OXYGEN SATURATION: 96 % | SYSTOLIC BLOOD PRESSURE: 126 MMHG

## 2019-09-13 DIAGNOSIS — F03.90 UNSPECIFIED DEMENTIA WITHOUT BEHAVIORAL DISTURBANCE: ICD-10-CM

## 2019-09-13 DIAGNOSIS — R31.0 GROSS HEMATURIA: ICD-10-CM

## 2019-09-13 DIAGNOSIS — R31.9 HEMATURIA, UNSPECIFIED: ICD-10-CM

## 2019-09-13 DIAGNOSIS — I25.10 ATHEROSCLEROTIC HEART DISEASE OF NATIVE CORONARY ARTERY WITHOUT ANGINA PECTORIS: ICD-10-CM

## 2019-09-13 DIAGNOSIS — D50.0 IRON DEFICIENCY ANEMIA SECONDARY TO BLOOD LOSS (CHRONIC): ICD-10-CM

## 2019-09-13 DIAGNOSIS — I48.91 UNSPECIFIED ATRIAL FIBRILLATION: ICD-10-CM

## 2019-09-13 DIAGNOSIS — E78.00 PURE HYPERCHOLESTEROLEMIA, UNSPECIFIED: ICD-10-CM

## 2019-09-13 DIAGNOSIS — I10 ESSENTIAL (PRIMARY) HYPERTENSION: ICD-10-CM

## 2019-09-13 LAB
ALBUMIN SERPL ELPH-MCNC: 3.7 G/DL — SIGNIFICANT CHANGE UP (ref 3.3–5)
ALP SERPL-CCNC: 69 U/L — SIGNIFICANT CHANGE UP (ref 40–120)
ALT FLD-CCNC: 34 U/L — SIGNIFICANT CHANGE UP (ref 10–45)
ANION GAP SERPL CALC-SCNC: 13 MMOL/L — SIGNIFICANT CHANGE UP (ref 5–17)
APPEARANCE UR: ABNORMAL
APTT BLD: 27 SEC — LOW (ref 27.5–36.3)
AST SERPL-CCNC: 32 U/L — SIGNIFICANT CHANGE UP (ref 10–40)
BASOPHILS # BLD AUTO: 0 K/UL — SIGNIFICANT CHANGE UP (ref 0–0.2)
BASOPHILS NFR BLD AUTO: 0.2 % — SIGNIFICANT CHANGE UP (ref 0–2)
BILIRUB SERPL-MCNC: 0.3 MG/DL — SIGNIFICANT CHANGE UP (ref 0.2–1.2)
BILIRUB UR-MCNC: NEGATIVE — SIGNIFICANT CHANGE UP
BLD GP AB SCN SERPL QL: NEGATIVE — SIGNIFICANT CHANGE UP
BUN SERPL-MCNC: 30 MG/DL — HIGH (ref 7–23)
CALCIUM SERPL-MCNC: 9.4 MG/DL — SIGNIFICANT CHANGE UP (ref 8.4–10.5)
CHLORIDE SERPL-SCNC: 106 MMOL/L — SIGNIFICANT CHANGE UP (ref 96–108)
CO2 SERPL-SCNC: 24 MMOL/L — SIGNIFICANT CHANGE UP (ref 22–31)
COLOR SPEC: SIGNIFICANT CHANGE UP
CREAT SERPL-MCNC: 1.63 MG/DL — HIGH (ref 0.5–1.3)
DIFF PNL FLD: ABNORMAL
EOSINOPHIL # BLD AUTO: 0.1 K/UL — SIGNIFICANT CHANGE UP (ref 0–0.5)
EOSINOPHIL NFR BLD AUTO: 0.9 % — SIGNIFICANT CHANGE UP (ref 0–6)
GAS PNL BLDV: SIGNIFICANT CHANGE UP
GLUCOSE SERPL-MCNC: 126 MG/DL — HIGH (ref 70–99)
GLUCOSE UR QL: NEGATIVE — SIGNIFICANT CHANGE UP
HCT VFR BLD CALC: 23.3 % — LOW (ref 39–50)
HCT VFR BLD CALC: 24.1 % — LOW (ref 39–50)
HCT VFR BLD CALC: 25.9 % — LOW (ref 39–50)
HCT VFR BLD CALC: 29.9 % — LOW (ref 39–50)
HGB BLD-MCNC: 10 G/DL — LOW (ref 13–17)
HGB BLD-MCNC: 8.1 G/DL — LOW (ref 13–17)
HGB BLD-MCNC: 8.1 G/DL — LOW (ref 13–17)
HGB BLD-MCNC: 8.7 G/DL — LOW (ref 13–17)
INR BLD: 1.1 RATIO — SIGNIFICANT CHANGE UP (ref 0.88–1.16)
KETONES UR-MCNC: NEGATIVE — SIGNIFICANT CHANGE UP
LDH SERPL L TO P-CCNC: 209 U/L — SIGNIFICANT CHANGE UP (ref 50–242)
LEUKOCYTE ESTERASE UR-ACNC: ABNORMAL
LYMPHOCYTES # BLD AUTO: 1.5 K/UL — SIGNIFICANT CHANGE UP (ref 1–3.3)
LYMPHOCYTES # BLD AUTO: 21.2 % — SIGNIFICANT CHANGE UP (ref 13–44)
MCHC RBC-ENTMCNC: 33.3 GM/DL — SIGNIFICANT CHANGE UP (ref 32–36)
MCHC RBC-ENTMCNC: 33.4 GM/DL — SIGNIFICANT CHANGE UP (ref 32–36)
MCHC RBC-ENTMCNC: 33.5 GM/DL — SIGNIFICANT CHANGE UP (ref 32–36)
MCHC RBC-ENTMCNC: 33.5 PG — SIGNIFICANT CHANGE UP (ref 27–34)
MCHC RBC-ENTMCNC: 33.8 PG — SIGNIFICANT CHANGE UP (ref 27–34)
MCHC RBC-ENTMCNC: 33.8 PG — SIGNIFICANT CHANGE UP (ref 27–34)
MCHC RBC-ENTMCNC: 34.6 GM/DL — SIGNIFICANT CHANGE UP (ref 32–36)
MCHC RBC-ENTMCNC: 34.9 PG — HIGH (ref 27–34)
MCV RBC AUTO: 100 FL — SIGNIFICANT CHANGE UP (ref 80–100)
MCV RBC AUTO: 101 FL — HIGH (ref 80–100)
MONOCYTES # BLD AUTO: 0.7 K/UL — SIGNIFICANT CHANGE UP (ref 0–0.9)
MONOCYTES NFR BLD AUTO: 10.1 % — SIGNIFICANT CHANGE UP (ref 2–14)
NEUTROPHILS # BLD AUTO: 4.9 K/UL — SIGNIFICANT CHANGE UP (ref 1.8–7.4)
NEUTROPHILS NFR BLD AUTO: 67.7 % — SIGNIFICANT CHANGE UP (ref 43–77)
NITRITE UR-MCNC: NEGATIVE — SIGNIFICANT CHANGE UP
PH UR: 6 — SIGNIFICANT CHANGE UP (ref 5–8)
PLATELET # BLD AUTO: 130 K/UL — LOW (ref 150–400)
PLATELET # BLD AUTO: 138 K/UL — LOW (ref 150–400)
PLATELET # BLD AUTO: 146 K/UL — LOW (ref 150–400)
PLATELET # BLD AUTO: 167 K/UL — SIGNIFICANT CHANGE UP (ref 150–400)
POTASSIUM SERPL-MCNC: 4.4 MMOL/L — SIGNIFICANT CHANGE UP (ref 3.5–5.3)
POTASSIUM SERPL-SCNC: 4.4 MMOL/L — SIGNIFICANT CHANGE UP (ref 3.5–5.3)
PROT SERPL-MCNC: 6.5 G/DL — SIGNIFICANT CHANGE UP (ref 6–8.3)
PROT UR-MCNC: ABNORMAL
PROTHROM AB SERPL-ACNC: 12.6 SEC — SIGNIFICANT CHANGE UP (ref 10–12.9)
RBC # BLD: 2.31 M/UL — LOW (ref 4.2–5.8)
RBC # BLD: 2.39 M/UL — LOW (ref 4.2–5.8)
RBC # BLD: 2.56 M/UL — LOW (ref 4.2–5.8)
RBC # BLD: 2.97 M/UL — LOW (ref 4.2–5.8)
RBC # FLD: 13.3 % — SIGNIFICANT CHANGE UP (ref 10.3–14.5)
RBC # FLD: 13.3 % — SIGNIFICANT CHANGE UP (ref 10.3–14.5)
RBC # FLD: 13.4 % — SIGNIFICANT CHANGE UP (ref 10.3–14.5)
RBC # FLD: 13.4 % — SIGNIFICANT CHANGE UP (ref 10.3–14.5)
RH IG SCN BLD-IMP: POSITIVE — SIGNIFICANT CHANGE UP
SODIUM SERPL-SCNC: 143 MMOL/L — SIGNIFICANT CHANGE UP (ref 135–145)
SP GR SPEC: 1.01 — SIGNIFICANT CHANGE UP (ref 1.01–1.02)
UROBILINOGEN FLD QL: NEGATIVE — SIGNIFICANT CHANGE UP
WBC # BLD: 7.2 K/UL — SIGNIFICANT CHANGE UP (ref 3.8–10.5)
WBC # BLD: 7.7 K/UL — SIGNIFICANT CHANGE UP (ref 3.8–10.5)
WBC # BLD: 7.9 K/UL — SIGNIFICANT CHANGE UP (ref 3.8–10.5)
WBC # BLD: 9.3 K/UL — SIGNIFICANT CHANGE UP (ref 3.8–10.5)
WBC # FLD AUTO: 7.2 K/UL — SIGNIFICANT CHANGE UP (ref 3.8–10.5)
WBC # FLD AUTO: 7.7 K/UL — SIGNIFICANT CHANGE UP (ref 3.8–10.5)
WBC # FLD AUTO: 7.9 K/UL — SIGNIFICANT CHANGE UP (ref 3.8–10.5)
WBC # FLD AUTO: 9.3 K/UL — SIGNIFICANT CHANGE UP (ref 3.8–10.5)

## 2019-09-13 PROCEDURE — 74018 RADEX ABDOMEN 1 VIEW: CPT | Mod: 26

## 2019-09-13 PROCEDURE — 99285 EMERGENCY DEPT VISIT HI MDM: CPT | Mod: 25

## 2019-09-13 PROCEDURE — 99222 1ST HOSP IP/OBS MODERATE 55: CPT

## 2019-09-13 PROCEDURE — 71045 X-RAY EXAM CHEST 1 VIEW: CPT | Mod: 26

## 2019-09-13 PROCEDURE — 76770 US EXAM ABDO BACK WALL COMP: CPT | Mod: 26

## 2019-09-13 PROCEDURE — 99223 1ST HOSP IP/OBS HIGH 75: CPT

## 2019-09-13 PROCEDURE — 51700 IRRIGATION OF BLADDER: CPT

## 2019-09-13 RX ORDER — ONDANSETRON 8 MG/1
4 TABLET, FILM COATED ORAL EVERY 6 HOURS
Refills: 0 | Status: DISCONTINUED | OUTPATIENT
Start: 2019-09-13 | End: 2019-09-24

## 2019-09-13 RX ORDER — MIRABEGRON 50 MG/1
1 TABLET, EXTENDED RELEASE ORAL
Qty: 0 | Refills: 0 | DISCHARGE

## 2019-09-13 RX ORDER — SODIUM CHLORIDE 9 MG/ML
1000 INJECTION INTRAMUSCULAR; INTRAVENOUS; SUBCUTANEOUS ONCE
Refills: 0 | Status: COMPLETED | OUTPATIENT
Start: 2019-09-13 | End: 2019-09-13

## 2019-09-13 RX ORDER — RIVASTIGMINE 4.6 MG/24H
1 PATCH, EXTENDED RELEASE TRANSDERMAL EVERY 24 HOURS
Refills: 0 | Status: DISCONTINUED | OUTPATIENT
Start: 2019-09-13 | End: 2019-09-24

## 2019-09-13 RX ORDER — SODIUM CHLORIDE 9 MG/ML
1000 INJECTION, SOLUTION INTRAVENOUS ONCE
Refills: 0 | Status: COMPLETED | OUTPATIENT
Start: 2019-09-13 | End: 2019-09-13

## 2019-09-13 RX ORDER — SODIUM CHLORIDE 9 MG/ML
1000 INJECTION INTRAMUSCULAR; INTRAVENOUS; SUBCUTANEOUS ONCE
Refills: 0 | Status: DISCONTINUED | OUTPATIENT
Start: 2019-09-13 | End: 2019-09-13

## 2019-09-13 RX ORDER — ATORVASTATIN CALCIUM 80 MG/1
10 TABLET, FILM COATED ORAL AT BEDTIME
Refills: 0 | Status: DISCONTINUED | OUTPATIENT
Start: 2019-09-13 | End: 2019-09-24

## 2019-09-13 RX ORDER — INFLUENZA VIRUS VACCINE 15; 15; 15; 15 UG/.5ML; UG/.5ML; UG/.5ML; UG/.5ML
0.5 SUSPENSION INTRAMUSCULAR ONCE
Refills: 0 | Status: COMPLETED | OUTPATIENT
Start: 2019-09-13 | End: 2019-09-13

## 2019-09-13 RX ORDER — OMEGA-3 ACID ETHYL ESTERS 1 G
2 CAPSULE ORAL
Refills: 0 | Status: DISCONTINUED | OUTPATIENT
Start: 2019-09-13 | End: 2019-09-13

## 2019-09-13 RX ORDER — CEFTRIAXONE 500 MG/1
1000 INJECTION, POWDER, FOR SOLUTION INTRAMUSCULAR; INTRAVENOUS ONCE
Refills: 0 | Status: COMPLETED | OUTPATIENT
Start: 2019-09-13 | End: 2019-09-13

## 2019-09-13 RX ORDER — ACETAMINOPHEN 500 MG
650 TABLET ORAL EVERY 4 HOURS
Refills: 0 | Status: DISCONTINUED | OUTPATIENT
Start: 2019-09-13 | End: 2019-09-24

## 2019-09-13 RX ADMIN — RIVASTIGMINE 1 PATCH: 4.6 PATCH, EXTENDED RELEASE TRANSDERMAL at 22:04

## 2019-09-13 RX ADMIN — SODIUM CHLORIDE 1000 MILLILITER(S): 9 INJECTION INTRAMUSCULAR; INTRAVENOUS; SUBCUTANEOUS at 10:38

## 2019-09-13 RX ADMIN — SODIUM CHLORIDE 1000 MILLILITER(S): 9 INJECTION, SOLUTION INTRAVENOUS at 09:42

## 2019-09-13 RX ADMIN — SODIUM CHLORIDE 1000 MILLILITER(S): 9 INJECTION, SOLUTION INTRAVENOUS at 09:29

## 2019-09-13 RX ADMIN — CEFTRIAXONE 1000 MILLIGRAM(S): 500 INJECTION, POWDER, FOR SOLUTION INTRAMUSCULAR; INTRAVENOUS at 10:12

## 2019-09-13 RX ADMIN — CEFTRIAXONE 100 MILLIGRAM(S): 500 INJECTION, POWDER, FOR SOLUTION INTRAMUSCULAR; INTRAVENOUS at 09:59

## 2019-09-13 RX ADMIN — ATORVASTATIN CALCIUM 10 MILLIGRAM(S): 80 TABLET, FILM COATED ORAL at 22:04

## 2019-09-13 NOTE — H&P ADULT - HISTORY OF PRESENT ILLNESS
89 yo male with Hx of CAD s/p PCI x2 on plavix, Atrial Fibrillation, HLD, HTN, Dementia , UTI was brought in the hospital from [facility] due to report of Stanislaw hematuria.  A CBI was placed in the ED with reported of about one Liter of urine with Blood and clots .      in the ED   :  Pt received 2 L of IVF boluses and Ceftriaxone 1 Gm. 89 yo male with Hx of CAD s/p PCI x2 on plavix, Atrial Fibrillation, HLD, HTN, Dementia , UTI was brought in the hospital from [facility] due to report of Stanislaw hematuria.  A CBI was placed in the ED with reported of about one Liter of urine with Blood and clots .      in the ED   :  Pt received 2 L of IVF boluses and Ceftriaxone 1 Gm. 89 yo male with Hx of CAD s/p PCI x2 as reported, TIA on plavix, Atrial Fibrillation on no AC , HLD, HTN, Dementia , UTI was brought in the hospital from [facility] due to report of Ghada hematuria.  AS per daughter, pt has recurrennt UTI and was recently seen by Urologist who prescribed Monural for 3 doses.  Pt started having ghada hematuria started 2-3 days ago with no fever or reported abd pain.  As per PMD daughter reported abd distension.  A CBI was placed in the ED with reported of about one Liter of urine with Blood and clots .      in the ED   :  Pt received 2 L of IVF boluses and Ceftriaxone 1 Gm. 89 yo male with Hx of CAD s/p PCI x2 as reported, TIA on plavix, Atrial Fibrillation on no AC , HLD, HTN, Dementia , UTI was brought in the hospital from [facility] due to report of Ghada hematuria.  AS per daughter, pt has recurrennt UTI and was recently seen by Urologist who prescribed Monural for 3 doses , patient received 2 doses so far and started having the ghada hematuria.  Pt started having ghada hematuria started 2-3 days ago with no fever or reported abd pain.  As per daughter, patient was in hosp in March or April 2019 at another hosp for UTI and was dc to [facility] and subsequently dc to home.  As per PMD daughter reported abd distension.  A CBI was placed in the ED with reported of about one Liter of urine with Blood and clots .      in the ED   :  Pt received 2 L of IVF boluses and Ceftriaxone 1 Gm.

## 2019-09-13 NOTE — ED ADULT NURSE REASSESSMENT NOTE - NS ED NURSE REASSESS COMMENT FT1
Pt at xray
Pt back from Ultrasound, resting comfortably. VSS. Dysphasia screen performed, tolerated well. No noted cough, drooling or slurred speech. Waiting for bed.
Pt went from xray to ultrasound.
Received report from Alexandra PINON. Pt resting comfortably, A&Ox2. Urology at bedside.
Bladder irrigation done by the urology team.  Urine output color now pale red. Vitals signs stable at present. Repeat CB sent as ordered. Awaiting disposition.

## 2019-09-13 NOTE — H&P ADULT - NSICDXPASTSURGICALHX_GEN_ALL_CORE_FT
PAST SURGICAL HISTORY:  h/o Hernia Repair     S/P percutaneous transluminal coronary angioplasty PCI: stent x2

## 2019-09-13 NOTE — ED PROVIDER NOTE - PHYSICAL EXAMINATION
General: mild distress  HEENT: EOMI, PERRLA, normal mucosa, normal oropharynx, no lesions on the lips or on oral mucosa, normal external ear  Neck: supple, no lymphadenopathy, full range of motion, no nuchal rigidity  CV: RRR, normal S1 and S2 with no murmur, capillary refill less than two seconds  Resp: lungs CTA b/l, good aeration bilaterally, symmetric chest wall   Abd: distended, ttp in all quadrants, suprapubic ttp  : no CVA tenderness, Testicular exam with normal lie and CMR bilaterally, no significant erythema, tenderness or swelling appreciated. No appreciable inguinal hernia bilaterally. No rashes or lesions. blood at urethral meatus, clots all around pts groing. (chaperone- demar butler, rn)   MSK: no gross deformities  Neuro: moving all extremities  Skin: no rashes, skin intact

## 2019-09-13 NOTE — H&P ADULT - NSHPOUTPATIENTPROVIDERS_GEN_ALL_CORE
[place] PCP          : Jimmy Franco [phone]  Urologist  : Dr Valencia   Cardiologist  : Dr Zelaya PCP          : Jimmy Franco [phone]  Urologist  : Dr Valencia and Dr Goldberg  ID             : Dr Kimmy Yeager   Cardio      : ? Dr Zelaya   Cardiologist  : Dr Zelaya PCP          : Jimmy Franco [phone]  Urologist  : Dr Valencia and Dr Goldberg  ID             : Dr Kimmy Yeager   Cardiologist  : Dr Zelaya

## 2019-09-13 NOTE — CHART NOTE - NSCHARTNOTEFT_GEN_A_CORE
Contacted by Dr. Herrera of [facility]--this patient is known to them from the clinic. They will resume care of patient starting tomorrow AM (9/14). Can still contact my group for acute issue in the interim.    Checo Landis MD  Pager [phone]  After 5pm and on weekends call [phone]

## 2019-09-13 NOTE — CHART NOTE - NSCHARTNOTEFT_GEN_A_CORE
89 yo male with Hx of CAD s/p PCI x2 as reported, TIA on plavix, Atrial Fibrillation on no AC , HLD, HTN, Dementia , UTI was brought in the hospital from [facility] due to report of Ghada hematuria.  AS per daughter, pt has recurrennt UTI and was recently seen by Urologist who prescribed Monural for 3 doses , patient received 2 doses so far and started having the ghada hematuria.  Pt started having ghada hematuria started 2-3 days ago with no fever or reported abd pain.  As per daughter, patient was in hosp in March or April 2019 at another hosp for UTI and was dc to [facility] and subsequently dc to home.  As per PMD daughter reported abd distension.  A CBI was placed in the ED with reported of about one Liter of urine with Blood and clots .     2 L of IVF boluses and Ceftriaxone 1 Gm.       ·  Problem: Gross hematuria.  Plan: CBI placed in the ED   Urologist ( Dr Goldberg has seen pt  F/UP Urine CX and UA   (+) hemorrhagic cystitis Started on antiibiotics pending culture results    pt received Ceftriaxone in the ED  ID consult is called ( LAOTYA Oneal).     Problem/Plan - 2:  ·  Problem: Anemia due to blood loss.  Plan: with decreasing h/H   Will cont to monitor Hem , Next one at 8PM   Transfuse if <8.0 with active bleed and since pt has CAD   TYpe and scree in place.      Problem/Plan - 3:  ·  Problem: Atrial fibrillation, unspecified type.  Plan: Rate control   not on AC.      Problem/Plan - 4:  ·  Problem: Dementia without behavioral disturbance, unspecified dementia type.  Plan: COnt Exelon.      Problem/Plan - 5:  ·  Problem: Hypercholesteremia.  Plan: COnt atorvastatin.      Problem/Plan - 6:  Problem: Essential hypertension. Plan: HOLD  Antihypertensive now  Please restart if BP remains stable.     Problem/Plan - 7:  ·  Problem: Coronary artery disease involving native coronary artery of native heart, angina presence unspecified.  Plan: HOLD Plavix.    Will continue to follow patient as the medical attending service. Case discussed with his  wife and Dr Montesinos of       Jimmy Maria MD

## 2019-09-13 NOTE — ED PROVIDER NOTE - CLINICAL SUMMARY MEDICAL DECISION MAKING FREE TEXT BOX
89 yo m pw ghada hematuria while on abx for UTI. pt hemodynamically stable, physical exam significant for ghada hematuria at meatus and clots surrounding groin. abd distended, relief when three way catheter placed. approximately 1 L of blood w/ clots emitted. required multiple flushes to remove and relieve clots. pt unable to 89 yo m pw ghada hematuria while on abx for UTI. pt hemodynamically stable, physical exam significant for ghada hematuria at meatus and clots surrounding groin. abd distended, relief when three way catheter placed. approximately 1 L of blood w/ clots emitted. required multiple flushes to remove and relieve clots. pt unable to provide adequate hpi/ros. uro consulted and at bedside. labs. reassess. Araujo: 89 yo m pw ghada hematuria while on abx for UTI. pt hemodynamically stable, physical exam significant for ghada hematuria at meatus and clots surrounding groin. abd distended, relief when three way catheter placed. approximately 1 L of blood w/ clots emitted. required multiple flushes to remove and relieve clots. pt unable to provide adequate hpi/ros. uro consulted and at bedside. pt with significant blood loss, will be admitted for monitoring and blood transfusion and urologic intervention.

## 2019-09-13 NOTE — H&P ADULT - ASSESSMENT
89 yo male with Hx of CAD s/p PCI x2 on plavix, Atrial Fibrillation, HLD, HTN, Dementia , UTI was brought in the hospital from [facility] due to report of Stanislaw hematuria s/p CBI with noted Macrocytic Anemia with significant drop in Hemoglobin.

## 2019-09-13 NOTE — H&P ADULT - COMMENTS
Patient is unable to provide and Daughter who is at bedside does not live with patient , pt has an aid as she states Patient is unable to provide much history and Daughter who is at bedside does not live with patient .

## 2019-09-13 NOTE — ED PROCEDURE NOTE - CPROC ED INDICATIONS1
bladder distention/obtain UA/gross hematuria with clots/continuous bladder irrigation/critical patient/measure output

## 2019-09-13 NOTE — PATIENT PROFILE ADULT - FUNCTIONAL SCREEN CURRENT LEVEL: COMMUNICATION, MLM
2 = difficulty understanding (not related to language barrier) communicates well/2 = difficulty understanding (not related to language barrier)

## 2019-09-13 NOTE — H&P ADULT - NSHPLABSRESULTS_GEN_ALL_CORE
Lab results are reviewed by me with noted  : WBC = 9.3   H/H = 8.7  / 25.9   ( Previous hem today was 10.0  in May 2019 Hemo was 14.2 )     PLT = 138   MCV = 101   Cr 1.63  ( previous Cr = 1.51) Lab results are reviewed by me with noted  : WBC = 9.3   H/H = 8.7  / 25.9   ( Previous hem today was 10.0  in May 2019 Hemo was 14.2 )     PLT = 138   MCV = 101   Cr 1.63  ( previous Cr = 1.5Patient is doing well. No fever chills or dyspnea. No chest pain or significant arrhythmia .    Pain  -- / 10    Pain not well controlled consider Pain management service consult    Blood loss anemia . no transfudion needed , Give Ferrous sulfate 325 mg po bid     Prerenal azotemia increase fluid intake by 20 ounces . If not able to take in enough fluid then give 500 cc bolus of 1/2 normal saline over 5 hours.    Thrombocytopenia    Renal insufficinecy    Hyponatremia     Diabetic control1) Lab results are reviewed by me with noted  : WBC = 9.3   H/H = 8.7  / 25.9   ( Previous hem today was 10.0  in May 2019 Hemo was 14.2 )     PLT = 138   MCV = 101   Cr 1.63  ( previous Cr = 1.5Patient is doing well. No fever chills or dyspnea. No chest pain or significant arrhythmia .    Pain  -- / 10    Pain not well controlled consider Pain management service consult    Blood loss anemia . no transfusion needed , Give Ferrous sulfate 325 mg po bid     Prerenal azotemia increase fluid intake by 20 ounces . If not able to take in enough fluid then give 500 cc bolus of 1/2 normal saline over 5 hours.    Thrombocytopenia    Renal insufficiency    Hyponatremia     Diabetic control1)

## 2019-09-13 NOTE — ED PROVIDER NOTE - OBJECTIVE STATEMENT
89 yo m  A Fib (unk if on A/C), Dementia, HTN, HLD, BIBEMS from [facility], 89 yo m  A Fib (unk if on A/C), Dementia, HTN, HLD, BIBEMS from [facility] w/ ghada hematuria. states pt was recently placed on abx for UTI. today was noted to have ghada hematuria. unaware of previous hx of similar sx. pt unable to provide hpi/ros due to dementia. 89 yo m  A Fib (on Plavix), Dementia, HTN, HLD, BIBEMS from [facility] w/ ghada hematuria. states pt was recently placed on Monurol for UTI. today was noted to have ghada hematuria. unaware of previous hx of similar sx. pt unable to provide hpi/ros due to dementia.

## 2019-09-13 NOTE — H&P ADULT - PROBLEM SELECTOR PLAN 1
CBI placed in the ED   Urologist ( Dr Goldberg has seen pt )   F/UP Urine CX and UA   pt received Ceftriaxone in the ED  ID consult is called ( LATOYA Oneal) CBI placed in the ED   Urologist ( Dr Goldberg has seen pt )   F/UP Urine CX and UA     pt received Ceftriaxone in the ED  ID consult is called ( LATOYA Oneal) CBI placed in the ED   Urologist ( Dr Goldberg has seen p  F/UP Urine CX and UA   (+) hemorrhagic cystitis Started on antiibiotics pending culture results    pt received Ceftriaxone in the ED  ID consult is called ( LATOYA Oneal) CBI placed in the ED   Urologist ( Dr Goldberg has seen pt  F/UP Urine CX and UA   (+) hemorrhagic cystitis Started on antiibiotics pending culture results    pt received Ceftriaxone in the ED  ID consult is called ( LATOYA Oneal)

## 2019-09-13 NOTE — H&P ADULT - PROBLEM SELECTOR PLAN 2
with decreasing h/H   Will cont to monitor Hem , Next one at 8PM   Transfuse if <8.0 with active bleed and since pt has CAD   TYpe and scree in place

## 2019-09-13 NOTE — ED ADULT NURSE NOTE - NSIMPLEMENTINTERV_GEN_ALL_ED
Implemented All Fall with Harm Risk Interventions:  [place] to call system. Call bell, personal items and telephone within reach. Instruct patient to call for assistance. Room bathroom lighting operational. Non-slip footwear when patient is off stretcher. Physically safe environment: no spills, clutter or unnecessary equipment. Stretcher in lowest position, wheels locked, appropriate side rails in place. Provide visual cue, wrist band, yellow gown, etc. Monitor gait and stability. Monitor for mental status changes and reorient to person, place, and time. Review medications for side effects contributing to fall risk. Reinforce activity limits and safety measures with patient and family. Provide visual clues: red socks.

## 2019-09-13 NOTE — H&P ADULT - NSHPPHYSICALEXAM_GEN_ALL_CORE
PHYSICAL EXAM:    GENERAL: Disoriented well nourished and minimally conversant  HEAD:  Atraumatic  EYES: EOM, PERRLA, conjunctiva pink and sclera white  ENT: No tonsillar erythema, exudates, or enlargement, moist mucous membranes, good dentition, no lesions  NECK: Supple, No JVD, normal thyroid, carotids with normal upstrokes and no bruits  CHEST/LUNG: Clear to auscultation bilaterally, No rales, rhonchi, wheezing, or rubs  HEART: Regular rate and rhythm, No murmurs, rubs, or gallops  ABDOMEN: Soft, (+)distended, no masses, guarding, tenderness or rebound, bowel sounds present 3 way urinary catheter with clots flushed out  EXTREMITIES:  2+ Peripheral Pulses, No clubbing, cyanosis, or edema. No arthritis of shoulders, elbows, hands, hips, knees, ankles, or feet. No DJD C spine, T spine, or L/S spine  LYMPH: No lymphadenopathy noted  SKIN: No rashes or lesions  NERVOUS SYSTEM:  Alert & Disoriented X1, abnormal cognitive function. Motor Strength 5/5 right upper and right lower.  5/5 left upper and left lower extremities, DTRs 2+ intact and symmetric

## 2019-09-13 NOTE — ED ADULT NURSE NOTE - OBJECTIVE STATEMENT
89 yrs old male with multiple medical history including dementia was sent to the ER via EMS from [facility] for hematuria. Pt is Aox1 and is unable to give details about his symptoms. As per EmS hematuria was noticed by the [facility] staff this morning. Upon assessment pt was actively bleeding from the penis with bright red clots. Dr. Araujo inserted a 3 way Mai catheter and CBI in progress. Urology consult in progress at this time. 89 yrs old male with multiple medical history including dementia was sent to the ER via EMS from [facility] for hematuria. Pt is Aox1 and is unable to give details about his symptoms. As per EmS hematuria was noticed by the [facility] staff this morning. Upon assessment pt was actively bleeding from the penis with bright red clots. Dr. Araujo inserted a 3 way Mai catheter and CBI in progress. Urology consult in progress at this time. Bilateral 1+ pedal edema noted to janny. feet.

## 2019-09-13 NOTE — H&P ADULT - NSICDXPASTMEDICALHX_GEN_ALL_CORE_FT
PAST MEDICAL HISTORY:  Atrial fibrillation     Back pain     CAD (Coronary Artery Disease)     Dementia     Fall     HTN - Hypertension     Hypercholesteremia     UTI (urinary tract infection) MDR E.coli

## 2019-09-13 NOTE — ED ADULT NURSE NOTE - ED CARDIAC RHYTHM
DISPLAY PLAN FREE TEXT DISPLAY PLAN FREE TEXT DISPLAY PLAN FREE TEXT DISPLAY PLAN FREE TEXT DISPLAY PLAN FREE TEXT regular

## 2019-09-14 LAB
ALBUMIN SERPL ELPH-MCNC: 3 G/DL — LOW (ref 3.3–5)
ALP SERPL-CCNC: 55 U/L — SIGNIFICANT CHANGE UP (ref 40–120)
ALT FLD-CCNC: 30 U/L — SIGNIFICANT CHANGE UP (ref 10–45)
ANION GAP SERPL CALC-SCNC: 12 MMOL/L — SIGNIFICANT CHANGE UP (ref 5–17)
AST SERPL-CCNC: 29 U/L — SIGNIFICANT CHANGE UP (ref 10–40)
BASOPHILS # BLD AUTO: 0.06 K/UL — SIGNIFICANT CHANGE UP (ref 0–0.2)
BASOPHILS NFR BLD AUTO: 0.8 % — SIGNIFICANT CHANGE UP (ref 0–2)
BILIRUB SERPL-MCNC: 0.3 MG/DL — SIGNIFICANT CHANGE UP (ref 0.2–1.2)
BUN SERPL-MCNC: 26 MG/DL — HIGH (ref 7–23)
CALCIUM SERPL-MCNC: 9.1 MG/DL — SIGNIFICANT CHANGE UP (ref 8.4–10.5)
CHLORIDE SERPL-SCNC: 108 MMOL/L — SIGNIFICANT CHANGE UP (ref 96–108)
CO2 SERPL-SCNC: 24 MMOL/L — SIGNIFICANT CHANGE UP (ref 22–31)
CREAT SERPL-MCNC: 1.27 MG/DL — SIGNIFICANT CHANGE UP (ref 0.5–1.3)
CULTURE RESULTS: NO GROWTH — SIGNIFICANT CHANGE UP
EOSINOPHIL # BLD AUTO: 0.18 K/UL — SIGNIFICANT CHANGE UP (ref 0–0.5)
EOSINOPHIL NFR BLD AUTO: 2.4 % — SIGNIFICANT CHANGE UP (ref 0–6)
FERRITIN SERPL-MCNC: 113 NG/ML — SIGNIFICANT CHANGE UP (ref 30–400)
FOLATE SERPL-MCNC: >20 NG/ML — SIGNIFICANT CHANGE UP
GLUCOSE SERPL-MCNC: 94 MG/DL — SIGNIFICANT CHANGE UP (ref 70–99)
HAPTOGLOB SERPL-MCNC: 219 MG/DL — HIGH (ref 34–200)
HCT VFR BLD CALC: 22.9 % — LOW (ref 39–50)
HGB BLD-MCNC: 7 G/DL — CRITICAL LOW (ref 13–17)
IMM GRANULOCYTES NFR BLD AUTO: 0.3 % — SIGNIFICANT CHANGE UP (ref 0–1.5)
IRON SATN MFR SERPL: 41 % — SIGNIFICANT CHANGE UP (ref 16–55)
IRON SATN MFR SERPL: 87 UG/DL — SIGNIFICANT CHANGE UP (ref 45–165)
LACTATE BLDV-MCNC: 3 MMOL/L — HIGH (ref 0.7–2)
LYMPHOCYTES # BLD AUTO: 2.87 K/UL — SIGNIFICANT CHANGE UP (ref 1–3.3)
LYMPHOCYTES # BLD AUTO: 38.7 % — SIGNIFICANT CHANGE UP (ref 13–44)
MCHC RBC-ENTMCNC: 30.6 GM/DL — LOW (ref 32–36)
MCHC RBC-ENTMCNC: 31.5 PG — SIGNIFICANT CHANGE UP (ref 27–34)
MCV RBC AUTO: 103.2 FL — HIGH (ref 80–100)
MONOCYTES # BLD AUTO: 0.9 K/UL — SIGNIFICANT CHANGE UP (ref 0–0.9)
MONOCYTES NFR BLD AUTO: 12.1 % — SIGNIFICANT CHANGE UP (ref 2–14)
NEUTROPHILS # BLD AUTO: 3.38 K/UL — SIGNIFICANT CHANGE UP (ref 1.8–7.4)
NEUTROPHILS NFR BLD AUTO: 45.7 % — SIGNIFICANT CHANGE UP (ref 43–77)
PLATELET # BLD AUTO: 130 K/UL — LOW (ref 150–400)
POTASSIUM SERPL-MCNC: 4 MMOL/L — SIGNIFICANT CHANGE UP (ref 3.5–5.3)
POTASSIUM SERPL-SCNC: 4 MMOL/L — SIGNIFICANT CHANGE UP (ref 3.5–5.3)
PROT SERPL-MCNC: 5.4 G/DL — LOW (ref 6–8.3)
RBC # BLD: 2.22 M/UL — LOW (ref 4.2–5.8)
RBC # FLD: 14.9 % — HIGH (ref 10.3–14.5)
SODIUM SERPL-SCNC: 144 MMOL/L — SIGNIFICANT CHANGE UP (ref 135–145)
SPECIMEN SOURCE: SIGNIFICANT CHANGE UP
TIBC SERPL-MCNC: 212 UG/DL — LOW (ref 220–430)
UIBC SERPL-MCNC: 125 UG/DL — SIGNIFICANT CHANGE UP (ref 110–370)
VIT B12 SERPL-MCNC: 702 PG/ML — SIGNIFICANT CHANGE UP (ref 232–1245)
WBC # BLD: 7.41 K/UL — SIGNIFICANT CHANGE UP (ref 3.8–10.5)
WBC # FLD AUTO: 7.41 K/UL — SIGNIFICANT CHANGE UP (ref 3.8–10.5)

## 2019-09-14 RX ORDER — CIPROFLOXACIN LACTATE 400MG/40ML
500 VIAL (ML) INTRAVENOUS EVERY 12 HOURS
Refills: 0 | Status: DISCONTINUED | OUTPATIENT
Start: 2019-09-14 | End: 2019-09-15

## 2019-09-14 RX ADMIN — RIVASTIGMINE 1 PATCH: 4.6 PATCH, EXTENDED RELEASE TRANSDERMAL at 21:30

## 2019-09-14 RX ADMIN — Medication 1 TABLET(S): at 12:12

## 2019-09-14 RX ADMIN — RIVASTIGMINE 1 PATCH: 4.6 PATCH, EXTENDED RELEASE TRANSDERMAL at 21:34

## 2019-09-14 RX ADMIN — ATORVASTATIN CALCIUM 10 MILLIGRAM(S): 80 TABLET, FILM COATED ORAL at 21:30

## 2019-09-14 RX ADMIN — Medication 500 MILLIGRAM(S): at 18:27

## 2019-09-14 RX ADMIN — RIVASTIGMINE 1 PATCH: 4.6 PATCH, EXTENDED RELEASE TRANSDERMAL at 18:27

## 2019-09-14 RX ADMIN — RIVASTIGMINE 1 PATCH: 4.6 PATCH, EXTENDED RELEASE TRANSDERMAL at 06:36

## 2019-09-14 NOTE — PROGRESS NOTE ADULT - SUBJECTIVE AND OBJECTIVE BOX
89 yo male with Hx of CAD s/p PCI x2 as reported, TIA on plavix, Atrial Fibrillation on no AC , HLD, HTN, Dementia , UTI was brought [facility] due to report of Ghada hematuria.  AS per daughter, pt has recurrennt UTI and was recently seen by Urologist who prescribed Monural for 3 doses , patient received 2 doses so far and started having the ghada hematuria.  Pt started having ghada hematuria started 2-3 days prior to admission  with no fever or reported abd pain.  As per daughter, patient was in hosp in March or 2019 at another hosp for UTI and was dc to [facility] and subsequently dc to home.  As per PMD daughter reported abd distension.  A CBI was placed in the ED with reported of about one Liter of urine with Blood and clots . Patient states he  is comfortable.     MEDICATIONS  (STANDING):  atorvastatin 10 milliGRAM(s) Oral at bedtime  calcium carbonate 1250 mG  + Vitamin D (OsCal 500 + D) 1 Tablet(s) Oral daily  influenza   Vaccine 0.5 milliLiter(s) IntraMuscular once  multivitamin 1 Tablet(s) Oral daily  rivastigmine patch  9.5 mG/24 Hr(s) 1 Patch Transdermal every 24 hours    MEDICATIONS  (PRN):  acetaminophen    Suspension .. 650 milliGRAM(s) Oral every 4 hours PRN Mild Pain (1 - 3)  ondansetron Injectable 4 milliGRAM(s) IV Push every 6 hours PRN Nausea          VITALS:   T(C): 36.2 (19 @ 12:24), Max: 36.9 (19 @ 04:14)  HR: 65 (19 @ 12:24) (62 - 67)  BP: 119/80 (19 @ 12:24) (100/59 - 130/61)  RR: 18 (19 @ 12:24) (12 - 18)  SpO2: 99% (19 @ 12:24) (97% - 100%)  Wt(kg): --    PHYSICAL EXAM:  GENERAL: NAD, well nourished and conversant  HEAD:  Atraumatic  EYES: EOM, PERRLA, conjunctiva pink and sclera white  ENT: No tonsillar erythema, exudates, or enlargement, moist mucous membranes, good dentition, no lesions  NECK: Supple, No JVD, normal thyroid, carotids with normal upstrokes and no bruits  CHEST/LUNG: Clear to auscultation bilaterally, No rales, rhonchi, wheezing, or rubs  HEART: Regular rate and rhythm, No murmurs, rubs, or gallops  ABDOMEN: Soft, nondistended, no masses, guarding, tenderness or rebound, bowel sounds present  EXTREMITIES:  2+ Peripheral Pulses, No clubbing, cyanosis, or edema. s/p re[aojf pf ;ef  LYMPH: No lymphadenopathy noted  SKIN: No rashes or lesions  NERVOUS SYSTEM:  Alert & Oriented X3, normal cognitive function. Motor Strength 5/5 right upper and right lower.  5/5 left upper and left lower extremities, DTRs 2+ intact and symmetric      LABS:        CBC Full  -  ( 14 Sep 2019 10:42 )  WBC Count : 7.41 K/uL  RBC Count : 2.22 M/uL  Hemoglobin : 7.0 g/dL  Hematocrit : 22.9 %  Platelet Count - Automated : 130 K/uL  Mean Cell Volume : 103.2 fl  Mean Cell Hemoglobin : 31.5 pg  Mean Cell Hemoglobin Concentration : 30.6 gm/dL  Auto Neutrophil # : 3.38 K/uL  Auto Lymphocyte # : 2.87 K/uL  Auto Monocyte # : 0.90 K/uL  Auto Eosinophil # : 0.18 K/uL  Auto Basophil # : 0.06 K/uL  Auto Neutrophil % : 45.7 %  Auto Lymphocyte % : 38.7 %  Auto Monocyte % : 12.1 %  Auto Eosinophil % : 2.4 %  Auto Basophil % : 0.8 %        144  |  108  |  26<H>  ----------------------------<  94  4.0   |  24  |  1.27    Ca    9.1      14 Sep 2019 06:31    TPro  5.4<L>  /  Alb  3.0<L>  /  TBili  0.3  /  DBili  x   /  AST  29  /  ALT  30  /  AlkPhos  55      LIVER FUNCTIONS - ( 14 Sep 2019 06:31 )  Alb: 3.0 g/dL / Pro: 5.4 g/dL / ALK PHOS: 55 U/L / ALT: 30 U/L / AST: 29 U/L / GGT: x           PT/INR - ( 13 Sep 2019 09:03 )   PT: 12.6 sec;   INR: 1.10 ratio         PTT - ( 13 Sep 2019 09:03 )  PTT:27.0 sec  Urinalysis Basic - ( 13 Sep 2019 14:59 )    Color: Light Yellow / Appearance: Slightly Turbid / S.012 / pH: x  Gluc: x / Ketone: Negative  / Bili: Negative / Urobili: Negative   Blood: x / Protein: 100 mg/dL / Nitrite: Negative   Leuk Esterase: Small / RBC: >50 /hpf / WBC 5 /HPF   Sq Epi: x / Non Sq Epi: 0 / Bacteria: Negative      CAPILLARY BLOOD GLUCOSE          RADIOLOGY & ADDITIONAL TESTS:

## 2019-09-14 NOTE — PROGRESS NOTE ADULT - ASSESSMENT
89 yo male with PMH significant for dementia, CAD s/p PCI x2, TIA on plavix, afibon no AC, HLD, HTN & recurrent UTIs, resident of independent living Facility   He was hospitalized at [facility] in July 2019 with acute confusion without sepsis & treated for ? UTI when a bladder sonogram had revealed retention of 370 ml of urine & an anterior bladder wall diverticulum   He followed up with Dr. Annie Soto in Sep & a urine cx from his office on 9/05/19 had recovered Proteus R to cephalosporins, quinolones, bactrim, tetracyclines & macrobid, intermediate to Gent & susceptible to amikacin & carbapenems which was resulted on 9/09/19. He was prescribed Monurol   Brought in to the ER on 9/13/19 for evaluation of ghada hematuria without fevers or leukocytosis  Patient denied symptoms aside from hematuria - & was started on CBI  UA revealed large blood with only 5 WBCs    PLAN:  Hematuria appears to have improved with CBI  But concur with Dr. Landis, in the absence of fevers, leukocytosis & symptoms of UTI - would monitor off of antibiotics  Given known bladder diverticulum - he is at risk for bladder colonization with microorganisms as well as UTI  Follow cx, symptoms & temp curve  Will follow thanks

## 2019-09-14 NOTE — CHART NOTE - NSCHARTNOTEFT_GEN_A_CORE
Order received for swallow evaluation. Chart reviewed and d/w RN Kenroy and NP Alissa. Per [facility] documentation, patient passed RN dysphagia screen in ED and would be appropriate for initiation of puree diet, advanced as tolerated without need for formal swallow evaluation. Per chart review, there does not appear to be an indication for formal swallow evaluation at this time. As d/w NP, this service will remain available for reconsult as appropriate.

## 2019-09-14 NOTE — PROGRESS NOTE ADULT - SUBJECTIVE AND OBJECTIVE BOX
CC: f/u for ? UTI     Patient reports that he is hungry but otherwise feels fine. Knows that he is in the hospital but not why     REVIEW OF SYSTEMS:  All other review of systems negative (Comprehensive ROS)    Antimicrobials Day #  : none     Other Medications Reviewed    T(F): 97.2 (19 @ 12:24), Max: 98.4 (19 @ 04:14)  HR: 65 (19 @ 12:24)  BP: 119/80 (19 @ 12:24)  RR: 18 (19 @ 12:24)  SpO2: 99% (19 @ 12:24)  Wt(kg): --    PHYSICAL EXAM:  General: alert, no acute distress  Eyes:  anicteric, no conjunctival injection, no discharge  Neck: supple, without adenopathy  Lungs: clear to auscultation  Heart: regular rate and rhythm; no murmurs  Abdomen: soft, nondistended, nontender. Bowel sounds +  : CBI in process with clear urine   Extremities: no clubbing, cyanosis, or edema  Neurologic: alert, oriented, moves all extremities    LAB RESULTS:                        7.0    7.41  )-----------( 130      ( 14 Sep 2019 10:42 )             22.9         144  |  108  |  26<H>  ----------------------------<  94  4.0   |  24  |  1.27    Ca    9.1      14 Sep 2019 06:31    TPro  5.4<L>  /  Alb  3.0<L>  /  TBili  0.3  /  DBili  x   /  AST  29  /  ALT  30  /  AlkPhos  55      LIVER FUNCTIONS - ( 14 Sep 2019 06:31 )  Alb: 3.0 g/dL / Pro: 5.4 g/dL / ALK PHOS: 55 U/L / ALT: 30 U/L / AST: 29 U/L / GGT: x           Urinalysis Basic - ( 13 Sep 2019 14:59 )    Color: Light Yellow / Appearance: Slightly Turbid / S.012 / pH: x  Gluc: x / Ketone: Negative  / Bili: Negative / Urobili: Negative   Blood: x / Protein: 100 mg/dL / Nitrite: Negative   Leuk Esterase: Small / RBC: >50 /hpf / WBC 5 /HPF   Sq Epi: x / Non Sq Epi: 0 / Bacteria: Negative      MICROBIOLOGY:  RECENT CULTURES:      RADIOLOGY REVIEWED:

## 2019-09-14 NOTE — PROGRESS NOTE ADULT - ASSESSMENT
89 yo male with Hx of CAD s/p PCI x2 on plavix, Atrial Fibrillation, HLD, HTN, Dementia , UTI was brought [facility] hospital [facility] due to report of [place]law hematuria s/p CBI with noted Macrocytic Anemia with significant drop in Hemoglobin.

## 2019-09-15 LAB
ANION GAP SERPL CALC-SCNC: 11 MMOL/L — SIGNIFICANT CHANGE UP (ref 5–17)
BUN SERPL-MCNC: 23 MG/DL — SIGNIFICANT CHANGE UP (ref 7–23)
CALCIUM SERPL-MCNC: 8.8 MG/DL — SIGNIFICANT CHANGE UP (ref 8.4–10.5)
CHLORIDE SERPL-SCNC: 103 MMOL/L — SIGNIFICANT CHANGE UP (ref 96–108)
CO2 SERPL-SCNC: 23 MMOL/L — SIGNIFICANT CHANGE UP (ref 22–31)
CREAT SERPL-MCNC: 1.13 MG/DL — SIGNIFICANT CHANGE UP (ref 0.5–1.3)
GLUCOSE BLDC GLUCOMTR-MCNC: 126 MG/DL — HIGH (ref 70–99)
GLUCOSE SERPL-MCNC: 104 MG/DL — HIGH (ref 70–99)
HCT VFR BLD CALC: 25.5 % — LOW (ref 39–50)
HGB BLD-MCNC: 8.6 G/DL — LOW (ref 13–17)
MCHC RBC-ENTMCNC: 32.2 PG — SIGNIFICANT CHANGE UP (ref 27–34)
MCHC RBC-ENTMCNC: 33.7 GM/DL — SIGNIFICANT CHANGE UP (ref 32–36)
MCV RBC AUTO: 95.5 FL — SIGNIFICANT CHANGE UP (ref 80–100)
PLATELET # BLD AUTO: 137 K/UL — LOW (ref 150–400)
POTASSIUM SERPL-MCNC: 3.9 MMOL/L — SIGNIFICANT CHANGE UP (ref 3.5–5.3)
POTASSIUM SERPL-SCNC: 3.9 MMOL/L — SIGNIFICANT CHANGE UP (ref 3.5–5.3)
RBC # BLD: 2.67 M/UL — LOW (ref 4.2–5.8)
RBC # FLD: 15.7 % — HIGH (ref 10.3–14.5)
SODIUM SERPL-SCNC: 137 MMOL/L — SIGNIFICANT CHANGE UP (ref 135–145)
WBC # BLD: 9.54 K/UL — SIGNIFICANT CHANGE UP (ref 3.8–10.5)
WBC # FLD AUTO: 9.54 K/UL — SIGNIFICANT CHANGE UP (ref 3.8–10.5)

## 2019-09-15 RX ADMIN — RIVASTIGMINE 1 PATCH: 4.6 PATCH, EXTENDED RELEASE TRANSDERMAL at 17:40

## 2019-09-15 RX ADMIN — RIVASTIGMINE 1 PATCH: 4.6 PATCH, EXTENDED RELEASE TRANSDERMAL at 23:07

## 2019-09-15 RX ADMIN — Medication 500 MILLIGRAM(S): at 05:25

## 2019-09-15 RX ADMIN — Medication 1 TABLET(S): at 12:31

## 2019-09-15 RX ADMIN — RIVASTIGMINE 1 PATCH: 4.6 PATCH, EXTENDED RELEASE TRANSDERMAL at 21:30

## 2019-09-15 RX ADMIN — ATORVASTATIN CALCIUM 10 MILLIGRAM(S): 80 TABLET, FILM COATED ORAL at 23:02

## 2019-09-15 RX ADMIN — RIVASTIGMINE 1 PATCH: 4.6 PATCH, EXTENDED RELEASE TRANSDERMAL at 06:47

## 2019-09-15 NOTE — PROGRESS NOTE ADULT - ASSESSMENT
89 yo male with PMH significant for dementia, CAD s/p PCI x2, TIA on plavix, afibon no AC, HLD, HTN & recurrent UTIs, resident of independent living Facility   He was hospitalized at [facility] in July 2019 with acute confusion without sepsis & treated for ? UTI when a bladder sonogram had revealed retention of 370 ml of urine & an anterior bladder wall diverticulum   He followed up with Dr. Annie Soto in Sep & a urine cx from his office on 9/05/19 had recovered Proteus R to cephalosporins, quinolones, bactrim, tetracyclines & macrobid, intermediate to Gent & susceptible to amikacin & carbapenems which was resulted on 9/09/19. He was prescribed Monurol   Brought in to the ER on 9/13/19 for evaluation of ghada hematuria without fevers or leukocytosis  Patient denied symptoms aside from hematuria - & was started on CBI  UA revealed large blood with only 5 WBCs  Urine cx without growth   Blood cx so far without growth to date  Hematuria appears to have improved with CBI - mgmt as per     PLAN:  No fevers, leukocytosis or symptoms of UTI & urine cx without growth - see no indication for empiric antibiotics  Follow blood cx, symptoms & temp curve

## 2019-09-15 NOTE — PROGRESS NOTE ADULT - SUBJECTIVE AND OBJECTIVE BOX
CC: f/u for ? UTI    Patient reports that he is in a hospital in [place] for  bleed     REVIEW OF SYSTEMS:  All other review of systems negative (Comprehensive ROS)    Antimicrobials Day #  :   ciprofloxacin     Tablet 500 milliGRAM(s) Oral every 12 hours    Other Medications Reviewed    T(F): 98.5 (09-15-19 @ 05:23), Max: 98.5 (09-15-19 @ 05:23)  HR: 76 (09-15-19 @ 05:23)  BP: 140/71 (09-15-19 @ 05:23)  RR: 18 (09-15-19 @ 05:23)  SpO2: 98% (09-15-19 @ 05:23)  Wt(kg): --    PHYSICAL EXAM:  General: alert, no acute distress  Eyes:  anicteric, no conjunctival injection, no discharge  Neck: supple, without adenopathy  Lungs: clear to auscultation  Heart: regular rate and rhythm; no murmurs  Abdomen: soft, nondistended, nontender. Bowel sounds +  : CBI in process with clear urine   Extremities: no clubbing, cyanosis, or edema  Neurologic: alert, oriented, moves all extremities    LAB RESULTS:                        8.6    9.54  )-----------( 137      ( 15 Sep 2019 09:39 )             25.5     09-15    137  |  103  |  23  ----------------------------<  104<H>  3.9   |  23  |  1.13    Ca    8.8      15 Sep 2019 07:31    TPro  5.4<L>  /  Alb  3.0<L>  /  TBili  0.3  /  DBili  x   /  AST  29  /  ALT  30  /  AlkPhos  55  09-14    LIVER FUNCTIONS - ( 14 Sep 2019 06:31 )  Alb: 3.0 g/dL / Pro: 5.4 g/dL / ALK PHOS: 55 U/L / ALT: 30 U/L / AST: 29 U/L / GGT: x           Urinalysis Basic - ( 13 Sep 2019 14:59 )    Color: Light Yellow / Appearance: Slightly Turbid / S.012 / pH: x  Gluc: x / Ketone: Negative  / Bili: Negative / Urobili: Negative   Blood: x / Protein: 100 mg/dL / Nitrite: Negative   Leuk Esterase: Small / RBC: >50 /hpf / WBC 5 /HPF   Sq Epi: x / Non Sq Epi: 0 / Bacteria: Negative      MICROBIOLOGY:  RECENT CULTURES:   @ 10:07 .Blood     No growth to date.       @ 22:59 .Blood     No growth to date.       @ 18:11 .Urine     No growth        RADIOLOGY REVIEWED:

## 2019-09-15 NOTE — CONSULT NOTE ADULT - SUBJECTIVE AND OBJECTIVE BOX
"HPI: 89 yo male with Hx of CAD s/p PCI x2 as reported, TIA on plavix, Atrial Fibrillation on no AC , HLD, HTN, Dementia , UTI was brought in the hospital from Independent  Living Facility due to report of Ghada hematuria.  AS per daughter, pt has recurrennt UTI and was recently seen by Urologist who prescribed Monural for 3 doses.  Pt started having ghada hematuria started 2-3 days ago with no fever or reported abd pain.  As per PMD daughter reported abd distension.  A CBI was placed in the ED with reported of about one Liter of urine with Blood and clots .      in the ED   :  Pt received 2 L of IVF boluses and Ceftriaxone 1 Gm. (13 Sep 2019 12:59)"    Above reviewed. Saw/spoke to patient. Patient presenting from independent living facility. Patient noticed having hematuria. Has a prior history of multiple UTIs in the past. On this episode patient had episode hematuria over past 3 days. Patient sent to ED for further eval. He states no fevers, no chills, no N/V/D. No Dysuria, no pyuria. No cough/sob. No other new complaints. ID called for further eval ? UTI in setting hematuria.    PAST MEDICAL & SURGICAL HISTORY:  Atrial fibrillation  UTI (urinary tract infection): MDR E.coli  Back pain  Fall  Dementia  Hypercholesteremia  HTN - Hypertension  CAD (Coronary Artery Disease)  S/P percutaneous transluminal coronary angioplasty: PCI: stent x2  h/o Hernia Repair    Allergies    No Known Allergies    Intolerances    morphine (Unknown)    ANTIMICROBIALS:  Ceftriaxone x 1 dose    OTHER MEDS:  acetaminophen    Suspension .. 650 milliGRAM(s) Oral every 4 hours PRN  atorvastatin 10 milliGRAM(s) Oral at bedtime  calcium carbonate 1250 mG  + Vitamin D (OsCal 500 + D) 1 Tablet(s) Oral daily  multivitamin 1 Tablet(s) Oral daily  omega-3-Acid Ethyl Esters 2 Gram(s) Oral two times a day  ondansetron Injectable 4 milliGRAM(s) IV Push every 6 hours PRN  rivastigmine patch  9.5 mG/24 Hr(s) 1 Patch Transdermal every 24 hours    SOCIAL HISTORY: No tobacco, no alcohol, no illicit drugs    FAMILY HISTORY:  No pertinent family history in first degree relatives relating to chief complaint, hematuria    Drug Dosing Weight  Height (cm): 182.88 (10 May 2018 14:54)  Weight (kg): 83 (10 May 2018 14:54)  BMI (kg/m2): 24.8 (10 May 2018 14:54)  BSA (m2): 2.05 (10 May 2018 14:54)    PE:    Vital Signs Last 24 Hrs  T(C): 36.4 (13 Sep 2019 14:13), Max: 36.9 (13 Sep 2019 08:18)  T(F): 97.5 (13 Sep 2019 14:13), Max: 98.5 (13 Sep 2019 08:18)  HR: 60 (13 Sep 2019 14:40) (60 - 100)  BP: 109/66 (13 Sep 2019 14:40) (100/57 - 130/59)  RR: 14 (13 Sep 2019 14:40) (14 - 20)  SpO2: 100% (13 Sep 2019 14:40) (96% - 100%)    Gen: AOx3, NAD, non-toxic, pleasant, fatigued  CV: S1+S2 normal, nontachycardic  Resp: Clear bilat, no resp distress, no crackles/wheezes  Abd: Soft, nontender, +BS  Ext: No LE edema, no wounds  : CBI in place, osorio  IV/Skin: No thrombophlebitis, no rash, pale  Msk: No low back pain, no arthralgias, no joint swelling  Neuro: No sensory deficits, no motor deficits    LABS:                        8.1    7.7   )-----------( 146      ( 13 Sep 2019 13:01 )             24.1     09-13    143  |  106  |  30<H>  ----------------------------<  126<H>  4.4   |  24  |  1.63<H>    Ca    9.4      13 Sep 2019 09:03    TPro  6.5  /  Alb  3.7  /  TBili  0.3  /  DBili  x   /  AST  32  /  ALT  34  /  AlkPhos  69  09-13    MICROBIOLOGY:    No new available    RADIOLOGY:    No new available
[facility]  Gastroenterology Progress Note    Jossie Scott Patient Status:  Inpatient    1939 MRN [id number]   Location [facility] 3W/SW Attending Laurel Horvath MD   Hosp Day # 5 PCP Christophe No MD     Subjective:  Vena Cassette
HPI:  Patient is a 89y Male who presented with gross hematuria from assisted living.  pt with dementia. unable to provide reliable history      PAST MEDICAL & SURGICAL HISTORY:  Atrial fibrillation  UTI (urinary tract infection): MDR E.coli  Back pain  Fall  Dementia  Hypercholesteremia  HTN - Hypertension  CAD (Coronary Artery Disease)  S/P percutaneous transluminal coronary angioplasty: PCI: stent x2  h/o Hernia Repair    FAMILY HISTORY:  No pertinent family history in first degree relatives    SOCIAL HISTORY:   Tobacco hx:    MEDICATIONS  (STANDING):    MEDICATIONS  (PRN):    Allergies    No Known Allergies    Intolerances    morphine (Unknown)      REVIEW OF SYSTEMS: Pertinent positives and negatives as stated in HPI, otherwise negative    Vital signs  T(C): 36.6 (09-13-19 @ 11:34), Max: 36.9 (09-13-19 @ 08:18)  HR: 63 (09-13-19 @ 11:34)  BP: 117/60 (09-13-19 @ 11:34)  SpO2: 100% (09-13-19 @ 11:34)  Wt(kg): --    Output    UOP    Physical Exam  Gen: NAD  Pulm: No intercostal retractions  Back: No CVAT b/l   Abd: Soft, NT, ND  : 22F 3 way osorio in place, +CBI, bladder irrigated with NS, few clots evacuated. urine pink to clear    LABS:      09-13 @ 10:32    WBC 9.3   / Hct 25.9  / SCr --       09-13 @ 09:03    WBC 7.2   / Hct 29.9  / SCr 1.63     09-13    143  |  106  |  30<H>  ----------------------------<  126<H>  4.4   |  24  |  1.63<H>    Ca    9.4      13 Sep 2019 09:03    TPro  6.5  /  Alb  3.7  /  TBili  0.3  /  DBili  x   /  AST  32  /  ALT  34  /  AlkPhos  69  09-13    PT/INR - ( 13 Sep 2019 09:03 )   PT: 12.6 sec;   INR: 1.10 ratio         PTT - ( 13 Sep 2019 09:03 )  PTT:27.0 sec
Urology Consult Note    Chief Complaint:  gross hematuria, UTI, , retention    History of Present Illness:  89yM with gross hematuria and UTI. Known to me. Have seen him in office. Recently seen in office for UTI and gross hematuria. Pt had a pan-resistant UTI organism and was started on monurol. He had an appt to see ID but did not have the outpt visit yet. Son repeatedly spoke with me over the past week regarding pt having malaise, weakness, and refractory hematuria. I repeatedly recommended he go to hospital but son did not follow recommendations and kept him at home. Son stated that 1 day ago pt was very weak and bleeding was worsening and then brought him to the ER. Pt has dementia. Mai placed. CBI started. Urine is yellow currently.  Fib (on Plavix), Dementia, HTN, HLD, BIBEMS from [facility] w/ ghada hematuria. states pt was recently placed on Monurol for UTI. today was noted to have ghada hematuria. unaware of previous hx of similar sx. Family members also noted patient having abdominal distension--retention.    PAST MEDICAL & SURGICAL HISTORY:  Atrial fibrillation  UTI (urinary tract infection): MDR E.coli  Back pain  Fall  Dementia  Hypercholesteremia  HTN - Hypertension  CAD (Coronary Artery Disease)  S/P percutaneous transluminal coronary angioplasty: PCI: stent x2  h/o Hernia Repair      FAMILY HISTORY:  No pertinent family history in first degree relatives      Allergies    No Known Allergies    Intolerances    morphine (Unknown)      Social History:  Denies tobacco or alcohol use    Review of Systems:   Constitutional: No weight loss, no weakness  HEENT: No visual loss, no hearing loss, no sneezing  Skin: No rash or itching  CV: No chest pain, no chest pressure  Pulm: No shortness of breath, cough, or sputum  GI: No melena, no constipation  : Per HPI  Neuro: No headache, dizziness  MSK: No muscle pain, no joint pain  Heme: No anemia, bruising, or bleeding  Lymphatics: No enlarged nodes, no history of splenectomy  Psych: No depression of anxiety  Endo: No cold or heat intolerance  Allergies: No asthma, hives    Physical Exam:  Vital signs  T(C): 36.9 (09-15-19 @ 05:23), Max: 36.9 (09-15-19 @ 05:23)  HR: 76 (09-15-19 @ 05:23)  BP: 140/71 (09-15-19 @ 05:23)  SpO2: 98% (09-15-19 @ 05:23)  Wt(kg): --    Gen: No acute distress. Normal mood  HEENT: Normocephalic, neck supple  CV: Hemodynamically stable  Pulm: No increased work of breathing  Abd: soft, non-tender, non-distended  Back: No CVA tenderness, no midline pain  Extremities: No significant deformity or joint abnormality  Neuro: Alert & oriented x3, No focal deficits  Skin: Skin normal color, normal texture  Psych: Normal affect, normal behavior  : Nonpalpable bladder      Labs:      09-15 @ 09:39    WBC 9.54  / Hct 25.5  / SCr --       09-15 @ 07:31    WBC --    / Hct --    / SCr 1.13     09-15    137  |  103  |  23  ----------------------------<  104<H>  3.9   |  23  |  1.13    Ca    8.8      15 Sep 2019 07:31    TPro  5.4<L>  /  Alb  3.0<L>  /  TBili  0.3  /  DBili  x   /  AST  29  /  ALT  30  /  AlkPhos  55        Urinalysis Basic - ( 13 Sep 2019 14:59 )    Color: Light Yellow / Appearance: Slightly Turbid / S.012 / pH: x  Gluc: x / Ketone: Negative  / Bili: Negative / Urobili: Negative   Blood: x / Protein: 100 mg/dL / Nitrite: Negative   Leuk Esterase: Small / RBC: >50 /hpf / WBC 5 /HPF   Sq Epi: x / Non Sq Epi: 0 / Bacteria: Negative    US:    IMPRESSION:     No hydronephrosis.

## 2019-09-15 NOTE — CONSULT NOTE ADULT - ASSESSMENT
89yM with UTI, gross hematuria, retention    -Maintain osorio catheter  ***Stat-lock adhesive appears to have fallen off--please apply new stat-lock to prevent patient from pulling on osorio catheter causing a traumatic urethral injury***  -F/u cultures  -ABx per ID  -Hydration  -Continue CBI--titrate to light pink/clear  -Bowel regimen  -Please order CT a/p non-contrast to assess etiology of his gross hematuria. Given Cr, unable to have CT urogram which is best imaging modality for gross hematuria evaluation
Gross Hematuria      Rec:  -cont CBI overnight  -will try to wean CBI in am  -urine culture    discussed with urology attending G. Goldberg MD
89 yo male with Hx of CAD s/p PCI x2 as reported, TIA on plavix, Atrial Fibrillation on no AC , HLD, HTN, Dementia , UTI was brought in the hospital from Independent  Living Facility due to report of Stanislaw hematuria.  No fever, no leukocytosis  No dysuria, no pyuria; patient denies symptoms aside from hematuria  Recent treatment with monurol  Presently no signs UTI, can monitor off abx  Overall, hematuria, dementia, prior UTIs  - Continue off abx for now  - Monitor for symptoms UTI  - Check UA, UCX  - Trend CBC  - F/U   - Low threshold to start Ertapenem if signs UTI    Checo Landis MD  Pager [phone]  After 5pm and on weekends call [phone]

## 2019-09-15 NOTE — PROGRESS NOTE ADULT - SUBJECTIVE AND OBJECTIVE BOX
89 yo male with Hx of CAD s/p PCI x2 as reported, TIA on plavix, Atrial Fibrillation on no AC , HLD, HTN, Dementia , UTI was brought in the hospital from Independent  Living Facility due to report of Ghada hematuria.  AS per daughter, pt has recurrennt UTI and was recently seen by Urologist who prescribed Monural for 3 doses , patient received 2 doses so far and started having the ghada hematuria.  Pt started having ghada hematuria started 2-3 days prior to admission  with no fever or reported abd pain.  As per daughter, patient was in hosp in March or April 2019 at another hosp for UTI and was dc to [facility] and subsequently dc to home.  As per PMD daughter reported abd distension.  A CBI was placed in the ED with reported of about one Liter of urine with Blood and clots .Pt somewhat tearful and more confused today        MEDICATIONS  (STANDING):  atorvastatin 10 milliGRAM(s) Oral at bedtime  calcium carbonate 1250 mG  + Vitamin D (OsCal 500 + D) 1 Tablet(s) Oral daily  influenza   Vaccine 0.5 milliLiter(s) IntraMuscular once  multivitamin 1 Tablet(s) Oral daily  rivastigmine patch  9.5 mG/24 Hr(s) 1 Patch Transdermal every 24 hours    MEDICATIONS  (PRN):  acetaminophen    Suspension .. 650 milliGRAM(s) Oral every 4 hours PRN Mild Pain (1 - 3)  ondansetron Injectable 4 milliGRAM(s) IV Push every 6 hours PRN Nausea          VITALS:   T(C): 37.3 (09-15-19 @ 20:54), Max: 38 (09-15-19 @ 20:04)  HR: 87 (09-15-19 @ 20:04) (73 - 87)  BP: 169/97 (09-15-19 @ 20:04) (140/71 - 169/97)  RR: 18 (09-15-19 @ 20:04) (17 - 18)  SpO2: 98% (09-15-19 @ 20:04) (98% - 99%)  Wt(kg): --    PHYSICAL EXAM:  GENERAL: NAD, well nourished and conversant  HEAD:  Atraumatic  EYES: EOM, PERRLA, conjunctiva pink and sclera white  ENT: No tonsillar erythema, exudates, or enlargement, moist mucous membranes, good dentition, no lesions  NECK: Supple, No JVD, normal thyroid, carotids with normal upstrokes and no bruits  CHEST/LUNG: Clear to auscultation bilaterally, No rales, rhonchi, wheezing, or rubs  HEART: Regular rate and rhythm, No murmurs, rubs, or gallops  ABDOMEN: Soft, nondistended, no masses, guarding, tenderness or rebound, bowel sounds present  EXTREMITIES:  2+ Peripheral Pulses, No clubbing, cyanosis, or edema. s/p re[aojf pf ;ef  LYMPH: No lymphadenopathy noted  SKIN: No rashes or lesions  NERVOUS SYSTEM:  Alert & Oriented X3, normal cognitive function. Motor Strength 5/5 right upper and right lower.  5/5 left upper and left lower extremities, DTRs 2+ intact and symmetric      LABS:        CBC Full  -  ( 15 Sep 2019 09:39 )  WBC Count : 9.54 K/uL  RBC Count : 2.67 M/uL  Hemoglobin : 8.6 g/dL  Hematocrit : 25.5 %  Platelet Count - Automated : 137 K/uL  Mean Cell Volume : 95.5 fl  Mean Cell Hemoglobin : 32.2 pg  Mean Cell Hemoglobin Concentration : 33.7 gm/dL  Auto Neutrophil # : x  Auto Lymphocyte # : x  Auto Monocyte # : x  Auto Eosinophil # : x  Auto Basophil # : x  Auto Neutrophil % : x  Auto Lymphocyte % : x  Auto Monocyte % : x  Auto Eosinophil % : x  Auto Basophil % : x    09-15    137  |  103  |  23  ----------------------------<  104<H>  3.9   |  23  |  1.13    Ca    8.8      15 Sep 2019 07:31    TPro  5.4<L>  /  Alb  3.0<L>  /  TBili  0.3  /  DBili  x   /  AST  29  /  ALT  30  /  AlkPhos  55  09-14    LIVER FUNCTIONS - ( 14 Sep 2019 06:31 )  Alb: 3.0 g/dL / Pro: 5.4 g/dL / ALK PHOS: 55 U/L / ALT: 30 U/L / AST: 29 U/L / GGT: x               CAPILLARY BLOOD GLUCOSE      POCT Blood Glucose.: 126 mg/dL (15 Sep 2019 21:39)      RADIOLOGY & ADDITIONAL TESTS:

## 2019-09-16 ENCOUNTER — TRANSCRIPTION ENCOUNTER (OUTPATIENT)
Age: 84
End: 2019-09-16

## 2019-09-16 LAB
HCT VFR BLD CALC: 25 % — LOW (ref 39–50)
HGB BLD-MCNC: 8.3 G/DL — LOW (ref 13–17)
MCHC RBC-ENTMCNC: 32.3 PG — SIGNIFICANT CHANGE UP (ref 27–34)
MCHC RBC-ENTMCNC: 33.2 GM/DL — SIGNIFICANT CHANGE UP (ref 32–36)
MCV RBC AUTO: 97.3 FL — SIGNIFICANT CHANGE UP (ref 80–100)
PLATELET # BLD AUTO: 133 K/UL — LOW (ref 150–400)
RBC # BLD: 2.57 M/UL — LOW (ref 4.2–5.8)
RBC # FLD: 15.9 % — HIGH (ref 10.3–14.5)
WBC # BLD: 8.65 K/UL — SIGNIFICANT CHANGE UP (ref 3.8–10.5)
WBC # FLD AUTO: 8.65 K/UL — SIGNIFICANT CHANGE UP (ref 3.8–10.5)

## 2019-09-16 RX ADMIN — RIVASTIGMINE 1 PATCH: 4.6 PATCH, EXTENDED RELEASE TRANSDERMAL at 23:46

## 2019-09-16 RX ADMIN — ATORVASTATIN CALCIUM 10 MILLIGRAM(S): 80 TABLET, FILM COATED ORAL at 23:49

## 2019-09-16 RX ADMIN — RIVASTIGMINE 1 PATCH: 4.6 PATCH, EXTENDED RELEASE TRANSDERMAL at 23:48

## 2019-09-16 RX ADMIN — RIVASTIGMINE 1 PATCH: 4.6 PATCH, EXTENDED RELEASE TRANSDERMAL at 17:21

## 2019-09-16 RX ADMIN — Medication 1 TABLET(S): at 13:24

## 2019-09-16 RX ADMIN — RIVASTIGMINE 1 PATCH: 4.6 PATCH, EXTENDED RELEASE TRANSDERMAL at 07:46

## 2019-09-16 NOTE — PROGRESS NOTE ADULT - SUBJECTIVE AND OBJECTIVE BOX
CC: f/u for possible UTI    Patient reports: CBI in progress, urine clear    REVIEW OF SYSTEMS:  All other review of systems negative (Comprehensive ROS)    Antimicrobials Day #  :off    Other Medications Reviewed    T(F): 97.3 (09-16-19 @ 14:44), Max: 100.4 (09-15-19 @ 20:04)  HR: 97 (09-16-19 @ 14:44)  BP: 105/67 (09-16-19 @ 14:44)  RR: 18 (09-16-19 @ 14:44)  SpO2: 98% (09-16-19 @ 14:44)  Wt(kg): --    PHYSICAL EXAM:  General: alert, no acute distress  Eyes:  anicteric, no conjunctival injection, no discharge  Oropharynx: no lesions or injection 	  Neck: supple, without adenopathy  Lungs: clear to auscultation  Heart: irregular rate and rhythm; no murmur, rubs or gallops  Abdomen: soft, nondistended, nontender, without mass or organomegaly  Skin: no lesions  Extremities: no clubbing, cyanosis, or edema  Neurologic: alert, oriented, moves all extremities    LAB RESULTS:                        8.3    8.65  )-----------( 133      ( 16 Sep 2019 09:48 )             25.0     09-15    137  |  103  |  23  ----------------------------<  104<H>  3.9   |  23  |  1.13    Ca    8.8      15 Sep 2019 07:31          MICROBIOLOGY:  RECENT CULTURES:  09-14 @ 10:07 .Blood     No growth to date.      09-13 @ 22:59 .Blood     No growth to date.      09-13 @ 18:11 .Urine     No growth          RADIOLOGY REVIEWED:    < from: US Kidney and Bladder (09.13.19 @ 16:15) >  IMPRESSION:     No hydronephrosis.    < end of copied text >

## 2019-09-16 NOTE — DISCHARGE NOTE NURSING/CASE MANAGEMENT/SOCIAL WORK - NSDCPEWEB_GEN_ALL_CORE
[place] website --- [email or web address] Tobacco [email or web address] website --- [email or web address]

## 2019-09-16 NOTE — PROGRESS NOTE ADULT - SUBJECTIVE AND OBJECTIVE BOX
89 yo male with Hx of CAD s/p PCI x2 as reported, TIA on plavix, Atrial Fibrillation on no AC , HLD, HTN, Dementia , UTI was brought in the hospital from [facility]  Living Facility due to report of Ghada hematuria.  AS per daughter, pt has recurrennt UTI and was recently seen by Urologist who prescribed Monural for 3 doses , patient received 2 doses so far and started having the ghada hematuria.  Pt started having ghada hematuria started 2-3 days prior to admission  with no fever or reported abd pain.  As per daughter, patient was in hosp in March or April 2019 at another hosp for UTI and was dc to [facility] and subsequently dc to home.  As per PMD daughter reported abd distension.  A CBI was placed in the ED with reported of about one Liter of urine with Blood and clots . Patient states he  is comfortable. Patient less confused today.     MEDICATIONS  (STANDING):  atorvastatin 10 milliGRAM(s) Oral at bedtime  calcium carbonate 1250 mG  + Vitamin D (OsCal 500 + D) 1 Tablet(s) Oral daily  influenza   Vaccine 0.5 milliLiter(s) IntraMuscular once  multivitamin 1 Tablet(s) Oral daily  rivastigmine patch  9.5 mG/24 Hr(s) 1 Patch Transdermal every 24 hours    MEDICATIONS  (PRN):  acetaminophen    Suspension .. 650 milliGRAM(s) Oral every 4 hours PRN Mild Pain (1 - 3)  ondansetron Injectable 4 milliGRAM(s) IV Push every 6 hours PRN Nausea          VITALS:   T(C): 36.3 (09-16-19 @ 14:44), Max: 38 (09-15-19 @ 20:04)  HR: 97 (09-16-19 @ 14:44) (79 - 97)  BP: 105/67 (09-16-19 @ 14:44) (105/67 - 169/97)  RR: 18 (09-16-19 @ 14:44) (18 - 18)  SpO2: 98% (09-16-19 @ 14:44) (96% - 98%)  Wt(kg): --    PHYSICAL EXAM:  GENERAL: NAD, well nourished and conversant  HEAD:  Atraumatic  EYES: EOM, PERRLA, conjunctiva pink and sclera white  ENT: No tonsillar erythema, exudates, or enlargement, moist mucous membranes, good dentition, no lesions  NECK: Supple, No JVD, normal thyroid, carotids with normal upstrokes and no bruits  CHEST/LUNG: Clear to auscultation bilaterally, No rales, rhonchi, wheezing, or rubs  HEART: Regular rate and rhythm, No murmurs, rubs, or gallops  ABDOMEN: Soft, nondistended, no masses, guarding, tenderness or rebound, bowel sounds present  EXTREMITIES:  2+ Peripheral Pulses, No clubbing, cyanosis, or edema. s/p re[aojf pf ;ef  LYMPH: No lymphadenopathy noted  SKIN: No rashes or lesions  NERVOUS SYSTEM:  Alert & Oriented . Motor Strength 5/5 right upper and right lower.  5/5 left upper and left lower extremities, DTRs 2+ intact and symmetric    LABS:        CBC Full  -  ( 16 Sep 2019 09:48 )  WBC Count : 8.65 K/uL  RBC Count : 2.57 M/uL  Hemoglobin : 8.3 g/dL  Hematocrit : 25.0 %  Platelet Count - Automated : 133 K/uL  Mean Cell Volume : 97.3 fl  Mean Cell Hemoglobin : 32.3 pg  Mean Cell Hemoglobin Concentration : 33.2 gm/dL  Auto Neutrophil # : x  Auto Lymphocyte # : x  Auto Monocyte # : x  Auto Eosinophil # : x  Auto Basophil # : x  Auto Neutrophil % : x  Auto Lymphocyte % : x  Auto Monocyte % : x  Auto Eosinophil % : x  Auto Basophil % : x    09-15    137  |  103  |  23  ----------------------------<  104<H>  3.9   |  23  |  1.13    Ca    8.8      15 Sep 2019 07:31            CAPILLARY BLOOD GLUCOSE      POCT Blood Glucose.: 126 mg/dL (15 Sep 2019 21:39)      RADIOLOGY & ADDITIONAL TESTS:

## 2019-09-16 NOTE — PROGRESS NOTE ADULT - ASSESSMENT
89 yo male with Hx of CAD s/p PCI x2 as reported, TIA on plavix, Atrial Fibrillation on no AC , HLD, HTN, Dementia , UTI was brought in the hospital from [facility]  Living Facility due to report of Stanislaw hematuria.  No fever, no leukocytosis  No dysuria, no pyuria; patient denies symptoms aside from hematuria  Recent treatment with monurol  Presently no signs UTI, can monitor off abx  Overall, hematuria, dementia, prior UTIs  - Continue off abx for now  - Monitor for symptoms UTI  - Check UA, UCX  - Trend CBC  - F/U   - Low threshold to start Ertapenem if signs UTI

## 2019-09-16 NOTE — PROGRESS NOTE ADULT - ASSESSMENT
89 yo male with Hx of CAD s/p PCI x2 on plavix, Atrial Fibrillation, HLD, HTN, Dementia , UTI was brought in the hospital from Independent  Living Facility due to report of Stanislaw hematuria s/p CBI with noted Macrocytic Anemia with significant drop in Hemoglobin.  Patient with an episode of confusion which has improved after stopping cipro

## 2019-09-16 NOTE — DISCHARGE NOTE NURSING/CASE MANAGEMENT/SOCIAL WORK - NSDCPEPTSTRK_GEN_ALL_CORE
Call 911 for stroke/Need for follow up after discharge/Stroke education booklet/Stroke warning signs and symptoms/Signs and symptoms of stroke/Prescribed medications/Risk factors for stroke/Stroke support groups for patients, families, and friends

## 2019-09-16 NOTE — DISCHARGE NOTE NURSING/CASE MANAGEMENT/SOCIAL WORK - NSDCPEEMAIL_GEN_ALL_CORE
[facility] for Tobacco Control email [email or web address]

## 2019-09-16 NOTE — DISCHARGE NOTE NURSING/CASE MANAGEMENT/SOCIAL WORK - PATIENT PORTAL LINK FT
You can access the FollowMyHealth Patient Portal offered by [facility] by registering at the following website: [email or web address]. By joining [employer]’s FollowMyHealth portal, you will also be able to view your health information using other applications (apps) compatible with our system.

## 2019-09-17 LAB
ANION GAP SERPL CALC-SCNC: 11 MMOL/L — SIGNIFICANT CHANGE UP (ref 5–17)
BUN SERPL-MCNC: 22 MG/DL — SIGNIFICANT CHANGE UP (ref 7–23)
CALCIUM SERPL-MCNC: 9 MG/DL — SIGNIFICANT CHANGE UP (ref 8.4–10.5)
CHLORIDE SERPL-SCNC: 105 MMOL/L — SIGNIFICANT CHANGE UP (ref 96–108)
CO2 SERPL-SCNC: 24 MMOL/L — SIGNIFICANT CHANGE UP (ref 22–31)
CREAT SERPL-MCNC: 1.26 MG/DL — SIGNIFICANT CHANGE UP (ref 0.5–1.3)
GLUCOSE SERPL-MCNC: 102 MG/DL — HIGH (ref 70–99)
HCT VFR BLD CALC: 28.1 % — LOW (ref 39–50)
HGB BLD-MCNC: 8.8 G/DL — LOW (ref 13–17)
MCHC RBC-ENTMCNC: 31.3 GM/DL — LOW (ref 32–36)
MCHC RBC-ENTMCNC: 31.9 PG — SIGNIFICANT CHANGE UP (ref 27–34)
MCV RBC AUTO: 101.8 FL — HIGH (ref 80–100)
PLATELET # BLD AUTO: 150 K/UL — SIGNIFICANT CHANGE UP (ref 150–400)
POTASSIUM SERPL-MCNC: 4.2 MMOL/L — SIGNIFICANT CHANGE UP (ref 3.5–5.3)
POTASSIUM SERPL-SCNC: 4.2 MMOL/L — SIGNIFICANT CHANGE UP (ref 3.5–5.3)
RBC # BLD: 2.76 M/UL — LOW (ref 4.2–5.8)
RBC # FLD: 16.2 % — HIGH (ref 10.3–14.5)
SODIUM SERPL-SCNC: 140 MMOL/L — SIGNIFICANT CHANGE UP (ref 135–145)
WBC # BLD: 8.33 K/UL — SIGNIFICANT CHANGE UP (ref 3.8–10.5)
WBC # FLD AUTO: 8.33 K/UL — SIGNIFICANT CHANGE UP (ref 3.8–10.5)

## 2019-09-17 PROCEDURE — 73110 X-RAY EXAM OF WRIST: CPT | Mod: 26,LT

## 2019-09-17 PROCEDURE — 74177 CT ABD & PELVIS W/CONTRAST: CPT | Mod: 26

## 2019-09-17 RX ORDER — SENNA PLUS 8.6 MG/1
2 TABLET ORAL AT BEDTIME
Refills: 0 | Status: DISCONTINUED | OUTPATIENT
Start: 2019-09-17 | End: 2019-09-24

## 2019-09-17 RX ORDER — DOCUSATE SODIUM 100 MG
100 CAPSULE ORAL THREE TIMES A DAY
Refills: 0 | Status: DISCONTINUED | OUTPATIENT
Start: 2019-09-17 | End: 2019-09-24

## 2019-09-17 RX ORDER — POLYETHYLENE GLYCOL 3350 17 G/17G
17 POWDER, FOR SOLUTION ORAL AT BEDTIME
Refills: 0 | Status: DISCONTINUED | OUTPATIENT
Start: 2019-09-17 | End: 2019-09-24

## 2019-09-17 RX ADMIN — RIVASTIGMINE 1 PATCH: 4.6 PATCH, EXTENDED RELEASE TRANSDERMAL at 18:53

## 2019-09-17 RX ADMIN — Medication 1 TABLET(S): at 13:13

## 2019-09-17 RX ADMIN — ATORVASTATIN CALCIUM 10 MILLIGRAM(S): 80 TABLET, FILM COATED ORAL at 22:36

## 2019-09-17 RX ADMIN — SENNA PLUS 2 TABLET(S): 8.6 TABLET ORAL at 22:41

## 2019-09-17 RX ADMIN — RIVASTIGMINE 1 PATCH: 4.6 PATCH, EXTENDED RELEASE TRANSDERMAL at 07:40

## 2019-09-17 RX ADMIN — RIVASTIGMINE 1 PATCH: 4.6 PATCH, EXTENDED RELEASE TRANSDERMAL at 22:57

## 2019-09-17 RX ADMIN — Medication 100 MILLIGRAM(S): at 22:36

## 2019-09-17 RX ADMIN — RIVASTIGMINE 1 PATCH: 4.6 PATCH, EXTENDED RELEASE TRANSDERMAL at 22:36

## 2019-09-17 NOTE — PROGRESS NOTE ADULT - SUBJECTIVE AND OBJECTIVE BOX
89 yo male with Hx of CAD s/p PCI x2 as reported, TIA on plavix, Atrial Fibrillation on no AC , HLD, HTN, Dementia , UTI was brought in the [facility] due to report of Ghada hematuria.  AS per daughter, pt has recurrennt UTI and was recently seen by Urologist who prescribed Monural for 3 doses , patient received 2 doses so far and started having the ghada hematuria.  Pt started having ghada hematuria started 2-3 days prior to admission  with no fever or reported abd pain.  As per daughter, patient was in hosp in March or April 2019 at another hosp for UTI and was dc to [facility] and subsequently dc to home.  As per PMD daughter reported abd distension.  A CBI was placed in the ED with reported of about one Liter of urine with Blood and clots . Patient states he  is comfortable. Patient less confused today.     MEDICATIONS  (STANDING):  atorvastatin 10 milliGRAM(s) Oral at bedtime  calcium carbonate 1250 mG  + Vitamin D (OsCal 500 + D) 1 Tablet(s) Oral daily  influenza   Vaccine 0.5 milliLiter(s) IntraMuscular once  multivitamin 1 Tablet(s) Oral daily  rivastigmine patch  9.5 mG/24 Hr(s) 1 Patch Transdermal every 24 hours    MEDICATIONS  (PRN):  acetaminophen    Suspension .. 650 milliGRAM(s) Oral every 4 hours PRN Mild Pain (1 - 3)  ondansetron Injectable 4 milliGRAM(s) IV Push every 6 hours PRN Nausea            Vital Signs Last 24 Hrs  T(C): 36.6 (17 Sep 2019 04:07), Max: 36.7 (16 Sep 2019 19:56)  T(F): 97.9 (17 Sep 2019 04:07), Max: 98.1 (16 Sep 2019 19:56)  HR: 77 (17 Sep 2019 04:07) (73 - 97)  BP: 131/79 (17 Sep 2019 04:07) (98/64 - 131/79)  BP(mean): --  RR: 18 (17 Sep 2019 04:07) (18 - 18)  SpO2: 96% (17 Sep 2019 04:07) (96% - 98%)    PHYSICAL EXAM:  GENERAL: NAD, well nourished and conversant  HEAD:  Atraumatic  EYES: EOM, PERRLA, conjunctiva pink and sclera white  ENT: No tonsillar erythema, exudates, or enlargement, moist mucous membranes, good dentition, no lesions  NECK: Supple, No JVD, normal thyroid, carotids with normal upstrokes and no bruits  CHEST/LUNG: Clear to auscultation bilaterally, No rales, rhonchi, wheezing, or rubs  HEART: Regular rate and rhythm, No murmurs, rubs, or gallops  ABDOMEN: Soft, nondistended, no masses, guarding, tenderness or rebound, bowel sounds present  EXTREMITIES:  2+ Peripheral Pulses, No clubbing, cyanosis, or edema. s/p re[aojf pf ;ef  LYMPH: No lymphadenopathy noted  SKIN: No rashes or lesions  NERVOUS SYSTEM:  Alert & Oriented . Motor Strength 5/5 right upper and right lower.  5/5 left upper and left lower extremities, DTRs 2+ intact and symmetric      Labs:  CBC Full  -  ( 17 Sep 2019 08:44 )  WBC Count : 8.33 K/uL  RBC Count : 2.76 M/uL  Hemoglobin : 8.8 g/dL  Hematocrit : 28.1 %  Platelet Count - Automated : 150 K/uL  Mean Cell Volume : 101.8 fl  Mean Cell Hemoglobin : 31.9 pg  Mean Cell Hemoglobin Concentration : 31.3 gm/dL  Auto Neutrophil # : x  Auto Lymphocyte # : x  Auto Monocyte # : x  Auto Eosinophil # : x  Auto Basophil # : x  Auto Neutrophil % : x  Auto Lymphocyte % : x  Auto Monocyte % : x  Auto Eosinophil % : x  Auto Basophil % : x    09-17    140  |  105  |  22  ----------------------------<  102<H>  4.2   |  24  |  1.26    Ca    9.0      17 Sep 2019 06:35                    CAPILLARY BLOOD GLUCOSE      POCT Blood Glucose.: 126 mg/dL (15 Sep 2019 21:39)      RADIOLOGY & ADDITIONAL TESTS: 89 yo male with Hx of CAD s/p PCI x2 as reported, TIA on plavix, Atrial Fibrillation on no AC , HLD, HTN, Dementia , UTI was brought in the hospital [facility] due to report of Ghada hematuria.  AS per daughter, pt has recurrennt UTI and was recently seen by Urologist who prescribed Monural for 3 doses , patient received 2 doses so far and started having the ghada hematuria.  Pt started having ghada hematuria started 2-3 days prior to admission  with no fever or reported abd pain.  As per daughter, patient was in hosp in March or April 2019 at another hospital for UTI and was dc to [facility] and subsequently dc to home.  As per PMD daughter reported abd distension.  A CBI was placed in the ED with reported of about one Liter of urine with Blood and clots. Bleeding has now stopped and the patient is to have a ct of abdomen and pelvis with contrast to better determine the etiology of his hematuria.  Patient states he  is comfortable. Patient less confused today.     MEDICATIONS  (STANDING):  atorvastatin 10 milliGRAM(s) Oral at bedtime  calcium carbonate 1250 mG  + Vitamin D (OsCal 500 + D) 1 Tablet(s) Oral daily  influenza   Vaccine 0.5 milliLiter(s) IntraMuscular once  multivitamin 1 Tablet(s) Oral daily  rivastigmine patch  9.5 mG/24 Hr(s) 1 Patch Transdermal every 24 hours    MEDICATIONS  (PRN):  acetaminophen    Suspension .. 650 milliGRAM(s) Oral every 4 hours PRN Mild Pain (1 - 3)  ondansetron Injectable 4 milliGRAM(s) IV Push every 6 hours PRN Nausea            Vital Signs Last 24 Hrs  T(C): 36.6 (17 Sep 2019 04:07), Max: 36.7 (16 Sep 2019 19:56)  T(F): 97.9 (17 Sep 2019 04:07), Max: 98.1 (16 Sep 2019 19:56)  HR: 77 (17 Sep 2019 04:07) (73 - 97)  BP: 131/79 (17 Sep 2019 04:07) (98/64 - 131/79)  BP(mean): --  RR: 18 (17 Sep 2019 04:07) (18 - 18)  SpO2: 96% (17 Sep 2019 04:07) (96% - 98%)    PHYSICAL EXAM:  GENERAL: NAD, well nourished and conversant  HEAD:  Atraumatic  EYES: EOM, PERRLA, conjunctiva pink and sclera white  ENT: No tonsillar erythema, exudates, or enlargement, moist mucous membranes, good dentition, no lesions  NECK: Supple, No JVD, normal thyroid, carotids with normal upstrokes and no bruits  CHEST/LUNG: Clear to auscultation bilaterally, No rales, rhonchi, wheezing, or rubs  HEART: Regular rate and rhythm, No murmurs, rubs, or gallops  ABDOMEN: Soft, nondistended, no masses, guarding, tenderness or rebound, bowel sounds present  EXTREMITIES:  2+ Peripheral Pulses, No clubbing, cyanosis, or edema. s/p re[aojf pf ;ef  LYMPH: No lymphadenopathy noted  SKIN: No rashes or lesions  NERVOUS SYSTEM:  Alert & Oriented . Motor Strength 5/5 right upper and right lower.  5/5 left upper and left lower extremities, DTRs 2+ intact and symmetric      Labs:  CBC Full  -  ( 17 Sep 2019 08:44 )  WBC Count : 8.33 K/uL  RBC Count : 2.76 M/uL  Hemoglobin : 8.8 g/dL  Hematocrit : 28.1 %  Platelet Count - Automated : 150 K/uL  Mean Cell Volume : 101.8 fl  Mean Cell Hemoglobin : 31.9 pg  Mean Cell Hemoglobin Concentration : 31.3 gm/dL  Auto Neutrophil # : x  Auto Lymphocyte # : x  Auto Monocyte # : x  Auto Eosinophil # : x  Auto Basophil # : x  Auto Neutrophil % : x  Auto Lymphocyte % : x  Auto Monocyte % : x  Auto Eosinophil % : x  Auto Basophil % : x    09-17    140  |  105  |  22  ----------------------------<  102<H>  4.2   |  24  |  1.26    Ca    9.0      17 Sep 2019 06:35                    CAPILLARY BLOOD GLUCOSE      POCT Blood Glucose.: 126 mg/dL (15 Sep 2019 21:39)      RADIOLOGY & ADDITIONAL TESTS:

## 2019-09-17 NOTE — PHYSICAL THERAPY INITIAL EVALUATION ADULT - MANUAL MUSCLE TESTING RESULTS, REHAB EVAL
QUICK REFERENCE INFORMATION:  The ABCs of the Annual Wellness Visit    Subsequent Medicare Wellness Visit    HEALTH RISK ASSESSMENT    1971    Recent Hospitalizations:  No hospitalization(s) within the last year..      Current Medical Providers:  Patient Care Team:  Mariana Mcknight APRN as PCP - General (Family Medicine)        Smoking Status:  Social History     Tobacco Use   Smoking Status Current Every Day Smoker   • Packs/day: 1.00   • Types: Cigarettes   Smokeless Tobacco Never Used       Alcohol Consumption:  Social History     Substance and Sexual Activity   Alcohol Use No       Depression Screen:   PHQ-2/PHQ-9 Depression Screening 2/18/2019   Little interest or pleasure in doing things 0   Feeling down, depressed, or hopeless 1   Total Score 1       Health Habits and Functional and Cognitive Screening:  Functional & Cognitive Status 2/18/2019   Do you have difficulty preparing food and eating? Yes   Do you have difficulty bathing yourself, getting dressed or grooming yourself? No   Do you have difficulty using the toilet? No   Do you have difficulty moving around from place to place? No   Do you have trouble with steps or getting out of a bed or a chair? No   In the past year have you fallen or experienced a near fall? Yes   Current Diet Well Balanced Diet   Dental Exam Not up to date   Eye Exam Not up to date   Exercise (times per week) 0 times per week   Current Exercise Activities Include None   Do you need help using the phone?  No   Are you deaf or do you have serious difficulty hearing?  No   Do you need help with transportation? No   Do you need help shopping? No   Do you need help preparing meals?  Yes   Do you need help with housework?  Yes   Do you need help with laundry? Yes   Do you need help taking your medications? No   Do you need help managing money? No   Do you ever drive or ride in a car without wearing a seat belt? No   Have you felt unusual stress, anger or loneliness in the last  month? Yes   Who do you live with? Other   If you need help, do you have trouble finding someone available to you? Yes   Have you been bothered in the last four weeks by sexual problems? No   Do you have difficulty concentrating, remembering or making decisions? No       Does the patient have evidence of cognitive impairment? No    Aspirin use counseling: Does not need ASA (and currently is not on it)      Recent Lab Results:  CMP:  Lab Results   Component Value Date    BUN 12 11/26/2018    CREATININE 0.62 11/26/2018    EGFRIFNONA 103 11/26/2018    BCR 19.4 11/26/2018     11/26/2018    K 4.4 11/26/2018    CO2 26.6 11/26/2018    CALCIUM 9.4 11/26/2018    ALBUMIN 3.90 11/26/2018    BILITOT 0.3 11/26/2018    ALKPHOS 67 11/26/2018    AST 21 11/26/2018    ALT 16 11/26/2018     Lipid Panel:  Lab Results   Component Value Date    CHOL 89 11/26/2018    TRIG 295 (H) 11/26/2018    HDL 30 (L) 11/26/2018    VLDL 59 11/26/2018    LDLHDL 0.00 11/26/2018     HbA1c:  Lab Results   Component Value Date    HGBA1C 10.00 (H) 11/26/2018       Visual Acuity:   Visual Acuity Screening    Right eye Left eye Both eyes   Without correction: 20/50 20/50 20/50   With correction:        Fall Risk Assessment  Fallen in past 6 months: 0--> No  Mental Status: 0--> no mental status change  Mobility: 0--> No mobility issues  Medications: 1--> Narcotics  Total Fall Risk Score: 1     Age-appropriate Screening Schedule:  Refer to the list below for future screening recommendations based on patient's age, sex and/or medical conditions. Orders for these recommended tests are listed in the plan section. The patient has been provided with a written plan.    Health Maintenance   Topic Date Due   • URINE MICROALBUMIN  1971   • DIABETIC FOOT EXAM  02/06/2017   • PAP SMEAR  02/06/2017   • DIABETIC EYE EXAM  02/06/2017   • HEMOGLOBIN A1C  05/26/2019   • LIPID PANEL  11/26/2019   • TDAP/TD VACCINES (2 - Td) 03/06/2027   • PNEUMOCOCCAL VACCINE (19-64  "MEDIUM RISK)  Addressed   • INFLUENZA VACCINE  Addressed        Subjective   History of Present Illness    Soheila Brewster is a 47 y.o. female who presents for an Subsequent Wellness Visit.    Diabetes-doing Ok.  She reports 130-145 for most checks.  She does feel she is tolerating Soliqua well.   Constipation-she reports constipation for 3-4 days but has had some diarrhea this morning.  She reports she has noted some increase abdominal pain and nausea.  She reports \"weak stomach anyway\".  She reports she is taking Linzess but feels it is not helping very well.  She has not taken any other meds.  She has noted increase since being on Norco.  Not at goal.    Back Pain-she reports she is noting new pain in her low back with more radiation out to her sides.  She also feels she is having some T Spine increased pain on the right side.  She questions if she needs new imaging as she has noted some changes in her balance.  Ongoing.      The following portions of the patient's history were reviewed and updated as appropriate: allergies, current medications, past family history, past medical history, past social history, past surgical history and problem list.    Outpatient Medications Prior to Visit   Medication Sig Dispense Refill   • azelastine (ASTELIN) 0.1 % nasal spray 2 sprays both nostrils twice daily as needed 1 each 5   • Cyanocobalamin (VITAMIN B-12 ER) 1000 MCG tablet controlled-release TAKE 1 TABLET BY MOUTH DAILY. 30 each 5   • enalapril (VASOTEC) 5 MG tablet Take 1 tablet by mouth Daily. 30 tablet 5   • enalapril (VASOTEC) 5 MG tablet TAKE 1 TABLET BY MOUTH ONCE DAILY 30 tablet 5   • ezetimibe (ZETIA) 10 MG tablet TAKE 1 TABLET BY MOUTH EVERY NIGHT. 30 tablet 5   • fenofibrate (TRICOR) 145 MG tablet TAKE 1 TABLET BY MOUTH DAILY. 30 tablet 5   • furosemide (LASIX) 40 MG tablet Take 1 tablet by mouth 2 (Two) Times a Day. 60 tablet 2   • gabapentin (NEURONTIN) 600 MG tablet Take 1 tablet by mouth 3 (Three) Times a " Day. 90 tablet 2   • glipiZIDE-metFORMIN (METAGLIP) 2.5-250 MG per tablet TAKE 1 TABLET BY MOUTH TWICE DAILY BEFORE MEALS 60 tablet 2   • guaiFENesin (MUCINEX) 600 MG 12 hr tablet Take 1 tablet by mouth 2 (Two) Times a Day. 20 tablet 0   • HYDROcodone-acetaminophen (NORCO) 7.5-325 MG per tablet   0   • Insulin Pen Needle (EASY TOUCH PEN NEEDLES) 31G X 8 MM misc 1 each Daily. 100 each 5   • levocetirizine (XYZAL) 5 MG tablet TAKE 1 TABLET BY MOUTH EVERY EVENING. 30 tablet 5   • linaclotide (LINZESS) 145 MCG capsule capsule 1 every morning before breakfast 30 capsule 5   • methocarbamol (ROBAXIN) 750 MG tablet Take 1 tablet by mouth 3 (Three) Times a Day. 90 tablet 5   • montelukast (SINGULAIR) 10 MG tablet Take 1 tablet by mouth Every Night. 30 tablet 5   • potassium chloride (K-DUR) 10 MEQ CR tablet Take 1 tablet by mouth 2 (Two) Times a Day. 60 tablet 5   • potassium chloride (K-DUR) 10 MEQ CR tablet TAKE 1 TABLET BY MOUTH TWICE DAILY 60 tablet 5   • rosuvastatin (CRESTOR) 40 MG tablet TAKE 1 TABLET BY MOUTH ONCE DAILY 90 tablet 3   • SOLIQUA 100-33 UNT-MCG/ML solution pen-injector INJECT 60 UNITS UNDER THE SKIN DAILY. 15 mL 5   • SURE COMFORT PEN NEEDLES 32G X 4 MM misc USE AS DIRECTED ONCE DAILY 100 each 11   • triamcinolone (KENALOG) 0.5 % ointment Apply  topically 3 (Three) Times a Day. To rash on arms and torso 30 g 1   • TRULICITY 0.75 MG/0.5ML solution pen-injector INJECT 0.75MG SUBQ INTO THE APPROPRIATE AREA AS DIRECTED ONCE PER WEEK 2 mL 5   • VENTOLIN  (90 Base) MCG/ACT inhaler INHALE 2 PUFFS EVERY 4 (FOUR) HOURS AS NEEDED FOR SHORTNESS OF AIR. 18 g 5   • vitamin D (ERGOCALCIFEROL) 82083 units capsule capsule TAKE 1 CAPSULE BY MOUTH EVERY 7 DAYS 4 capsule 5   • bumetanide (BUMEX) 2 MG tablet Take 1 tablet by mouth Daily. May take additional 0.5 tabs daily PRN 45 tablet 5   • esomeprazole (nexIUM) 40 MG capsule TAKE 1 CAPSULE BY MOUTH EVERY MORNING BEFORE BREAKFAST. 30 capsule 5     No  facility-administered medications prior to visit.        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    Review of Systems   Constitutional: Positive for appetite change. Negative for diaphoresis, fever and unexpected weight change.   HENT: Positive for congestion, postnasal drip and rhinorrhea. Negative for ear pain, nosebleeds, sinus pressure, sore throat and trouble swallowing.         Blood tinged mucus, thick and difficult to blow out   Eyes: Positive for pain. Negative for discharge and visual disturbance.   Respiratory: Positive for cough (with white thick production), shortness of breath (more than her usual) and wheezing.    Cardiovascular: Positive for leg swelling (more increased with acute back pain). Negative for chest pain and palpitations.   Gastrointestinal: Positive for abdominal pain (generally periumbilical.  Reports hernia in her abd area) and constipation. Negative for blood in stool, diarrhea, nausea and vomiting.   Endocrine: Positive for cold intolerance (increases her joint pain). Negative for heat intolerance, polydipsia, polyphagia and polyuria.    Genitourinary: Positive for menstrual problem. Negative for difficulty urinating, dysuria, frequency, hematuria, vaginal discharge and vaginal pain.        LADARIUS   Musculoskeletal: Positive for arthralgias (mulitple joints), back pain (nerve problems in neck and lumbar.  Disc bulge in low back), joint swelling, myalgias (muscle spasm in back), neck pain and neck stiffness.   Skin: Negative for color change.   Allergic/Immunologic: Negative for environmental allergies and food allergies.   Neurological: Positive for dizziness, weakness, numbness (BLE in feet and ankles from DM and some in bilateral legs related to back) and headaches (due to acute illness with sinus). Negative for tremors, seizures and syncope.   Hematological: Positive for adenopathy. Does not bruise/bleed easily.   Psychiatric/Behavioral: Positive for sleep disturbance (problem going to sleep and staying asleep.  Reports average of 4 hours per night.). Negative for decreased concentration, dysphoric mood and suicidal ideas. The patient is not nervous/anxious.    All other systems reviewed and are negative.      Compared to one year ago, the patient feels her physical health is worse.  She feels her arthritis pain is worse overall.    Compared to one year ago, the patient feels her mental health is the same.    Objective     Physical Exam   Constitutional: She appears well-developed and well-nourished. No distress.   HENT:   Head: Normocephalic and atraumatic.   Right Ear: Ear canal normal. Tympanic membrane is injected. Tympanic membrane is not scarred, not retracted and not bulging.   Left Ear: Ear canal normal. Tympanic membrane is injected. Tympanic membrane is not scarred, not retracted and not bulging.   Nose: Mucosal edema and rhinorrhea present. No epistaxis. Right sinus exhibits no maxillary sinus tenderness and no frontal sinus tenderness. Left sinus exhibits no maxillary sinus tenderness and no frontal sinus tenderness.   Mouth/Throat:  "Uvula is midline and mucous membranes are normal. No uvula swelling. No oropharyngeal exudate or posterior oropharyngeal erythema.   Eyes: EOM are normal. Pupils are equal, round, and reactive to light. No scleral icterus.   Neck: Normal range of motion. No thyromegaly present.   Cardiovascular: Normal rate, regular rhythm, normal heart sounds and intact distal pulses.   Pulmonary/Chest: Effort normal. She has decreased breath sounds (mildly over bronchial region of anterior chest). She has wheezes (scattered mild).   Abdominal: Soft. Bowel sounds are normal. There is no hepatosplenomegaly. There is no tenderness. A hernia is present.       Protuberant abdomen   Musculoskeletal: She exhibits tenderness.        Thoracic back: She exhibits tenderness and spasm.        Lumbar back: She exhibits decreased range of motion, tenderness, pain and spasm.   Pain with palpation over sciatic bilaterally    Lymphadenopathy:     She has cervical adenopathy.        Right cervical: Superficial cervical adenopathy present.        Left cervical: Superficial cervical adenopathy present.        Right: Supraclavicular (firm mobile node.  Mild tenderness) adenopathy present.   Neurological: She is alert. She displays normal reflexes. No cranial nerve deficit.   Skin: Skin is warm and dry. Capillary refill takes less than 2 seconds.   Psychiatric: She has a normal mood and affect. Her behavior is normal. Judgment and thought content normal.   Vitals reviewed.      Vitals:    02/18/19 1136   BP: 140/88   BP Location: Right arm   Patient Position: Sitting   Cuff Size: Adult   Pulse: 81   Temp: 98.7 °F (37.1 °C)   TempSrc: Oral   SpO2: 99%   Weight: 128 kg (282 lb)   Height: 180.3 cm (71\")       Patient's Body mass index is 39.33 kg/m². BMI is above normal parameters. Recommendations include: nutrition counseling.      Assessment/Plan   Patient Self-Management and Personalized Health Advice  The patient has been provided with information " about: weight management, tobacco cessation and designing advance directives and preventive services including:   · Advance directive, Fall Risk assessment done.    Visit Diagnoses:    ICD-10-CM ICD-9-CM   1. Medicare annual wellness visit, subsequent Z00.00 V70.0   2. Protrusion of intervertebral disc of lumbosacral region M51.27 722.10   3. Generalized edema R60.1 782.3   4. Gastroesophageal reflux disease without esophagitis K21.9 530.81   5. 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 Tobacco abuse disorder Z72.0 305.1   7. DM (diabetes mellitus) type II uncontrolled, periph vascular disorder (CMS/[facility]) E11.51 250.72    E11.65 443.81       Orders Placed This Encounter   Procedures   • MRI Thoracic Spine Without Contrast     Standing Status:   Future     Standing Expiration Date:   2/18/2020     Order Specific Question:   Patient Pregnant     Answer:   No   • MRI Lumbar Spine Without Contrast     Standing Status:   Future     Standing Expiration Date:   2/18/2020     Order Specific Question:   Patient Pregnant     Answer:   No   • Urine Drug Screen - Urine, Clean Catch     Standing Status:   Future     Scheduling Instructions:      [employer] collect today in office       Outpatient Encounter Medications as of 2/18/2019   Medication Sig Dispense Refill   • azelastine (ASTELIN) 0.1 % nasal spray 2 sprays both nostrils twice daily as needed 1 each 5   • bumetanide (BUMEX) 2 MG tablet Take 1 tablet by mouth Daily. May take additional 0.5 tabs daily PRN 45 tablet 5   • Cyanocobalamin (VITAMIN B-12 ER) 1000 MCG tablet controlled-release TAKE 1 TABLET BY MOUTH DAILY. 30 each 5   • enalapril (VASOTEC) 5 MG tablet Take 1 tablet by mouth Daily. 30 tablet 5   • enalapril (VASOTEC) 5 MG tablet TAKE 1 TABLET BY MOUTH ONCE DAILY 30 tablet 5   • esomeprazole (nexIUM) 40 MG capsule Take 1 capsule by mouth Every Morning Before Breakfast. 30 capsule 5   • ezetimibe (ZETIA)  10 MG tablet TAKE 1 TABLET BY MOUTH EVERY NIGHT. 30 tablet 5   • fenofibrate (TRICOR) 145 MG tablet TAKE 1 TABLET BY MOUTH DAILY. 30 tablet 5   • furosemide (LASIX) 40 MG tablet Take 1 tablet by mouth 2 (Two) Times a Day. 60 tablet 2   • gabapentin (NEURONTIN) 600 MG tablet Take 1 tablet by mouth 3 (Three) Times a Day. 90 tablet 2   • glipiZIDE-metFORMIN (METAGLIP) 2.5-250 MG per tablet TAKE 1 TABLET BY MOUTH TWICE DAILY BEFORE MEALS 60 tablet 2   • guaiFENesin (MUCINEX) 600 MG 12 hr tablet Take 1 tablet by mouth 2 (Two) Times a Day. 20 tablet 0   • HYDROcodone-acetaminophen (NORCO) 7.5-325 MG per tablet   0   • Insulin Pen Needle (EASY TOUCH PEN NEEDLES) 31G X 8 MM misc 1 each Daily. 100 each 5   • levocetirizine (XYZAL) 5 MG tablet TAKE 1 TABLET BY MOUTH EVERY EVENING. 30 tablet 5   • linaclotide (LINZESS) 145 MCG capsule capsule 1 every morning before breakfast 30 capsule 5   • methocarbamol (ROBAXIN) 750 MG tablet Take 1 tablet by mouth 3 (Three) Times a Day. 90 tablet 5   • montelukast (SINGULAIR) 10 MG tablet Take 1 tablet by mouth Every Night. 30 tablet 5   • potassium chloride (K-DUR) 10 MEQ CR tablet Take 1 tablet by mouth 2 (Two) Times a Day. 60 tablet 5   • potassium chloride (K-DUR) 10 MEQ CR tablet TAKE 1 TABLET BY MOUTH TWICE DAILY 60 tablet 5   • rosuvastatin (CRESTOR) 40 MG tablet TAKE 1 TABLET BY MOUTH ONCE DAILY 90 tablet 3   • SOLIQUA 100-33 UNT-MCG/ML solution pen-injector INJECT 60 UNITS UNDER THE SKIN DAILY. 15 mL 5   • SURE COMFORT PEN NEEDLES 32G X 4 MM misc USE AS DIRECTED ONCE DAILY 100 each 11   • triamcinolone (KENALOG) 0.5 % ointment Apply  topically 3 (Three) Times a Day. To rash on arms and torso 30 g 1   • TRULICITY 0.75 MG/0.5ML solution pen-injector INJECT 0.75MG SUBQ INTO THE APPROPRIATE AREA AS DIRECTED ONCE PER WEEK 2 mL 5   • VENTOLIN  (90 Base) MCG/ACT inhaler INHALE 2 PUFFS EVERY 4 (FOUR) HOURS AS NEEDED FOR SHORTNESS OF AIR. 18 g 5   • vitamin D (ERGOCALCIFEROL) 28225  units capsule capsule TAKE 1 CAPSULE BY MOUTH EVERY 7 DAYS 4 capsule 5   • [DISCONTINUED] bumetanide (BUMEX) 2 MG tablet Take 1 tablet by mouth Daily. May take additional 0.5 tabs daily PRN 45 tablet 5   • [DISCONTINUED] esomeprazole (nexIUM) 40 MG capsule TAKE 1 CAPSULE BY MOUTH EVERY MORNING BEFORE BREAKFAST. 30 capsule 5   • simethicone (GAS-X) 80 MG chewable tablet Chew 2 tablets Every 6 (Six) Hours As Needed for Flatulence. 120 tablet 5     No facility-administered encounter medications on file as of 2/18/2019.        Reviewed use of high risk medication in the elderly: not applicable  Reviewed for potential of harmful drug interactions in the elderly: not applicable    Follow Up:  Return in about 1 month (around 3/18/2019) for GI complaint follow up.     Falls risk assessment performed.  Patient advised to limit clutter, have well lit areas, do not walk around in darkness (use nightlight PRN), Non skid rugs if using rugs, use caution if small pets at home.  Non skid foot wear.    Dietary counseling provided:  Healthy food choices including fruits and vegetables, limit soda and junk foods, adequate water intake.  Refill on routine maintenance meds today.     If not improved with constipation and GI upset, will consider GI referral for possible scope.   Referral to Dr Garner for vision update.    RTC 1 month, sooner if needed.       An After Visit Summary and PPPS with all of these plans were given to the patient.          grossly BUE 3/5, BLE 3/5 except B hip flex and knee ext 2+/5

## 2019-09-17 NOTE — PROGRESS NOTE ADULT - ATTENDING COMMENTS
Extensive hematuria and awaiting Urine culture.  CT of abdomen and pelvis to result,  prior to cystoscopy.

## 2019-09-17 NOTE — PROGRESS NOTE ADULT - ASSESSMENT
89 yo male with Hx of CAD s/p PCI x2 as reported, TIA on plavix, Atrial Fibrillation on no AC , HLD, HTN, Dementia , UTI was brought in the hospital [facility] due to report of Stanislaw hematuria.  No fever, no leukocytosis  No dysuria, no pyuria; patient denies symptoms aside from hematuria  Recent treatment with monurol  by report a drug resistant urine isolate as outpatient.  Lethargy prior to admission concerning to the family  Presently no signs UTI, can monitor off abx  Hematuria improved with CBI  Suggest:  1. monitor off antibiotics  2.CBI per urology  3.CT scan advised by  urology  4.Blood culture any fever spikes

## 2019-09-17 NOTE — PHYSICAL THERAPY INITIAL EVALUATION ADULT - TRANSFER SAFETY CONCERNS NOTED: SIT/STAND, REHAB EVAL
decreased sequencing ability/decreased safety awareness/losing balance/decreased step length/decreased weight-shifting ability/decreased balance during turns

## 2019-09-17 NOTE — PHYSICAL THERAPY INITIAL EVALUATION ADULT - ADDITIONAL COMMENTS
PTA pt lived in independent living apt alone, has 24/7 HHA, ambulates with RW, req assist for most ADLs, just started receiving home PT/OT.

## 2019-09-17 NOTE — ED ADULT NURSE NOTE - EXTERNAL GENITALIA
Spoke with pt, per physician, medication continues to work after last dose, pt can call Thursday if symptoms has not changed.
normal

## 2019-09-17 NOTE — PROGRESS NOTE ADULT - SUBJECTIVE AND OBJECTIVE BOX
CC: f/u for hematuria and drug resistant urine isolate    Patient reports: he is sleepy, CBI continues, urine is without gross blood    REVIEW OF SYSTEMS:  All other review of systems negative (Comprehensive ROS)    Antimicrobials Day #  :off    Other Medications Reviewed    T(F): 97.9 (09-17-19 @ 04:07), Max: 98.1 (09-16-19 @ 19:56)  HR: 77 (09-17-19 @ 04:07)  BP: 131/79 (09-17-19 @ 04:07)  RR: 18 (09-17-19 @ 04:07)  SpO2: 96% (09-17-19 @ 04:07)  Wt(kg): --    PHYSICAL EXAM:  General: alert, no acute distress, sleepy  Eyes:  anicteric, no conjunctival injection, no discharge  Oropharynx: no lesions or injection 	  Neck: supple, without adenopathy  Lungs: clear to auscultation  Heart: irregular rate and rhythm; no murmur, rubs or gallops  Abdomen: soft, nondistended, nontender, without mass or organomegaly  Skin: no lesions  Extremities: no clubbing, cyanosis, or edema  Neurologic: alert, baseline confusion, moves all extremities   osorio with CBI  LAB RESULTS:                        8.8    8.33  )-----------( 150      ( 17 Sep 2019 08:44 )             28.1     09-17    140  |  105  |  22  ----------------------------<  102<H>  4.2   |  24  |  1.26    Ca    9.0      17 Sep 2019 06:35          MICROBIOLOGY:  RECENT CULTURES:  09-14 @ 10:07 .Blood     No growth to date.      09-13 @ 22:59 .Blood     No growth to date.      09-13 @ 18:11 .Urine     No growth          RADIOLOGY REVIEWED:    < from: US Kidney and Bladder (09.13.19 @ 16:15) >    INTERPRETATION:  CLINICAL INFORMATION: Recurrent UTI, hematuria,   abdominal distention.    COMPARISON: None available.    TECHNIQUE: Sonography of the kidneys and bladder.     FINDINGS:    Right kidney:  10.1 cm. Increased parenchymal echogenicity. No renal   mass, hydronephrosis or calculi.    Left kidney:  10.8 cm. Increased parenchymal echogenicity. No renal mass,   hydronephrosis or calculi.    Urinary bladder: Decompressed secondary to Osorio catheter.    IMPRESSION:     No hydronephrosis.    < end of copied text >

## 2019-09-17 NOTE — PHYSICAL THERAPY INITIAL EVALUATION ADULT - IMPAIRMENTS FOUND, PT EVAL
arousal, attention, and cognition/gait, locomotion, and balance/decreased midline orientation/posture/aerobic capacity/endurance/cognitive impairment/fine motor/muscle strength

## 2019-09-17 NOTE — PHYSICAL THERAPY INITIAL EVALUATION ADULT - PERTINENT HX OF CURRENT PROBLEM, REHAB EVAL
Pt is a 89 yo m  A Fib (on Plavix), Dementia, HTN, HLD, BIBEMS from [facility] w/ ghada hematuria. states pt was recently placed on Monurol for UTI. today was noted to have ghada hematuria. Pt with hallucinations, pending possible psych and head CT.
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
## 2019-09-17 NOTE — PHYSICAL THERAPY INITIAL EVALUATION ADULT - ASR WT BEARING STATUS EVAL
DAEE treated as NWB at this time due to pt complaint of pain L wrist with movement and inc swelling noted; NP Alissa aware/no weight-bearing restrictions

## 2019-09-18 LAB
CULTURE RESULTS: SIGNIFICANT CHANGE UP
HCT VFR BLD CALC: 26.2 % — LOW (ref 39–50)
HGB BLD-MCNC: 8.7 G/DL — LOW (ref 13–17)
MCHC RBC-ENTMCNC: 32.4 PG — SIGNIFICANT CHANGE UP (ref 27–34)
MCHC RBC-ENTMCNC: 33.1 GM/DL — SIGNIFICANT CHANGE UP (ref 32–36)
MCV RBC AUTO: 97.9 FL — SIGNIFICANT CHANGE UP (ref 80–100)
PLATELET # BLD AUTO: 181 K/UL — SIGNIFICANT CHANGE UP (ref 150–400)
RBC # BLD: 2.68 M/UL — LOW (ref 4.2–5.8)
RBC # FLD: 14.4 % — SIGNIFICANT CHANGE UP (ref 10.3–14.5)
SPECIMEN SOURCE: SIGNIFICANT CHANGE UP
WBC # BLD: 10.6 K/UL — HIGH (ref 3.8–10.5)
WBC # FLD AUTO: 10.6 K/UL — HIGH (ref 3.8–10.5)

## 2019-09-18 PROCEDURE — 71045 X-RAY EXAM CHEST 1 VIEW: CPT | Mod: 26

## 2019-09-18 RX ORDER — IPRATROPIUM/ALBUTEROL SULFATE 18-103MCG
3 AEROSOL WITH ADAPTER (GRAM) INHALATION ONCE
Refills: 0 | Status: COMPLETED | OUTPATIENT
Start: 2019-09-18 | End: 2019-09-18

## 2019-09-18 RX ADMIN — RIVASTIGMINE 1 PATCH: 4.6 PATCH, EXTENDED RELEASE TRANSDERMAL at 16:38

## 2019-09-18 RX ADMIN — RIVASTIGMINE 1 PATCH: 4.6 PATCH, EXTENDED RELEASE TRANSDERMAL at 07:20

## 2019-09-18 RX ADMIN — RIVASTIGMINE 1 PATCH: 4.6 PATCH, EXTENDED RELEASE TRANSDERMAL at 22:00

## 2019-09-18 RX ADMIN — Medication 1 TABLET(S): at 15:16

## 2019-09-18 RX ADMIN — Medication 3 MILLILITER(S): at 18:32

## 2019-09-18 RX ADMIN — RIVASTIGMINE 1 PATCH: 4.6 PATCH, EXTENDED RELEASE TRANSDERMAL at 21:36

## 2019-09-18 RX ADMIN — Medication 100 MILLIGRAM(S): at 06:58

## 2019-09-18 RX ADMIN — Medication 100 MILLIGRAM(S): at 15:16

## 2019-09-18 NOTE — PROGRESS NOTE ADULT - ASSESSMENT
89 yo male with Hx of CAD s/p PCI x2 as reported, TIA on plavix, Atrial Fibrillation on no AC , HLD, HTN, Dementia , UTI was brought [facility] the [facility] due to report of Stanislaw hematuria.  No fever, no leukocytosis  No dysuria, no pyuria; patient denies symptoms aside from hematuria  Recent treatment with monurol  by report a drug resistant urine isolate as outpatient.  Lethargy prior to admission concerning to the family  Presently no signs UTI, can monitor off abx  Hematuria improved with CBI  CT with thickening of urinary bladder  No signs of ongoing infection  Suggest:  1. monitor off antibiotics  2.CBI per urology  3.Blood culture any fever spikes

## 2019-09-18 NOTE — PROGRESS NOTE ADULT - SUBJECTIVE AND OBJECTIVE BOX
CC: f/u for uti     Patient reports; no specific complaints, CBI continues with clear urine.    REVIEW OF SYSTEMS:  All other review of systems negative (Comprehensive ROS)    Antimicrobials Day #  :off    Other Medications Reviewed    T(F): 98.4 (09-18-19 @ 05:32), Max: 98.6 (09-17-19 @ 20:21)  HR: 77 (09-18-19 @ 05:32)  BP: 150/77 (09-18-19 @ 05:32)  RR: 18 (09-18-19 @ 05:32)  SpO2: 98% (09-18-19 @ 05:32)  Wt(kg): --    PHYSICAL EXAM:  General: alert, no acute distress  Eyes:  anicteric, no conjunctival injection, no discharge  Oropharynx: no lesions or injection 	  Neck: supple, without adenopathy  Lungs: clear to auscultation  Heart: irregular rate and rhythm; +zeke  Abdomen: soft, nondistended, nontender, without mass or organomegaly  Skin: no lesions  Extremities: no clubbing, cyanosis, or edema  Neurologic: alert, oriented, moves all extremities    LAB RESULTS:                        8.7    10.6  )-----------( 181      ( 18 Sep 2019 06:30 )             26.2     09-17    140  |  105  |  22  ----------------------------<  102<H>  4.2   |  24  |  1.26    Ca    9.0      17 Sep 2019 06:35          MICROBIOLOGY:  RECENT CULTURES:  09-14 @ 10:07 .Blood     No growth to date.      09-13 @ 22:59 .Blood     No growth to date.      09-13 @ 18:11 .Urine     No growth          RADIOLOGY REVIEWED:  < from: CT Abdomen and Pelvis w/ IV Cont (09.17.19 @ 19:54) >  BLADDER: Contains a Mai catheter balloon. Mural thickening of the   urinary bladder with surrounding inflammatory changes, possibly cystitis.  REPRODUCTIVE ORGANS: Prostate is enlarged.    BOWEL: No bowel obstruction. Colonic diverticulosis.  PERITONEUM: No ascites.  VESSELS: Atherosclerotic changes.  RETROPERITONEUM/LYMPH NODES: No lymphadenopathy.    ABDOMINAL WALL: Within normal limits.  BONES: Degenerative changes. Old left rib fracture.    IMPRESSION:     Cystitis.    < end of copied text >

## 2019-09-18 NOTE — PROGRESS NOTE ADULT - SUBJECTIVE AND OBJECTIVE BOX
89 yo male with Hx of CAD s/p PCI x2 as reported, TIA on plavix, Atrial Fibrillation on no AC , HLD, HTN, Dementia , UTI was brought in the hospital [facility] due to report of Ghada hematuria.  AS per daughter, pt has recurrennt UTI and was recently seen by Urologist who prescribed Monural for 3 doses , patient received 2 doses so far and started having the ghada hematuria.  Pt started having ghada hematuria started 2-3 days prior to admission  with no fever or reported abd pain.  As per daughter, patient was in hosp in March or April 2019 at another hospital for UTI and was dc to [facility] and subsequently dc to home.  As per PMD daughter reported abd distension.  A CBI was placed in the ED with reported of about one Liter of urine with Blood and clots. Bleeding has now stopped and the patient is to have a ct of abdomen and pelvis with contrast to better determine the etiology of his hematuria.  Patient states he  is comfortable. Patient less confused today.     MEDICATIONS  (STANDING):  atorvastatin 10 milliGRAM(s) Oral at bedtime  calcium carbonate 1250 mG  + Vitamin D (OsCal 500 + D) 1 Tablet(s) Oral daily  docusate sodium 100 milliGRAM(s) Oral three times a day  influenza   Vaccine 0.5 milliLiter(s) IntraMuscular once  multivitamin 1 Tablet(s) Oral daily  rivastigmine patch  9.5 mG/24 Hr(s) 1 Patch Transdermal every 24 hours  senna 2 Tablet(s) Oral at bedtime    MEDICATIONS  (PRN):  acetaminophen    Suspension .. 650 milliGRAM(s) Oral every 4 hours PRN Mild Pain (1 - 3)  ondansetron Injectable 4 milliGRAM(s) IV Push every 6 hours PRN Nausea  polyethylene glycol 3350 17 Gram(s) Oral at bedtime PRN Constipation          VITALS:   T(C): 36.9 (09-18-19 @ 05:32), Max: 37 (09-17-19 @ 20:21)  HR: 77 (09-18-19 @ 05:32) (71 - 85)  BP: 150/77 (09-18-19 @ 05:32) (147/83 - 155/81)  RR: 18 (09-18-19 @ 05:32) (18 - 18)  SpO2: 98% (09-18-19 @ 05:32) (94% - 98%)  Wt(kg): --    PHYSICAL EXAM:  GENERAL: NAD, well nourished and conversant  HEAD:  Atraumatic  EYES: EOM, PERRLA, conjunctiva pink and sclera white  ENT: No tonsillar erythema, exudates, or enlargement, moist mucous membranes, good dentition, no lesions  NECK: Supple, No JVD, normal thyroid, carotids with normal upstrokes and no bruits  CHEST/LUNG: Clear to auscultation bilaterally, No rales, rhonchi, wheezing, or rubs  HEART: Regular rate and rhythm, No murmurs, rubs, or gallops  ABDOMEN: Soft, nondistended, no masses, guarding, tenderness or rebound, bowel sounds present  EXTREMITIES:  2+ Peripheral Pulses, No clubbing, cyanosis, or edema. s/p re[aojf pf ;ef  LYMPH: No lymphadenopathy noted  SKIN: No rashes or lesions  NERVOUS SYSTEM:  Alert & Oriented . Motor Strength 5/5 right upper and right lower.  5/5 left upper and left lower extremities, DTRs 2+ intact and symmetric    LABS:        CBC Full  -  ( 18 Sep 2019 06:30 )  WBC Count : 10.6 K/uL  RBC Count : 2.68 M/uL  Hemoglobin : 8.7 g/dL  Hematocrit : 26.2 %  Platelet Count - Automated : 181 K/uL  Mean Cell Volume : 97.9 fl  Mean Cell Hemoglobin : 32.4 pg  Mean Cell Hemoglobin Concentration : 33.1 gm/dL  Auto Neutrophil # : x  Auto Lymphocyte # : x  Auto Monocyte # : x  Auto Eosinophil # : x  Auto Basophil # : x  Auto Neutrophil % : x  Auto Lymphocyte % : x  Auto Monocyte % : x  Auto Eosinophil % : x  Auto Basophil % : x    09-17    140  |  105  |  22  ----------------------------<  102<H>  4.2   |  24  |  1.26    Ca    9.0      17 Sep 2019 06:35            CAPILLARY BLOOD GLUCOSE          RADIOLOGY & ADDITIONAL TESTS:

## 2019-09-18 NOTE — PROGRESS NOTE ADULT - ASSESSMENT
89yM with UTI and gross hematuria.    -Stop CBI  -Monitor urine off CBI  -Keep osorio in place  -CT shows cystitis.  -F/u cultures  -Hydration  -Pain control  -Bowel regimen  -Abx per primary

## 2019-09-18 NOTE — PROGRESS NOTE ADULT - SUBJECTIVE AND OBJECTIVE BOX
Urology Progress Note    Overnight Events:  No acute urologic events overnight. CT urogram shows cystitis. Urine clear on CBI.    Review of Systems:   Constitutional: No weight loss, no weakness  CV: No chest pain, no chest pressure  Pulm: No shortness of breath, cough, or sputum    Physical Exam:  Vital signs  T(C): 36.9 (09-18-19 @ 05:32), Max: 37 (09-17-19 @ 20:21)  HR: 77 (09-18-19 @ 05:32)  BP: 150/77 (09-18-19 @ 05:32)  SpO2: 98% (09-18-19 @ 05:32)  Wt(kg): --    Gen: No acute distress. Normal mood  Abd: soft, non-tender, non-distended  : Non-palpable bladder    Labs:      09-18 @ 06:30    WBC 10.6  / Hct 26.2  / SCr --       09-17 @ 08:44    WBC 8.33  / Hct 28.1  / SCr --       09-17    140  |  105  |  22  ----------------------------<  102<H>  4.2   |  24  |  1.26    Ca    9.0      17 Sep 2019 06:35

## 2019-09-19 LAB
CULTURE RESULTS: SIGNIFICANT CHANGE UP
HCT VFR BLD CALC: 26 % — LOW (ref 39–50)
HGB BLD-MCNC: 8.4 G/DL — LOW (ref 13–17)
MCHC RBC-ENTMCNC: 31.9 PG — SIGNIFICANT CHANGE UP (ref 27–34)
MCHC RBC-ENTMCNC: 32.3 GM/DL — SIGNIFICANT CHANGE UP (ref 32–36)
MCV RBC AUTO: 98.9 FL — SIGNIFICANT CHANGE UP (ref 80–100)
PLATELET # BLD AUTO: 175 K/UL — SIGNIFICANT CHANGE UP (ref 150–400)
RBC # BLD: 2.63 M/UL — LOW (ref 4.2–5.8)
RBC # FLD: 15.9 % — HIGH (ref 10.3–14.5)
SPECIMEN SOURCE: SIGNIFICANT CHANGE UP
WBC # BLD: 9.22 K/UL — SIGNIFICANT CHANGE UP (ref 3.8–10.5)
WBC # FLD AUTO: 9.22 K/UL — SIGNIFICANT CHANGE UP (ref 3.8–10.5)

## 2019-09-19 RX ORDER — TAMSULOSIN HYDROCHLORIDE 0.4 MG/1
1 CAPSULE ORAL
Qty: 0 | Refills: 0 | DISCHARGE

## 2019-09-19 RX ORDER — FINASTERIDE 5 MG/1
1 TABLET, FILM COATED ORAL
Qty: 0 | Refills: 0 | DISCHARGE

## 2019-09-19 RX ORDER — FINASTERIDE 5 MG/1
5 TABLET, FILM COATED ORAL DAILY
Refills: 0 | Status: DISCONTINUED | OUTPATIENT
Start: 2019-09-19 | End: 2019-09-24

## 2019-09-19 RX ORDER — TAMSULOSIN HYDROCHLORIDE 0.4 MG/1
0.4 CAPSULE ORAL AT BEDTIME
Refills: 0 | Status: DISCONTINUED | OUTPATIENT
Start: 2019-09-19 | End: 2019-09-24

## 2019-09-19 RX ORDER — SODIUM CHLORIDE 9 MG/ML
1000 INJECTION INTRAMUSCULAR; INTRAVENOUS; SUBCUTANEOUS
Refills: 0 | Status: DISCONTINUED | OUTPATIENT
Start: 2019-09-19 | End: 2019-09-24

## 2019-09-19 RX ADMIN — RIVASTIGMINE 1 PATCH: 4.6 PATCH, EXTENDED RELEASE TRANSDERMAL at 22:03

## 2019-09-19 RX ADMIN — Medication 1 ENEMA: at 05:36

## 2019-09-19 RX ADMIN — ATORVASTATIN CALCIUM 10 MILLIGRAM(S): 80 TABLET, FILM COATED ORAL at 21:53

## 2019-09-19 RX ADMIN — RIVASTIGMINE 1 PATCH: 4.6 PATCH, EXTENDED RELEASE TRANSDERMAL at 21:51

## 2019-09-19 RX ADMIN — RIVASTIGMINE 1 PATCH: 4.6 PATCH, EXTENDED RELEASE TRANSDERMAL at 19:17

## 2019-09-19 RX ADMIN — TAMSULOSIN HYDROCHLORIDE 0.4 MILLIGRAM(S): 0.4 CAPSULE ORAL at 21:57

## 2019-09-19 RX ADMIN — SODIUM CHLORIDE 40 MILLILITER(S): 9 INJECTION INTRAMUSCULAR; INTRAVENOUS; SUBCUTANEOUS at 20:56

## 2019-09-19 RX ADMIN — SENNA PLUS 2 TABLET(S): 8.6 TABLET ORAL at 21:53

## 2019-09-19 RX ADMIN — RIVASTIGMINE 1 PATCH: 4.6 PATCH, EXTENDED RELEASE TRANSDERMAL at 12:58

## 2019-09-19 RX ADMIN — Medication 100 MILLIGRAM(S): at 21:53

## 2019-09-19 RX ADMIN — FINASTERIDE 5 MILLIGRAM(S): 5 TABLET, FILM COATED ORAL at 21:57

## 2019-09-19 NOTE — SWALLOW BEDSIDE ASSESSMENT ADULT - SWALLOW EVAL: DIAGNOSIS
Based on subjective bedside swallow evaluation, Pt presents with grossly functional oropharyngeal swallow for tolerance of puree diet. Due to recent choking/vomiting episode, would suggest MBS to provide objective assessment of pharyngoesophageal function. Based on current bedside swallow evaluation, Pt presents with grossly functional oropharyngeal swallow for tolerance of puree diet. Due to recent choking/vomiting episode, would suggest MBS to provide objective assessment of pharyngeal and pharyngoesophageal function.

## 2019-09-19 NOTE — SWALLOW BEDSIDE ASSESSMENT ADULT - SLP GENERAL OBSERVATIONS
Pt seated upright in chair AA+OX1-2 to person and "hospital". Disoriented to date/time: "I don't know". Able to state president: "Trump". Disoriented to situation. "I don't know" when asked why he was in the hospital. Able to follow simple commands. Endorses that he does not remember things well. Mild tremor noted of UEs, Pt reports as baseline. Pt c/o mucous stuck in his throat last night. Recalls difficulty swallowing. Unable to provide further details. Per RN, Pt was being fed, began to cough, complained of food stuck in mid-sternal region, coughed, and then vomited a small amount. Required suctioning of puree material and mucous. Pt appeared stable sometime after. Meds were attempted crushed in applesauce; however, coughing and wet gurgling sound. PCA reports prior to these episodes, Pt had been tolerating puree diet w/o difficulty X4 days. Pt requires intermittent assist w/ feeding 2/2 UE tremors.

## 2019-09-19 NOTE — CHART NOTE - NSCHARTNOTEFT_GEN_A_CORE
Interval events  Notified by RN that patient coughing while drinking thin liquid.  Evaluated the patient at bedside. patient mentating t baseline, not desaturating not in respiratory distress  Patient has been worked up during day for aspiration PNA. CXR results with no acute findings, please F/U official results  Patient afebrile  Patient Hd stable    Vital Signs Last 24 Hrs  T(C): 36.6 (19 Sep 2019 04:36), Max: 36.6 (19 Sep 2019 04:36)  T(F): 97.9 (19 Sep 2019 04:36), Max: 97.9 (19 Sep 2019 04:36)  HR: 87 (19 Sep 2019 04:36) (74 - 87)  BP: 103/65 (19 Sep 2019 04:36) (103/65 - 146/85)  BP(mean): --  RR: 18 (19 Sep 2019 04:36) (18 - 18)  SpO2: 99% (19 Sep 2019 04:36) (96% - 100%)      Aspiration/Risk for aspiration PNA  NPO  Repeat s&s evaluation  Monitor fever  Aspiration precautions  Will follow    Alphonse Waggoner   [id number]

## 2019-09-19 NOTE — SWALLOW BEDSIDE ASSESSMENT ADULT - COMMENTS
(ID) 9/15: ID consulted, recommended to monitor symptoms off abx. Pt with mild confusion. Psych consult considered. Less confused off abx. 9/18: ID f/u reveals no signs of ongoing infection. Continue to monitor off abx. BCX if fever spikes.  Per medicine, anemia. Transfused 1PRBC. (CT A/P) 9/17: Cystitis. Urology following, urine clear. CT Urogram shows cystitis. F/u cultures. Pt with episode of coughing leading to min vomiting, followed by additional overt signs of possible aspiration with thin liquids and meds with applesauce. Made NPO. Pt mentating at baseline, not desaturating not in respiratory distress. Patient has been worked up during day for aspiration PNA. CXR: clear lungs. Pt afebrile. WBC WNL.    Passed Dysphagia screen in ED on 9/13. Tolerated puree from 9/14 to 9/18. Solids deferred to MBS due to obstruction risk. (ID) 9/15: ID consulted, recommended to monitor symptoms off abx. Pt with mild confusion. Psych consult considered. Less confused off abx. 9/18: ID f/u reveals no signs of ongoing infection. Continue to monitor off abx. BCX if fever spikes.  Per medicine, anemia. Transfused 1PRBC. (CT A/P) 9/17: Cystitis. Urology following, urine clear. CT Urogram shows cystitis. F/u cultures. Pt with episode of coughing leading to min vomiting, requiring suctioning, followed by additional overt signs of possible aspiration with thin liquids and meds with applesauce. Made NPO. Pt mentating at baseline, not desaturating not in respiratory distress. Patient has been worked up during day for aspiration PNA. CXR: clear lungs. Pt afebrile. WBC WNL.  Passed Dysphagia screen in ED on 9/13. Per nursing staff, patient tolerated puree diet without difficulties from 9/14 to 9/18.

## 2019-09-19 NOTE — PROGRESS NOTE ADULT - SUBJECTIVE AND OBJECTIVE BOX
89 yo male with Hx of CAD s/p PCI x2 as reported, TIA on plavix, Atrial Fibrillation on no AC , HLD, HTN, Dementia , UTI was brought in the hospital [facility] due to report of Ghada hematuria.  AS per daughter, pt has recurrennt UTI and was recently seen by Urologist who prescribed Monural for 3 doses , patient received 2 doses so far and started having the ghada hematuria.  Pt started having ghada hematuria started 2-3 days prior to admission  with no fever or reported abd pain.  As per daughter, patient was in hosp in March or April 2019 at another hospital for UTI and was dc to [facility] and subsequently dc to home.  As per PMD daughter reported abd distension.  A CBI was placed in the ED with reported of about one Liter of urine with Blood and clots. Bleeding has now stopped and the patient is to have a ct of abdomen and pelvis with contrast to better determine the etiology of his hematuria.  Patient states he  is comfortable. Patient less confused today. Patient with issues with swallowing this am. Speech and swallow seeing Patient.     MEDICATIONS  (STANDING):  atorvastatin 10 milliGRAM(s) Oral at bedtime  calcium carbonate 1250 mG  + Vitamin D (OsCal 500 + D) 1 Tablet(s) Oral daily  docusate sodium 100 milliGRAM(s) Oral three times a day  finasteride 5 milliGRAM(s) Oral daily  multivitamin 1 Tablet(s) Oral daily  rivastigmine patch  9.5 mG/24 Hr(s) 1 Patch Transdermal every 24 hours  senna 2 Tablet(s) Oral at bedtime  tamsulosin 0.4 milliGRAM(s) Oral at bedtime    MEDICATIONS  (PRN):  acetaminophen    Suspension .. 650 milliGRAM(s) Oral every 4 hours PRN Mild Pain (1 - 3)  ondansetron Injectable 4 milliGRAM(s) IV Push every 6 hours PRN Nausea  polyethylene glycol 3350 17 Gram(s) Oral at bedtime PRN Constipation          VITALS:   T(C): 36.5 (09-19-19 @ 15:06), Max: 36.6 (09-19-19 @ 04:36)  HR: 70 (09-19-19 @ 15:06) (64 - 87)  BP: 121/71 (09-19-19 @ 15:06) (103/65 - 148/75)  RR: 18 (09-19-19 @ 15:06) (18 - 18)  SpO2: 97% (09-19-19 @ 15:06) (96% - 100%)  Wt(kg): --    PHYSICAL EXAM:  GENERAL: NAD, well nourished and conversant  HEAD:  Atraumatic  EYES: EOM, PERRLA, conjunctiva pink and sclera white  ENT: No tonsillar erythema, exudates, or enlargement, moist mucous membranes, good dentition, no lesions  NECK: Supple, No JVD, normal thyroid, carotids with normal upstrokes and no bruits  CHEST/LUNG: Clear to auscultation bilaterally, No rales, rhonchi, wheezing, or rubs  HEART: Regular rate and rhythm, No murmurs, rubs, or gallops  ABDOMEN: Soft, nondistended, no masses, guarding, tenderness or rebound, bowel sounds present  EXTREMITIES:  2+ Peripheral Pulses, No clubbing, cyanosis, or edema. s/p re[aojf pf ;ef  LYMPH: No lymphadenopathy noted  SKIN: No rashes or lesions  NERVOUS SYSTEM:  Alert & Oriented . Motor Strength 5/5 right upper and right lower.  5/5 left upper and left lower extremities, DTRs 2+ intact and symmetric    LABS:        CBC Full  -  ( 19 Sep 2019 08:33 )  WBC Count : 9.22 K/uL  RBC Count : 2.63 M/uL  Hemoglobin : 8.4 g/dL  Hematocrit : 26.0 %  Platelet Count - Automated : 175 K/uL  Mean Cell Volume : 98.9 fl  Mean Cell Hemoglobin : 31.9 pg  Mean Cell Hemoglobin Concentration : 32.3 gm/dL  Auto Neutrophil # : x  Auto Lymphocyte # : x  Auto Monocyte # : x  Auto Eosinophil # : x  Auto Basophil # : x  Auto Neutrophil % : x  Auto Lymphocyte % : x  Auto Monocyte % : x  Auto Eosinophil % : x  Auto Basophil % : x                CAPILLARY BLOOD GLUCOSE          RADIOLOGY & ADDITIONAL TESTS:

## 2019-09-19 NOTE — PROGRESS NOTE ADULT - SUBJECTIVE AND OBJECTIVE BOX
CC: f/u for hematuria and retention    Patient reports: mild RLQ pain    REVIEW OF SYSTEMS:  All other review of systems negative (Comprehensive ROS)    Antimicrobials Day #  :off    Other Medications Reviewed    T(F): 97.7 (09-19-19 @ 15:06), Max: 97.9 (09-19-19 @ 04:36)  HR: 70 (09-19-19 @ 15:06)  BP: 121/71 (09-19-19 @ 15:06)  RR: 18 (09-19-19 @ 15:06)  SpO2: 97% (09-19-19 @ 15:06)  Wt(kg): --    PHYSICAL EXAM:  General: alert, no acute distress  Eyes:  anicteric, no conjunctival injection, no discharge  Oropharynx: no lesions or injection 	  Neck: supple, without adenopathy  Lungs: clear to auscultation  Heart: regular rate and rhythm; no murmur, rubs or gallops  Abdomen: soft, nondistended, nontender, without mass or organomegaly, minimal RLQ pain  Skin: no lesions  Extremities: no clubbing, cyanosis, or edema  Neurologic: alert, oriented, moves all extremities   osorio with clear urine  LAB RESULTS:                        8.4    9.22  )-----------( 175      ( 19 Sep 2019 08:33 )             26.0               MICROBIOLOGY:  RECENT CULTURES:      RADIOLOGY REVIEWED:

## 2019-09-19 NOTE — SWALLOW BEDSIDE ASSESSMENT ADULT - SLP PERTINENT HISTORY OF CURRENT PROBLEM
89 y/o male with Hx of CAD s/p PCI x2 as reported, TIA on plavix, Atrial Fibrillation on no AC, HLD, HTN, dementia, UTI. [facility] report of ghada hematuria. As per daughter, Pt has recurrent UTI and was recently seen by urologist who prescribed Monural for 3 doses, patient received 2 doses so far and started having the ghada hematuria. Hematuria started 2-3 days ago with no fever or reported abd pain. As per daughter, patient was in OSH in March or April 2019 for UTI and was dc to [facility] and subsequently dc to home. As per PMD, daughter reported abd distension. A CBI was placed in the ED with reported of about one Liter of urine with blood and clots.

## 2019-09-19 NOTE — PROGRESS NOTE ADULT - ASSESSMENT
89 yo male with Hx of CAD s/p PCI x2 as reported, TIA on plavix, Atrial Fibrillation on no AC , HLD, HTN, Dementia , UTI was brought in the hospital [facility] due to report of Stanislaw hematuria.  No fever, no leukocytosis  No dysuria, no pyuria; patient denies symptoms aside from hematuria  Recent treatment with monurol  by report a drug resistant urine isolate as outpatient.  Lethargy prior to admission concerning to the family  Presently no signs UTI, can monitor off abx  Hematuria improved with CBI  CT with thickening of urinary bladder  No signs of ongoing infection, mild RLQ pain with a benign abdomin  Suggest:  1. monitor off antibiotics  2.osorio per urology  3.Blood culture any fever spikes  4.No additional ID w/u planned at this point, we will stop actively following, please call if ID issues arise

## 2019-09-19 NOTE — PROGRESS NOTE ADULT - ASSESSMENT
89 yo male with Hx of CAD s/p PCI x2 on plavix, Atrial Fibrillation, HLD, HTN, Dementia , UTI was brought in the hospital from [facility] due to report of Stanislaw hematuria s/p CBI with noted Macrocytic Anemia with significant drop in Hemoglobin.  Patient with an episode of confusion which has improved after stopping cipro

## 2019-09-19 NOTE — SWALLOW BEDSIDE ASSESSMENT ADULT - PHARYNGEAL PHASE
Diagnosis: breast Cancer     Agent/Regimen:Ibrance    ECO - No physically strenuous activity, but ambulatory and able to carry out light or sedentary work.    Nursing Assessment: A focused nursing assessment addressing the toxicity of oral chemotherapy was performed and the patient reports the following:   Nausea: NO  Vomiting: NO  Fever: NO  Chills: NO  Other signs of infection: NO  Bleeding: NO  Mucositis: NO  Diarrhea: NO  Constipation: NO  Anorexia: NO  Dysuria: NO  Urinary Bleeding: NO  Cough: NO  Shortness of Breath: NO  Fatigue/Weakness: NO  Numbness/Tingling: NO  Other Neuropathies: NO  Edema: NO  Rash: NO  Hand/Foot Syndrome: NO  Anxiety/Depression/Insomnia: NO  Pain: NO        Treatment Plan: - Treatment consent signed  - Patient has valid pre-authorization  - Refer to Toxicity Assessment flowsheet    Adherence: Discussed oral chemo adherence with patient. Patient taking medication as prescribed.        Next appointment scheduled:     Patient instructed to call the office with any questions or concerns.         Within functional limits

## 2019-09-20 ENCOUNTER — TRANSCRIPTION ENCOUNTER (OUTPATIENT)
Age: 84
End: 2019-09-20

## 2019-09-20 LAB
HCT VFR BLD CALC: 28.8 % — LOW (ref 39–50)
HGB BLD-MCNC: 9.9 G/DL — LOW (ref 13–17)
MCHC RBC-ENTMCNC: 34.1 PG — HIGH (ref 27–34)
MCHC RBC-ENTMCNC: 34.3 GM/DL — SIGNIFICANT CHANGE UP (ref 32–36)
MCV RBC AUTO: 99.4 FL — SIGNIFICANT CHANGE UP (ref 80–100)
PLATELET # BLD AUTO: 202 K/UL — SIGNIFICANT CHANGE UP (ref 150–400)
RBC # BLD: 2.9 M/UL — LOW (ref 4.2–5.8)
RBC # FLD: 14.6 % — HIGH (ref 10.3–14.5)
WBC # BLD: 8.2 K/UL — SIGNIFICANT CHANGE UP (ref 3.8–10.5)
WBC # FLD AUTO: 8.2 K/UL — SIGNIFICANT CHANGE UP (ref 3.8–10.5)

## 2019-09-20 PROCEDURE — 74230 X-RAY XM SWLNG FUNCJ C+: CPT | Mod: 26

## 2019-09-20 RX ADMIN — Medication 1 TABLET(S): at 11:59

## 2019-09-20 RX ADMIN — RIVASTIGMINE 1 PATCH: 4.6 PATCH, EXTENDED RELEASE TRANSDERMAL at 21:32

## 2019-09-20 RX ADMIN — Medication 100 MILLIGRAM(S): at 21:35

## 2019-09-20 RX ADMIN — SODIUM CHLORIDE 40 MILLILITER(S): 9 INJECTION INTRAMUSCULAR; INTRAVENOUS; SUBCUTANEOUS at 21:34

## 2019-09-20 RX ADMIN — Medication 100 MILLIGRAM(S): at 15:30

## 2019-09-20 RX ADMIN — TAMSULOSIN HYDROCHLORIDE 0.4 MILLIGRAM(S): 0.4 CAPSULE ORAL at 21:36

## 2019-09-20 RX ADMIN — RIVASTIGMINE 1 PATCH: 4.6 PATCH, EXTENDED RELEASE TRANSDERMAL at 19:34

## 2019-09-20 RX ADMIN — RIVASTIGMINE 1 PATCH: 4.6 PATCH, EXTENDED RELEASE TRANSDERMAL at 08:00

## 2019-09-20 RX ADMIN — Medication 100 MILLIGRAM(S): at 06:20

## 2019-09-20 RX ADMIN — ATORVASTATIN CALCIUM 10 MILLIGRAM(S): 80 TABLET, FILM COATED ORAL at 21:36

## 2019-09-20 RX ADMIN — RIVASTIGMINE 1 PATCH: 4.6 PATCH, EXTENDED RELEASE TRANSDERMAL at 21:36

## 2019-09-20 RX ADMIN — FINASTERIDE 5 MILLIGRAM(S): 5 TABLET, FILM COATED ORAL at 11:59

## 2019-09-20 RX ADMIN — SENNA PLUS 2 TABLET(S): 8.6 TABLET ORAL at 21:35

## 2019-09-20 NOTE — DISCHARGE NOTE PROVIDER - NSDCCPCAREPLAN_GEN_ALL_CORE_FT
PRINCIPAL DISCHARGE DIAGNOSIS  Diagnosis: Hematuria  Assessment and Plan of Treatment: resolved,  continue osorio  follow up with urology after discharge from [facility]      SECONDARY DISCHARGE DIAGNOSES  Diagnosis: Essential hypertension  Assessment and Plan of Treatment: Essential hypertension  Follow up with your medical doctor to establish long term blood pressure treatment goals.      Diagnosis: Atrial fibrillation, unspecified type  Assessment and Plan of Treatment: Atrial fibrillation, unspecified type  Atrial fibrillation is the most common heart rhythm problem.  The condition puts you at risk for has stroke and heart attack  It helps if you control your blood pressure, not drink more than 1-2 alcohol drinks per day, cut down on caffeine, getting treatment for over active thyroid gland, and get regular exercise  Call your doctor if you feel your heart racing or beating unusually, chest tightness or pain, lightheaded, faint, shortness of breath especially with exercise  It is important to take your heart medication as prescribed  You may be on anticoagulation which is very important to take as directed - you may need blood work to monitor drug levels      Diagnosis: Dementia without behavioral disturbance, unspecified dementia type  Assessment and Plan of Treatment: Dementia without behavioral disturbance, unspecified dementia type  dysphagia diet  100% feeding supervision  crush all meds    Diagnosis: Coronary artery disease involving native coronary artery of native heart, angina presence unspecified  Assessment and Plan of Treatment: Coronary artery disease involving native coronary artery of native heart, angina presence unspecified  Coronary artery disease is a condition where the arteries the supply the heart muscle get clogges with fatty deposits & puts you at risk for a heart attack  Call your doctor if you have any new pain, pressure, or discomfort in the center of your chest, pain, tingling or discomfort in arms, back, neck, jaw, or stomach, shortness of breath, nausea, vomiting, burping or heartburn, sweating, cold and clammy skin, racing or abnormal heartbeat for more than 10 minutes or if they keep coming & going.  Call 911 and do not tr to get to hospital by care  You can help yourself with lefestyle changes (quitting smoking if you smoke), eat lots of fruits & vegetables & low fat dairy products, not a lot of meat & fatty foods, walk or some form of physical activity most days of the week, lose weight if you are overweight  Take your cardiac medication as prescribed to lower cholesterol, to lower blood pressure, aspirin to prevent blood clots, and diabetes control  Make sure to keep appointments with doctor for cardiac follow up care PRINCIPAL DISCHARGE DIAGNOSIS  Diagnosis: Hematuria  Assessment and Plan of Treatment: resolved  Mai removed - patient voiding.  Continue to monitor strict intake and output and notify MD immediately of any decrease in urine output.  follow up with urology after discharge from   Plavix on hold for now due to hematuria.  Patient will need to follow up with PMD and Urology regarding when to restart.      SECONDARY DISCHARGE DIAGNOSES  Diagnosis: Dementia without behavioral disturbance, unspecified dementia type  Assessment and Plan of Treatment: Dementia without behavioral disturbance, unspecified dementia type  dysphagia diet  100% feeding supervision  crush all meds    Diagnosis: Atrial fibrillation, unspecified type  Assessment and Plan of Treatment: Atrial fibrillation, unspecified type  Atrial fibrillation is the most common heart rhythm problem.  The condition puts you at risk for has stroke and heart attack  It helps if you control your blood pressure, not drink more than 1-2 alcohol drinks per day, cut down on caffeine, getting treatment for over active thyroid gland, and get regular exercise  Call your doctor if you feel your heart racing or beating unusually, chest tightness or pain, lightheaded, faint, shortness of breath especially with exercise  It is important to take your heart medication as prescribed  You may be on anticoagulation which is very important to take as directed - you may need blood work to monitor drug levels      Diagnosis: Essential hypertension  Assessment and Plan of Treatment: Your Metoprolol was stopped secondary to low heart rate.  Follow up with your medical doctor within one week to establish long term blood pressure treatment goals.  Low salt diet  Activity as tolerated.  Take all medication as prescribed.  Follow up with your medical doctor for routine blood pressure monitoring at your next visit.  Notify your doctor if you have any of the following symptoms:   Dizziness, Lightheadedness, Blurry vision, Headache, Chest pain, Shortness of breath      Diagnosis: Coronary artery disease involving native coronary artery of native heart, angina presence unspecified  Assessment and Plan of Treatment: Coronary artery disease involving native coronary artery of native heart, angina presence unspecified  Coronary artery disease is a condition where the arteries the supply the heart muscle get clogges with fatty deposits & puts you at risk for a heart attack  Call your doctor if you have any new pain, pressure, or discomfort in the center of your chest, pain, tingling or discomfort in arms, back, neck, jaw, or stomach, shortness of breath, nausea, vomiting, burping or heartburn, sweating, cold and clammy skin, racing or abnormal heartbeat for more than 10 minutes or if they keep coming & going.  Call 911 and do not tr to get to hospital by care  You can help yourself with lefestyle changes (quitting smoking if you smoke), eat lots of fruits & vegetables & low fat dairy products, not a lot of meat & fatty foods, walk or some form of physical activity most days of the week, lose weight if you are overweight  Take your cardiac medication as prescribed to lower cholesterol, to lower blood pressure, aspirin to prevent blood clots, and diabetes control  Make sure to keep appointments with doctor for cardiac follow up care PRINCIPAL DISCHARGE DIAGNOSIS  Diagnosis: Hematuria  Assessment and Plan of Treatment: resolved  Osorio removed - PVR showing >249 - osorio replaced.  Continue to monitor strict intake and output and notify MD immediately of any decrease in urine output.    Please attempt another trial of void at   follow up with urology after discharge from   Plavix on hold for now due to hematuria.  Patient will need to follow up with PMD and Urology regarding when to restart.      SECONDARY DISCHARGE DIAGNOSES  Diagnosis: Dementia without behavioral disturbance, unspecified dementia type  Assessment and Plan of Treatment: Dementia without behavioral disturbance, unspecified dementia type  dysphagia diet  100% feeding supervision  crush all meds    Diagnosis: Atrial fibrillation, unspecified type  Assessment and Plan of Treatment: Atrial fibrillation, unspecified type  Atrial fibrillation is the most common heart rhythm problem.  The condition puts you at risk for has stroke and heart attack  It helps if you control your blood pressure, not drink more than 1-2 alcohol drinks per day, cut down on caffeine, getting treatment for over active thyroid gland, and get regular exercise  Call your doctor if you feel your heart racing or beating unusually, chest tightness or pain, lightheaded, faint, shortness of breath especially with exercise  It is important to take your heart medication as prescribed  You may be on anticoagulation which is very important to take as directed - you may need blood work to monitor drug levels      Diagnosis: Essential hypertension  Assessment and Plan of Treatment: Your Metoprolol was stopped secondary to low heart rate.  Follow up with your medical doctor within one week to establish long term blood pressure treatment goals.  Low salt diet  Activity as tolerated.  Take all medication as prescribed.  Follow up with your medical doctor for routine blood pressure monitoring at your next visit.  Notify your doctor if you have any of the following symptoms:   Dizziness, Lightheadedness, Blurry vision, Headache, Chest pain, Shortness of breath      Diagnosis: Coronary artery disease involving native coronary artery of native heart, angina presence unspecified  Assessment and Plan of Treatment: Coronary artery disease involving native coronary artery of native heart, angina presence unspecified  Coronary artery disease is a condition where the arteries the supply the heart muscle get clogges with fatty deposits & puts you at risk for a heart attack  Call your doctor if you have any new pain, pressure, or discomfort in the center of your chest, pain, tingling or discomfort in arms, back, neck, jaw, or stomach, shortness of breath, nausea, vomiting, burping or heartburn, sweating, cold and clammy skin, racing or abnormal heartbeat for more than 10 minutes or if they keep coming & going.  Call 911 and do not tr to get to hospital by care  You can help yourself with lefestyle changes (quitting smoking if you smoke), eat lots of fruits & vegetables & low fat dairy products, not a lot of meat & fatty foods, walk or some form of physical activity most days of the week, lose weight if you are overweight  Take your cardiac medication as prescribed to lower cholesterol, to lower blood pressure, aspirin to prevent blood clots, and diabetes control  Make sure to keep appointments with doctor for cardiac follow up care

## 2019-09-20 NOTE — DISCHARGE NOTE PROVIDER - NSDCFUADDAPPT_GEN_ALL_CORE_FT
You must follow up with your urologist, Dr. Valencia, [phone], within one week of discharge - please call to make an appointment. You must follow up with your urologist, Dr. Valencia, [phone], within one week of discharge - please call to make an appointment.  At this time, you will discuss if/when to restart your plavix.    You will need to follow up with your primary medical doctor within one week of discharge - please call to make an appointment.  At this time, you will discuss your current medication regimen.

## 2019-09-20 NOTE — PROGRESS NOTE ADULT - ASSESSMENT
89 yo male with Hx of CAD s/p PCI x2 on plavix, Atrial Fibrillation, HLD, HTN, Dementia , UTI was brought [facility] due to report of Stanislaw hematuria s/p CBI with noted Macrocytic Anemia with significant drop in Hemoglobin.  Patient with an episode of confusion which has improved after stopping cipro.  More alert now.

## 2019-09-20 NOTE — SWALLOW VFSS/MBS ASSESSMENT ADULT - COMMENTS
(ID) 9/15: ID consulted, recommended to monitor symptoms off abx. Pt with mild confusion. Psych consult considered. Less confused off abx. 9/18: ID f/u reveals no signs of ongoing infection. Continue to monitor off abx. BCX if fever spikes.  Per medicine, anemia. Transfused 1PRBC. (CT A/P) 9/17: Cystitis. Urology following, urine clear. CT Urogram shows cystitis. F/u cultures. Pt with episode of coughing leading to min vomiting, requiring suctioning, followed by additional overt signs of possible aspiration with thin liquids and meds with applesauce. Made NPO. Pt mentating at baseline, not desaturating not in respiratory distress. Patient has been worked up during day for aspiration PNA. CXR: clear lungs. Pt afebrile. WBC WNL.  Passed Dysphagia screen in ED on 9/13. Per nursing staff, patient tolerated puree diet without difficulties from 9/14 to 9/18.

## 2019-09-20 NOTE — PROGRESS NOTE ADULT - SUBJECTIVE AND OBJECTIVE BOX
89 yo male with Hx of CAD s/p PCI x2 as reported, TIA on plavix, Atrial Fibrillation on no AC , HLD, HTN, Dementia , UTI was brought in the hospital [facility] due to report of Ghada hematuria.  AS per daughter, pt has recurrennt UTI and was recently seen by Urologist who prescribed Monural for 3 doses , patient received 2 doses so far and started having the ghada hematuria.  Pt started having ghada hematuria started 2-3 days prior to admission  with no fever or reported abd pain.  As per daughter, patient was in hosp in March or April 2019 at another hospital for UTI and was dc to [facility] and subsequently dc to home.  As per PMD daughter reported abd distension.  A CBI was placed in the ED with reported of about one Liter of urine with Blood and clots. Bleeding has now stopped and the patient is to have a ct of abdomen and pelvis with contrast to better determine the etiology of his hematuria.  Patient states he  is comfortable. Patient less confused today. Patient with issues with swallowing this am. Speech and swallow seeing Patient.  Placed on  dysphagia diet.  Seen by PT [facility] recommended.    MEDICATIONS  (STANDING):  atorvastatin 10 milliGRAM(s) Oral at bedtime  calcium carbonate 1250 mG  + Vitamin D (OsCal 500 + D) 1 Tablet(s) Oral daily  docusate sodium 100 milliGRAM(s) Oral three times a day  finasteride 5 milliGRAM(s) Oral daily  multivitamin 1 Tablet(s) Oral daily  rivastigmine patch  9.5 mG/24 Hr(s) 1 Patch Transdermal every 24 hours  senna 2 Tablet(s) Oral at bedtime  tamsulosin 0.4 milliGRAM(s) Oral at bedtime    MEDICATIONS  (PRN):  acetaminophen    Suspension .. 650 milliGRAM(s) Oral every 4 hours PRN Mild Pain (1 - 3)  ondansetron Injectable 4 milliGRAM(s) IV Push every 6 hours PRN Nausea  polyethylene glycol 3350 17 Gram(s) Oral at bedtime PRN Constipation          VITALS:   T(C): 36.5 (09-19-19 @ 15:06), Max: 36.6 (09-19-19 @ 04:36)  HR: 70 (09-19-19 @ 15:06) (64 - 87)  BP: 121/71 (09-19-19 @ 15:06) (103/65 - 148/75)  RR: 18 (09-19-19 @ 15:06) (18 - 18)  SpO2: 97% (09-19-19 @ 15:06) (96% - 100%)  Wt(kg): --    PHYSICAL EXAM:  GENERAL: NAD, well nourished and conversant  HEAD:  Atraumatic  EYES: EOM, PERRLA, conjunctiva pink and sclera white  ENT: No tonsillar erythema, exudates, or enlargement, moist mucous membranes, good dentition, no lesions  NECK: Supple, No JVD, normal thyroid, carotids with normal upstrokes and no bruits  CHEST/LUNG: Clear to auscultation bilaterally, No rales, rhonchi, wheezing, or rubs  HEART: Regular rate and rhythm, No murmurs, rubs, or gallops  ABDOMEN: Soft, nondistended, no masses, guarding, tenderness or rebound, bowel sounds present  EXTREMITIES:  2+ Peripheral Pulses, No clubbing, cyanosis, or edema. s/p re[aojf pf ;ef  LYMPH: No lymphadenopathy noted  SKIN: No rashes or lesions  NERVOUS SYSTEM:  Alert & Oriented . Motor Strength 5/5 right upper and right lower.  5/5 left upper and left lower extremities, DTRs 2+ intact and symmetric    LABS:          CBC Full  -  ( 20 Sep 2019 06:49 )  WBC Count : 8.2 K/uL  RBC Count : 2.90 M/uL  Hemoglobin : 9.9 g/dL  Hematocrit : 28.8 %  Platelet Count - Automated : 202 K/uL  Mean Cell Volume : 99.4 fl  Mean Cell Hemoglobin : 34.1 pg  Mean Cell Hemoglobin Concentration : 34.3 gm/dL  Auto Neutrophil # : x  Auto Lymphocyte # : x  Auto Monocyte # : x  Auto Eosinophil # : x  Auto Basophil # : x  Auto Neutrophil % : x  Auto Lymphocyte % : x  Auto Monocyte % : x  Auto Eosinophil % : x  Auto Basophil % : x      CBC Full  -  ( 19 Sep 2019 08:33 )  WBC Count : 9.22 K/uL  RBC Count : 2.63 M/uL  Hemoglobin : 8.4 g/dL  Hematocrit : 26.0 %  Platelet Count - Automated : 175 K/uL  Mean Cell Volume : 98.9 fl  Mean Cell Hemoglobin : 31.9 pg  Mean Cell Hemoglobin Concentration : 32.3 gm/dL  Auto Neutrophil # : x  Auto Lymphocyte # : x  Auto Monocyte # : x  Auto Eosinophil # : x  Auto Basophil # : x  Auto Neutrophil % : x  Auto Lymphocyte % : x  Auto Monocyte % : x  Auto Eosinophil % : x  Auto Basophil % : x                CAPILLARY BLOOD GLUCOSE          RADIOLOGY & ADDITIONAL TESTS:

## 2019-09-20 NOTE — SWALLOW VFSS/MBS ASSESSMENT ADULT - ADDITIONAL RECOMMENDATIONS
Maintain good oral hygiene.  This service will continue to follow to monitor tolerance of recommended diet.

## 2019-09-20 NOTE — SWALLOW VFSS/MBS ASSESSMENT ADULT - ASPIRATION PRECAUTIONS
Monitor for s/s aspiration/laryngeal penetration. If noted:  D/C p.o. intake, provide non-oral nutrition/hydration/meds, and contact this service @ [id number]

## 2019-09-20 NOTE — SWALLOW VFSS/MBS ASSESSMENT ADULT - ORAL PHASE
Uncontrolled bolus / spillover in bryce-pharynx/Incomplete tongue to palate contact/mild-moderate to the valleculae Delayed oral transit time Incomplete tongue to palate contact/Uncontrolled bolus / spillover in hypopharynx/mild-moderate to the valleculae and pyriform sinuses/Uncontrolled bolus / spillover in bryce-pharynx

## 2019-09-20 NOTE — SWALLOW VFSS/MBS ASSESSMENT ADULT - SLP PRECAUTIONS/LIMITATIONS: HEARING
Mildly to Moderately Impaired: difficulty hearing in some environments or speaker may need to increase volume or speak distinctly/impaired

## 2019-09-20 NOTE — DISCHARGE NOTE PROVIDER - CARE PROVIDER_API CALL
Jimmy Franco)  Internal Medicine  [address] [place] [address]  Phone: [phone]  Fax: [phone]  Follow Up Time:     Charles Valencia)  Urology  [address] [place] [address], [facility] [address]  [place] [address]  Phone: [phone]  Fax: [phone]  Follow Up Time:

## 2019-09-20 NOTE — SWALLOW VFSS/MBS ASSESSMENT ADULT - RESIDUE IN VALLECULAE
Trace Mild Trace/mild-moderate with spillover of post swallow oral cavity residue, reduced to trace with spontaneous repeat swallow

## 2019-09-20 NOTE — SWALLOW VFSS/MBS ASSESSMENT ADULT - MODE OF PRESENTATION
spoon/fed by clinician Further PO trials attempted however pt with persistent PO refusal despite maximal encouragement. fed by clinician/spoon self fed/cup

## 2019-09-20 NOTE — SWALLOW VFSS/MBS ASSESSMENT ADULT - SLP PERTINENT HISTORY OF CURRENT PROBLEM
89 y/o male with Hx of CAD s/p PCI x2 as reported, TIA on plavix, Atrial Fibrillation on no AC, HLD, HTN, dementia, UTI. [facility] report of ghada hematuria. As per daughter, Pt has recurrent UTI and was recently seen by urologist who prescribed Monural for 3 doses, patient received 2 doses so far and started having the ghada hematuria. Hematuria started 2-3 days ago with no fever or reported abd pain. As per daughter, patient was in OSH in March or April 2019 for UTI and was dc to [facility] and subsequently dc to home. As per PMD, daughter reported abd distension. A CBI was placed in the ED with reported of about one Liter of urine with blood and clots.

## 2019-09-20 NOTE — SWALLOW VFSS/MBS ASSESSMENT ADULT - ORAL PHASE COMMENTS
Trace silent laryngeal penetration before the swallow, over the laryngeal surface of the epiglottis with retrieval.

## 2019-09-20 NOTE — SWALLOW VFSS/MBS ASSESSMENT ADULT - DIAGNOSTIC IMPRESSIONS
Exam limited as Pt with episodes of PO refusal during exam (suspect behavioral in nature, ? related to confusion).  Given above, Pt presents with an oropharyngeal dysphagia characterized by delayed oral transit time, uncontrolled loss, reduced A-P transit with post-swallow oral cavity residue (notable with thin liquid) with spillover to the valleculae and pyriform sinuses, and silent laryngeal penetration on thin liquids and thin purees. No aspiration observed on limited exam.  Disorders: Reduced lingual strength/ROM, reduced tongue to palate contact, delayed pharyngeal swallow, reduced hyolaryngeal excursion, signs of reduced supraglottic sensation.

## 2019-09-20 NOTE — SWALLOW VFSS/MBS ASSESSMENT ADULT - RESIDUE IN PYRIFORM SINUSES
Trace Trace/mild-moderate with spillover of post swallow oral cavity residue, reduced to trace with spontaneous repeat swallow

## 2019-09-20 NOTE — DISCHARGE NOTE PROVIDER - NSDCFUADDINST_GEN_ALL_CORE_FT
follow up with urology after discharge from [facility] in place secondary to urinary retention.  Please attempt another trial of void at [facility].

## 2019-09-20 NOTE — SWALLOW VFSS/MBS ASSESSMENT ADULT - RECOMMENDED FEEDING/EATING TECHNIQUES
allow for swallow between intakes/maintain upright posture during/after eating for 30 mins/ONLY FEED WHEN AWAKE AND ALERT, Feed slowly/position upright (90 degrees)/no straws/small sips/bites

## 2019-09-20 NOTE — SWALLOW VFSS/MBS ASSESSMENT ADULT - SLP GENERAL OBSERVATIONS
Pt arrived to Radiology secured in LELAND chair. Presented with paucity of verbal output. Grossly oriented X2 with choice of 2. As exam progressed, Pt appeared mildly more confused. Pt returned to floor with SLP immediately. RN Bella and NP Leonides alerted of Pt status.

## 2019-09-20 NOTE — DISCHARGE NOTE PROVIDER - HOSPITAL COURSE
89 yo male with Hx of CAD s/p PCI x2 as reported, TIA on plavix, Atrial Fibrillation on no AC , HLD, HTN, Dementia , UTI was [facility] the [facility] due to report of Ghada hematuria.  AS per daughter, pt has recurrennt UTI and was recently seen by Urologist who prescribed Monural for 3 doses , patient received 2 doses so far and started having the ghada hematuria.  Pt started having ghada hematuria started 2-3 days prior to admission  with no fever or reported abd pain.  As per daughter, patient was in hosp in March or April 2019 at another hospital for UTI and was dc to [facility] and subsequently dc to home.  As per PMD daughter reported abd distension.  A CBI was placed in the ED with reported of about one Liter of urine with Blood and clots. Bleeding has now stopped and the patient is to have a ct of abdomen and pelvis with contrast to better determine the etiology of his hematuria.  Patient states he  is comfortable. Patient less confused today. Patient with issues with swallowing this am. Speech and swallow seeing Patient.     · Recommended Consistencies	Based on limited exam 2/2 PO refusal, Pt appears safe to tolerate puree-texture diet with thin liquids with 100% assistance and supervision.    · Recommended Feeding/Eating Techniques	allow for swallow between intakes; maintain upright posture during/after eating for 30 mins; no straws; position upright (90 degrees); small sips/bites; ONLY FEED WHEN AWAKE AND ALERT, Feed slowly    Pt to be discharged with osorio and follow up with urology after discharge 89 yo male with Hx of CAD s/p PCI x2 as reported, TIA on plavix, Atrial Fibrillation on no AC , HLD, HTN, Dementia , UTI was brought in the hospital [facility] due to report of Ghada hematuria.  AS per daughter, pt has recurrennt UTI and was recently seen by Urologist who prescribed Monural for 3 doses , patient received 2 doses so far and started having the ghada hematuria.  Pt started having ghada hematuria started 2-3 days prior to admission  with no fever or reported abd pain.  As per daughter, patient was in hosp in March or April 2019 at another hospital for UTI and was dc to [facility] and subsequently dc to home.  As per PMD daughter reported abd distension.  A CBI was placed in the ED with reported of about one Liter of urine with Blood and clots. Bleeding has now stopped and the patient is to have a ct of abdomen and pelvis with contrast to better determine the etiology of his hematuria.  Patient states he  is comfortable. Patient less confused today. Patient with issues with swallowing this am. Speech and swallow seeing Patient.     · Recommended Consistencies	Based on limited exam 2/2 PO refusal, Pt appears safe to tolerate puree-texture diet with thin liquids with 100% assistance and supervision.    · Recommended Feeding/Eating Technique allow for swallow between intakes; maintain upright posture during/after eating for 30 mins; no straws; position upright (90 degrees); small sips/bites; ONLY FEED WHEN AWAKE AND ALERT, Feed slowly         Patient was placed on CBI during hospitalization for hematuria.  Urology consulted.  Mai removed and patient voiding.  No cystoscopy done during admission - patient will need to follow up with urology as outpatient.        Treated with Cipro for UTI - stopped after patient developed hallucinations - mental status improved, hallucinations resolved.  No further need for antibiotics.        Stable for discharge to [facility] with Urology and PMD follow up. 89 yo male with Hx of CAD s/p PCI x2 as reported, TIA on plavix, Atrial Fibrillation on no AC , HLD, HTN, Dementia , UTI was brought in the hospital [facility] due to report of Ghada hematuria.  AS per daughter, pt has recurrennt UTI and was recently seen by Urologist who prescribed Monural for 3 doses , patient received 2 doses so far and started having the ghada hematuria.  Pt started having ghada hematuria started 2-3 days prior to admission  with no fever or reported abd pain.  As per daughter, patient was in hosp in March or April 2019 at another hospital for UTI and was dc to [facility] and subsequently dc to home.  As per PMD daughter reported abd distension.  A CBI was placed in the ED with reported of about one Liter of urine with Blood and clots. Bleeding has now stopped and the patient is to have a ct of abdomen and pelvis with contrast to better determine the etiology of his hematuria.  Patient states he  is comfortable. Patient less confused today. Patient with issues with swallowing this am. Speech and swallow seeing Patient.     · Recommended Consistencies	Based on limited exam 2/2 PO refusal, Pt appears safe to tolerate puree-texture diet with thin liquids with 100% assistance and supervision.    · Recommended Feeding/Eating Technique allow for swallow between intakes; maintain upright posture during/after eating for 30 mins; no straws; position upright (90 degrees); small sips/bites; ONLY FEED WHEN AWAKE AND ALERT, Feed slowly         Patient was placed on CBI during hospitalization for hematuria.  Urology consulted.  Osorio removed but patient found to have PVR of >249cc or urine - osorio replaced.  No cystoscopy done during admission - patient will need to follow up with urology as outpatient.  Another TOV to be attempted at [facility].        Treated with Cipro for UTI - stopped after patient developed hallucinations - mental status improved, hallucinations resolved.  No further need for antibiotics.        Stable for discharge to [facility] with Urology and PMD follow up.

## 2019-09-20 NOTE — SWALLOW VFSS/MBS ASSESSMENT ADULT - RECOMMENDED CONSISTENCY
Based on limited exam 2/2 PO refusal, Pt appears safe to tolerate puree-texture diet with thin liquids with 100% assistance and supervision.

## 2019-09-21 RX ADMIN — SENNA PLUS 2 TABLET(S): 8.6 TABLET ORAL at 22:47

## 2019-09-21 RX ADMIN — Medication 1 TABLET(S): at 14:04

## 2019-09-21 RX ADMIN — Medication 100 MILLIGRAM(S): at 22:48

## 2019-09-21 RX ADMIN — Medication 650 MILLIGRAM(S): at 22:48

## 2019-09-21 RX ADMIN — RIVASTIGMINE 1 PATCH: 4.6 PATCH, EXTENDED RELEASE TRANSDERMAL at 21:00

## 2019-09-21 RX ADMIN — Medication 100 MILLIGRAM(S): at 05:58

## 2019-09-21 RX ADMIN — TAMSULOSIN HYDROCHLORIDE 0.4 MILLIGRAM(S): 0.4 CAPSULE ORAL at 22:47

## 2019-09-21 RX ADMIN — RIVASTIGMINE 1 PATCH: 4.6 PATCH, EXTENDED RELEASE TRANSDERMAL at 22:48

## 2019-09-21 RX ADMIN — RIVASTIGMINE 1 PATCH: 4.6 PATCH, EXTENDED RELEASE TRANSDERMAL at 19:27

## 2019-09-21 RX ADMIN — Medication 1 TABLET(S): at 14:03

## 2019-09-21 RX ADMIN — SODIUM CHLORIDE 40 MILLILITER(S): 9 INJECTION INTRAMUSCULAR; INTRAVENOUS; SUBCUTANEOUS at 18:01

## 2019-09-21 RX ADMIN — FINASTERIDE 5 MILLIGRAM(S): 5 TABLET, FILM COATED ORAL at 14:04

## 2019-09-21 RX ADMIN — Medication 100 MILLIGRAM(S): at 14:05

## 2019-09-21 RX ADMIN — Medication 650 MILLIGRAM(S): at 23:15

## 2019-09-21 RX ADMIN — ATORVASTATIN CALCIUM 10 MILLIGRAM(S): 80 TABLET, FILM COATED ORAL at 22:47

## 2019-09-21 NOTE — PROGRESS NOTE ADULT - ASSESSMENT
89 yo male with Hx of CAD s/p PCI x2 on plavix, Atrial Fibrillation, HLD, HTN, Dementia , UTI was brought in the hospital [facility] due to report of Stanislaw hematuria s/p CBI with noted Macrocytic Anemia with significant drop in Hemoglobin.  Patient with an episode of confusion which has improved after stopping cipro.  More alert now. Needs PT and eventually rehab

## 2019-09-21 NOTE — PROGRESS NOTE ADULT - SUBJECTIVE AND OBJECTIVE BOX
89 yo male with Hx of CAD s/p PCI x2 as reported, TIA on plavix, Atrial Fibrillation on no AC , HLD, HTN, Dementia , UTI was brought in the hospital [facility] due to report of Ghada hematuria.  AS per daughter, pt has recurrennt UTI and was recently seen by Urologist who prescribed Monural for 3 doses , patient received 2 doses so far and started having the ghada hematuria.  Pt started having ghada hematuria started 2-3 days prior to admission  with no fever or reported abd pain.  As per daughter, patient was in hosp in March or April 2019 at another hospital for UTI and was dc to [facility] and subsequently dc to home.  As per PMD daughter reported abd distension.  A CBI was placed in the ED with reported of about one Liter of urine with Blood and clots. Bleeding has now stopped and the patient is to have a ct of abdomen and pelvis with contrast to better determine the etiology of his hematuria.  Patient states he  is comfortable. Patient less confused today. Patient with issues with swallowing this am. Speech and swallow seeing Patient.  Placed on  dysphagia diet.  Seen by PT [facility] recommended. mental status waxes and wanes depending on the time of he day    MEDICATIONS  (STANDING):  atorvastatin 10 milliGRAM(s) Oral at bedtime  calcium carbonate 1250 mG  + Vitamin D (OsCal 500 + D) 1 Tablet(s) Oral daily  docusate sodium 100 milliGRAM(s) Oral three times a day  finasteride 5 milliGRAM(s) Oral daily  multivitamin 1 Tablet(s) Oral daily  rivastigmine patch  9.5 mG/24 Hr(s) 1 Patch Transdermal every 24 hours  senna 2 Tablet(s) Oral at bedtime  tamsulosin 0.4 milliGRAM(s) Oral at bedtime    MEDICATIONS  (PRN):  acetaminophen    Suspension .. 650 milliGRAM(s) Oral every 4 hours PRN Mild Pain (1 - 3)  ondansetron Injectable 4 milliGRAM(s) IV Push every 6 hours PRN Nausea  polyethylene glycol 3350 17 Gram(s) Oral at bedtime PRN Constipation          VITALS:   T(C): 36.5   HR: 74  BP: 121/71   RR: 18   SpO2: 97%         PHYSICAL EXAM:  GENERAL: NAD, well nourished and conversant  HEAD:  Atraumatic  EYES: EOM, PERRLA, conjunctiva pink and sclera white  ENT: No tonsillar erythema, exudates, or enlargement, moist mucous membranes, good dentition, no lesions  NECK: Supple, No JVD, normal thyroid, carotids with normal upstrokes and no bruits  CHEST/LUNG: Clear to auscultation bilaterally, No rales, rhonchi, wheezing, or rubs  HEART: Regular rate and rhythm, No murmurs, rubs, or gallops  ABDOMEN: Soft, nondistended, no masses, guarding, tenderness or rebound, bowel sounds present  EXTREMITIES:  2+ Peripheral Pulses, No clubbing, cyanosis, or edema.   LYMPH: No lymphadenopathy noted  SKIN: No rashes or lesions  NERVOUS SYSTEM:  Alert & Oriented . Motor Strength 5/5 right upper and right lower.  5/5 left upper and left lower extremities, DTRs 2+ intact and symmetric    LABS:              CBC Full  -  ( 20 Sep 2019 06:49 )  WBC Count : 8.2 K/uL  RBC Count : 2.90 M/uL  Hemoglobin : 9.9 g/dL  Hematocrit : 28.8 %  Platelet Count - Automated : 202 K/uL  Mean Cell Volume : 99.4 fl  Mean Cell Hemoglobin : 34.1 pg  Mean Cell Hemoglobin Concentration : 34.3 gm/dL  Auto Neutrophil # : x  Auto Lymphocyte # : x  Auto Monocyte # : x  Auto Eosinophil # : x  Auto Basophil # : x  Auto Neutrophil % : x  Auto Lymphocyte % : x  Auto Monocyte % : x  Auto Eosinophil % : x  Auto Basophil % : x      CBC Full  -  ( 19 Sep 2019 08:33 )  WBC Count : 9.22 K/uL  RBC Count : 2.63 M/uL  Hemoglobin : 8.4 g/dL  Hematocrit : 26.0 %  Platelet Count - Automated : 175 K/uL  Mean Cell Volume : 98.9 fl  Mean Cell Hemoglobin : 31.9 pg  Mean Cell Hemoglobin Concentration : 32.3 gm/dL  Auto Neutrophil # : x  Auto Lymphocyte # : x  Auto Monocyte # : x  Auto Eosinophil # : x  Auto Basophil # : x  Auto Neutrophil % : x  Auto Lymphocyte % : x  Auto Monocyte % : x  Auto Eosinophil % : x  Auto Basophil % : x                CAPILLARY BLOOD GLUCOSE          RADIOLOGY & ADDITIONAL TESTS:

## 2019-09-22 LAB
ANION GAP SERPL CALC-SCNC: 10 MMOL/L — SIGNIFICANT CHANGE UP (ref 5–17)
BUN SERPL-MCNC: 18 MG/DL — SIGNIFICANT CHANGE UP (ref 7–23)
CALCIUM SERPL-MCNC: 8.6 MG/DL — SIGNIFICANT CHANGE UP (ref 8.4–10.5)
CHLORIDE SERPL-SCNC: 105 MMOL/L — SIGNIFICANT CHANGE UP (ref 96–108)
CO2 SERPL-SCNC: 23 MMOL/L — SIGNIFICANT CHANGE UP (ref 22–31)
CREAT SERPL-MCNC: 1.1 MG/DL — SIGNIFICANT CHANGE UP (ref 0.5–1.3)
GLUCOSE SERPL-MCNC: 101 MG/DL — HIGH (ref 70–99)
HCT VFR BLD CALC: 25.2 % — LOW (ref 39–50)
HGB BLD-MCNC: 8.2 G/DL — LOW (ref 13–17)
MCHC RBC-ENTMCNC: 31.7 PG — SIGNIFICANT CHANGE UP (ref 27–34)
MCHC RBC-ENTMCNC: 32.5 GM/DL — SIGNIFICANT CHANGE UP (ref 32–36)
MCV RBC AUTO: 97.3 FL — SIGNIFICANT CHANGE UP (ref 80–100)
PLATELET # BLD AUTO: 225 K/UL — SIGNIFICANT CHANGE UP (ref 150–400)
POTASSIUM SERPL-MCNC: 3.9 MMOL/L — SIGNIFICANT CHANGE UP (ref 3.5–5.3)
POTASSIUM SERPL-SCNC: 3.9 MMOL/L — SIGNIFICANT CHANGE UP (ref 3.5–5.3)
RBC # BLD: 2.59 M/UL — LOW (ref 4.2–5.8)
RBC # FLD: 15.2 % — HIGH (ref 10.3–14.5)
SODIUM SERPL-SCNC: 138 MMOL/L — SIGNIFICANT CHANGE UP (ref 135–145)
WBC # BLD: 6.1 K/UL — SIGNIFICANT CHANGE UP (ref 3.8–10.5)
WBC # FLD AUTO: 6.1 K/UL — SIGNIFICANT CHANGE UP (ref 3.8–10.5)

## 2019-09-22 RX ADMIN — SODIUM CHLORIDE 40 MILLILITER(S): 9 INJECTION INTRAMUSCULAR; INTRAVENOUS; SUBCUTANEOUS at 11:27

## 2019-09-22 RX ADMIN — SENNA PLUS 2 TABLET(S): 8.6 TABLET ORAL at 21:42

## 2019-09-22 RX ADMIN — RIVASTIGMINE 1 PATCH: 4.6 PATCH, EXTENDED RELEASE TRANSDERMAL at 07:00

## 2019-09-22 RX ADMIN — Medication 100 MILLIGRAM(S): at 11:27

## 2019-09-22 RX ADMIN — RIVASTIGMINE 1 PATCH: 4.6 PATCH, EXTENDED RELEASE TRANSDERMAL at 20:11

## 2019-09-22 RX ADMIN — Medication 100 MILLIGRAM(S): at 06:45

## 2019-09-22 RX ADMIN — TAMSULOSIN HYDROCHLORIDE 0.4 MILLIGRAM(S): 0.4 CAPSULE ORAL at 21:42

## 2019-09-22 RX ADMIN — SODIUM CHLORIDE 40 MILLILITER(S): 9 INJECTION INTRAMUSCULAR; INTRAVENOUS; SUBCUTANEOUS at 21:40

## 2019-09-22 RX ADMIN — Medication 1 TABLET(S): at 11:27

## 2019-09-22 RX ADMIN — Medication 100 MILLIGRAM(S): at 21:41

## 2019-09-22 RX ADMIN — RIVASTIGMINE 1 PATCH: 4.6 PATCH, EXTENDED RELEASE TRANSDERMAL at 21:41

## 2019-09-22 RX ADMIN — ATORVASTATIN CALCIUM 10 MILLIGRAM(S): 80 TABLET, FILM COATED ORAL at 21:42

## 2019-09-22 RX ADMIN — FINASTERIDE 5 MILLIGRAM(S): 5 TABLET, FILM COATED ORAL at 11:27

## 2019-09-22 RX ADMIN — RIVASTIGMINE 1 PATCH: 4.6 PATCH, EXTENDED RELEASE TRANSDERMAL at 22:48

## 2019-09-22 NOTE — PROGRESS NOTE ADULT - ASSESSMENT
89 yo male with Hx of CAD s/p PCI x2 on plavix, Atrial Fibrillation, HLD, HTN, Dementia , UTI was brought in the hospital [facility] due to report of Stanislaw hematuria s/p CBI with noted Macrocytic Anemia with significant drop in Hemoglobin.  Patient with an episode of confusion which has improved after stopping cipro.  More alert now. Needs PT and eventually rehab.  Repeat H/H as it is dropping.

## 2019-09-22 NOTE — PROGRESS NOTE ADULT - SUBJECTIVE AND OBJECTIVE BOX
89 yo male with Hx of CAD s/p PCI x2 as reported, TIA on plavix, Atrial Fibrillation on no AC , HLD, HTN, Dementia , UTI was brought in the hospital from [facility] due to report of Ghada hematuria.  AS per daughter, pt has recurrennt UTI and was recently seen by Urologist who prescribed Monural for 3 doses , patient received 2 doses so far and started having the ghada hematuria.  Pt started having ghada hematuria started 2-3 days prior to admission  with no fever or reported abd pain.  As per daughter, patient was in hosp in March or April 2019 at another hospital for UTI and was dc to [facility] and subsequently dc to home.  As per PMD daughter reported abd distension.  A CBI was placed in the ED with reported of about one Liter of urine with Blood and clots. Bleeding has now stopped and the patient is to have a ct of abdomen and pelvis with contrast to better determine the etiology of his hematuria.  Patient states he  is comfortable. Patient less confused today. Patient with issues with swallowing this am. Speech and swallow seeing Patient.  Placed on  dysphagia diet.  Seen by PT [facility]hab recommended. mental status waxes and wanes depending on the time of he day    MEDICATIONS  (STANDING):  atorvastatin 10 milliGRAM(s) Oral at bedtime  calcium carbonate 1250 mG  + Vitamin D (OsCal 500 + D) 1 Tablet(s) Oral daily  docusate sodium 100 milliGRAM(s) Oral three times a day  finasteride 5 milliGRAM(s) Oral daily  multivitamin 1 Tablet(s) Oral daily  rivastigmine patch  9.5 mG/24 Hr(s) 1 Patch Transdermal every 24 hours  senna 2 Tablet(s) Oral at bedtime  tamsulosin 0.4 milliGRAM(s) Oral at bedtime    MEDICATIONS  (PRN):  acetaminophen    Suspension .. 650 milliGRAM(s) Oral every 4 hours PRN Mild Pain (1 - 3)  ondansetron Injectable 4 milliGRAM(s) IV Push every 6 hours PRN Nausea  polyethylene glycol 3350 17 Gram(s) Oral at bedtime PRN Constipation        Vital Signs Last 24 Hrs  T(C): 37   T(F): 98.6   HR: 68  BP: 143/81\   BP(mean): --  RR: 18   SpO2: 97%           PHYSICAL EXAM:  GENERAL: NAD, well nourished and conversant  HEAD:  Atraumatic  EYES: EOM, PERRLA, conjunctiva pink and sclera white  ENT: No tonsillar erythema, exudates, or enlargement, moist mucous membranes, good dentition, no lesions  NECK: Supple, No JVD, normal thyroid, carotids with normal upstrokes and no bruits  CHEST/LUNG: Clear to auscultation bilaterally, No rales, rhonchi, wheezing, or rubs  HEART: Regular rate and rhythm, No murmurs, rubs, or gallops  ABDOMEN: Soft, nondistended, no masses, guarding, tenderness or rebound, bowel sounds present  EXTREMITIES:  2+ Peripheral Pulses, No clubbing, cyanosis, or edema.   LYMPH: No lymphadenopathy noted  SKIN: No rashes or lesions  NERVOUS SYSTEM:  Alert & Oriented . Motor Strength 5/5 right upper and right lower.  5/5 left upper and left lower extremities, DTRs 2+ intact and symmetric    LABS:      CBC Full  -  ( 22 Sep 2019 09:36 )  WBC Count : 6.10 K/uL  RBC Count : 2.59 M/uL  Hemoglobin : 8.2 g/dL  Hematocrit : 25.2 %  Platelet Count - Automated : 225 K/uL  Mean Cell Volume : 97.3 fl  Mean Cell Hemoglobin : 31.7 pg  Mean Cell Hemoglobin Concentration : 32.5 gm/dL  Auto Neutrophil # : x  Auto Lymphocyte # : x  Auto Monocyte # : x  Auto Eosinophil # : x  Auto Basophil # : x  Auto Neutrophil % : x  Auto Lymphocyte % : x  Auto Monocyte % : x  Auto Eosinophil % : x  Auto Basophil % : x    09-22    138  |  105  |  18  ----------------------------<  101<H>  3.9   |  23  |  1.10    Ca    8.6      22 Sep 2019 07:19    CBC Full  -  ( 20 Sep 2019 06:49 )  WBC Count : 8.2 K/uL  RBC Count : 2.90 M/uL  Hemoglobin : 9.9 g/dL  Hematocrit : 28.8 %  Platelet Count - Automated : 202 K/uL  Mean Cell Volume : 99.4 fl  Mean Cell Hemoglobin : 34.1 pg  Mean Cell Hemoglobin Concentration : 34.3 gm/dL  Auto Neutrophil # : x  Auto Lymphocyte # : x  Auto Monocyte # : x  Auto Eosinophil # : x  Auto Basophil # : x  Auto Neutrophil % : x  Auto Lymphocyte % : x  Auto Monocyte % : x  Auto Eosinophil % : x  Auto Basophil % : x      CBC Full  -  ( 19 Sep 2019 08:33 )  WBC Count : 9.22 K/uL  RBC Count : 2.63 M/uL  Hemoglobin : 8.4 g/dL  Hematocrit : 26.0 %  Platelet Count - Automated : 175 K/uL  Mean Cell Volume : 98.9 fl  Mean Cell Hemoglobin : 31.9 pg  Mean Cell Hemoglobin Concentration : 32.3 gm/dL  Auto Neutrophil # : x  Auto Lymphocyte # : x  Auto Monocyte # : x  Auto Eosinophil # : x  Auto Basophil # : x  Auto Neutrophil % : x  Auto Lymphocyte % : x  Auto Monocyte % : x  Auto Eosinophil % : x  Auto Basophil % : x                CAPILLARY BLOOD GLUCOSE          RADIOLOGY & ADDITIONAL TESTS:

## 2019-09-23 LAB
ALBUMIN SERPL ELPH-MCNC: 3.1 G/DL — LOW (ref 3.3–5)
ALP SERPL-CCNC: 65 U/L — SIGNIFICANT CHANGE UP (ref 40–120)
ALT FLD-CCNC: 12 U/L — SIGNIFICANT CHANGE UP (ref 10–45)
ANION GAP SERPL CALC-SCNC: 7 MMOL/L — SIGNIFICANT CHANGE UP (ref 5–17)
AST SERPL-CCNC: 11 U/L — SIGNIFICANT CHANGE UP (ref 10–40)
BILIRUB SERPL-MCNC: 0.3 MG/DL — SIGNIFICANT CHANGE UP (ref 0.2–1.2)
BUN SERPL-MCNC: 15 MG/DL — SIGNIFICANT CHANGE UP (ref 7–23)
CALCIUM SERPL-MCNC: 8.9 MG/DL — SIGNIFICANT CHANGE UP (ref 8.4–10.5)
CHLORIDE SERPL-SCNC: 108 MMOL/L — SIGNIFICANT CHANGE UP (ref 96–108)
CO2 SERPL-SCNC: 25 MMOL/L — SIGNIFICANT CHANGE UP (ref 22–31)
CREAT SERPL-MCNC: 1.13 MG/DL — SIGNIFICANT CHANGE UP (ref 0.5–1.3)
GLUCOSE SERPL-MCNC: 98 MG/DL — SIGNIFICANT CHANGE UP (ref 70–99)
HCT VFR BLD CALC: 26.7 % — LOW (ref 39–50)
HGB BLD-MCNC: 8.7 G/DL — LOW (ref 13–17)
MCHC RBC-ENTMCNC: 32 PG — SIGNIFICANT CHANGE UP (ref 27–34)
MCHC RBC-ENTMCNC: 32.6 GM/DL — SIGNIFICANT CHANGE UP (ref 32–36)
MCV RBC AUTO: 98.2 FL — SIGNIFICANT CHANGE UP (ref 80–100)
PLATELET # BLD AUTO: 224 K/UL — SIGNIFICANT CHANGE UP (ref 150–400)
POTASSIUM SERPL-MCNC: 3.9 MMOL/L — SIGNIFICANT CHANGE UP (ref 3.5–5.3)
POTASSIUM SERPL-SCNC: 3.9 MMOL/L — SIGNIFICANT CHANGE UP (ref 3.5–5.3)
PROT SERPL-MCNC: 5.8 G/DL — LOW (ref 6–8.3)
RBC # BLD: 2.72 M/UL — LOW (ref 4.2–5.8)
RBC # FLD: 15 % — HIGH (ref 10.3–14.5)
SODIUM SERPL-SCNC: 140 MMOL/L — SIGNIFICANT CHANGE UP (ref 135–145)
WBC # BLD: 5.93 K/UL — SIGNIFICANT CHANGE UP (ref 3.8–10.5)
WBC # FLD AUTO: 5.93 K/UL — SIGNIFICANT CHANGE UP (ref 3.8–10.5)

## 2019-09-23 RX ADMIN — SENNA PLUS 2 TABLET(S): 8.6 TABLET ORAL at 22:33

## 2019-09-23 RX ADMIN — RIVASTIGMINE 1 PATCH: 4.6 PATCH, EXTENDED RELEASE TRANSDERMAL at 07:00

## 2019-09-23 RX ADMIN — FINASTERIDE 5 MILLIGRAM(S): 5 TABLET, FILM COATED ORAL at 18:00

## 2019-09-23 RX ADMIN — ATORVASTATIN CALCIUM 10 MILLIGRAM(S): 80 TABLET, FILM COATED ORAL at 22:33

## 2019-09-23 RX ADMIN — Medication 100 MILLIGRAM(S): at 18:01

## 2019-09-23 RX ADMIN — Medication 100 MILLIGRAM(S): at 05:34

## 2019-09-23 RX ADMIN — TAMSULOSIN HYDROCHLORIDE 0.4 MILLIGRAM(S): 0.4 CAPSULE ORAL at 22:33

## 2019-09-23 RX ADMIN — Medication 1 TABLET(S): at 17:59

## 2019-09-23 RX ADMIN — SODIUM CHLORIDE 40 MILLILITER(S): 9 INJECTION INTRAMUSCULAR; INTRAVENOUS; SUBCUTANEOUS at 22:33

## 2019-09-23 RX ADMIN — RIVASTIGMINE 1 PATCH: 4.6 PATCH, EXTENDED RELEASE TRANSDERMAL at 22:33

## 2019-09-23 RX ADMIN — SODIUM CHLORIDE 40 MILLILITER(S): 9 INJECTION INTRAMUSCULAR; INTRAVENOUS; SUBCUTANEOUS at 05:33

## 2019-09-23 RX ADMIN — RIVASTIGMINE 1 PATCH: 4.6 PATCH, EXTENDED RELEASE TRANSDERMAL at 19:26

## 2019-09-23 RX ADMIN — Medication 100 MILLIGRAM(S): at 22:33

## 2019-09-23 RX ADMIN — RIVASTIGMINE 1 PATCH: 4.6 PATCH, EXTENDED RELEASE TRANSDERMAL at 22:19

## 2019-09-23 NOTE — PROGRESS NOTE ADULT - ASSESSMENT
89 yo male with Hx of CAD s/p PCI x2 on plavix, Atrial Fibrillation, HLD, HTN, Dementia , UTI was brought in the [facility] due to report of Stanislaw hematuria s/p CBI with noted Macrocytic Anemia with significant drop in Hemoglobin.  Patient with an episode of confusion which has improved after stopping cipro.  More alert now. Needs PT and eventually rehab.  g.

## 2019-09-23 NOTE — PROGRESS NOTE ADULT - SUBJECTIVE AND OBJECTIVE BOX
89 yo male with Hx of CAD s/p PCI x2 as reported, TIA on plavix, Atrial Fibrillation on no AC , HLD, HTN, Dementia , UTI was brought in the hospital from [facility] due to report of Ghada hematuria.  AS per daughter, pt has recurrennt UTI and was recently seen by Urologist who prescribed Monural for 3 doses , patient received 2 doses so far and started having the ghada hematuria.  Pt started having ghada hematuria started 2-3 days prior to admission  with no fever or reported abd pain.  As per daughter, patient was in hosp in March or April 2019 at another hospital for UTI and was dc to [facility] and subsequently dc to home.  As per PMD daughter reported abd distension.  A CBI was placed in the ED with reported of about one Liter of urine with Blood and clots. Bleeding has now stopped and the patient is to have a ct of abdomen and pelvis with contrast to better determine the etiology of his hematuria.  Patient states he  is comfortable. Patient less confused today. TOV in progress.      MEDICATIONS  (STANDING):  atorvastatin 10 milliGRAM(s) Oral at bedtime  calcium carbonate 1250 mG  + Vitamin D (OsCal 500 + D) 1 Tablet(s) Oral daily  docusate sodium 100 milliGRAM(s) Oral three times a day  finasteride 5 milliGRAM(s) Oral daily  multivitamin 1 Tablet(s) Oral daily  rivastigmine patch  9.5 mG/24 Hr(s) 1 Patch Transdermal every 24 hours  senna 2 Tablet(s) Oral at bedtime  sodium chloride 0.9%. 1000 milliLiter(s) (40 mL/Hr) IV Continuous <Continuous>  tamsulosin 0.4 milliGRAM(s) Oral at bedtime    MEDICATIONS  (PRN):  acetaminophen    Suspension .. 650 milliGRAM(s) Oral every 4 hours PRN Mild Pain (1 - 3)  ondansetron Injectable 4 milliGRAM(s) IV Push every 6 hours PRN Nausea  polyethylene glycol 3350 17 Gram(s) Oral at bedtime PRN Constipation          VITALS:   T(C): 36.3 (09-23-19 @ 16:25), Max: 37.1 (09-22-19 @ 20:02)  HR: 55 (09-23-19 @ 16:25) (55 - 102)  BP: 128/66 (09-23-19 @ 16:25) (114/65 - 173/76)  RR: 18 (09-23-19 @ 16:25) (18 - 18)  SpO2: 100% (09-23-19 @ 16:25) (93% - 100%)  Wt(kg): --    PHYSICAL EXAM:  GENERAL: NAD, well nourished and conversant  HEAD:  Atraumatic  EYES: EOM, PERRLA, conjunctiva pink and sclera white  ENT: No tonsillar erythema, exudates, or enlargement, moist mucous membranes, good dentition, no lesions  NECK: Supple, No JVD, normal thyroid, carotids with normal upstrokes and no bruits  CHEST/LUNG: Clear to auscultation bilaterally, No rales, rhonchi, wheezing, or rubs  HEART: Regular rate and rhythm, No murmurs, rubs, or gallops  ABDOMEN: Soft, nondistended, no masses, guarding, tenderness or rebound, bowel sounds present  EXTREMITIES:  2+ Peripheral Pulses, No clubbing, cyanosis, or edema.   LYMPH: No lymphadenopathy noted  SKIN: No rashes or lesions  NERVOUS SYSTEM:  Alert & Oriented . Motor Strength 5/5 right upper and right lower.  5/5 left upper and left lower extremities, DTRs 2+ intact and symmetric    LABS:        CBC Full  -  ( 23 Sep 2019 09:44 )  WBC Count : 5.93 K/uL  RBC Count : 2.72 M/uL  Hemoglobin : 8.7 g/dL  Hematocrit : 26.7 %  Platelet Count - Automated : 224 K/uL  Mean Cell Volume : 98.2 fl  Mean Cell Hemoglobin : 32.0 pg  Mean Cell Hemoglobin Concentration : 32.6 gm/dL  Auto Neutrophil # : x  Auto Lymphocyte # : x  Auto Monocyte # : x  Auto Eosinophil # : x  Auto Basophil # : x  Auto Neutrophil % : x  Auto Lymphocyte % : x  Auto Monocyte % : x  Auto Eosinophil % : x  Auto Basophil % : x    09-23    140  |  108  |  15  ----------------------------<  98  3.9   |  25  |  1.13    Ca    8.9      23 Sep 2019 08:33    TPro  5.8<L>  /  Alb  3.1<L>  /  TBili  0.3  /  DBili  x   /  AST  11  /  ALT  12  /  AlkPhos  65  09-23    LIVER FUNCTIONS - ( 23 Sep 2019 08:33 )  Alb: 3.1 g/dL / Pro: 5.8 g/dL / ALK PHOS: 65 U/L / ALT: 12 U/L / AST: 11 U/L / GGT: x               CAPILLARY BLOOD GLUCOSE          RADIOLOGY & ADDITIONAL TESTS:

## 2019-09-24 VITALS
DIASTOLIC BLOOD PRESSURE: 74 MMHG | SYSTOLIC BLOOD PRESSURE: 137 MMHG | TEMPERATURE: 97 F | HEART RATE: 65 BPM | RESPIRATION RATE: 18 BRPM | OXYGEN SATURATION: 97 %

## 2019-09-24 DIAGNOSIS — R31.9 HEMATURIA, UNSPECIFIED: ICD-10-CM

## 2019-09-24 DIAGNOSIS — N40.1 BENIGN PROSTATIC HYPERPLASIA WITH LOWER URINARY TRACT SYMPTOMS: ICD-10-CM

## 2019-09-24 RX ORDER — INFLUENZA VIRUS VACCINE 15; 15; 15; 15 UG/.5ML; UG/.5ML; UG/.5ML; UG/.5ML
0.5 SUSPENSION INTRAMUSCULAR ONCE
Refills: 0 | Status: COMPLETED | OUTPATIENT
Start: 2019-09-24 | End: 2019-09-24

## 2019-09-24 RX ORDER — ACETAMINOPHEN 500 MG
20.31 TABLET ORAL
Qty: 0 | Refills: 0 | DISCHARGE
Start: 2019-09-24

## 2019-09-24 RX ORDER — SENNA PLUS 8.6 MG/1
2 TABLET ORAL
Qty: 0 | Refills: 0 | DISCHARGE
Start: 2019-09-24

## 2019-09-24 RX ORDER — DOCUSATE SODIUM 100 MG
1 CAPSULE ORAL
Qty: 0 | Refills: 0 | DISCHARGE
Start: 2019-09-24

## 2019-09-24 RX ORDER — POLYETHYLENE GLYCOL 3350 17 G/17G
17 POWDER, FOR SOLUTION ORAL
Qty: 0 | Refills: 0 | DISCHARGE
Start: 2019-09-24

## 2019-09-24 RX ORDER — CHOLECALCIFEROL (VITAMIN D3) 125 MCG
1 CAPSULE ORAL
Qty: 0 | Refills: 0 | DISCHARGE

## 2019-09-24 RX ORDER — CLOPIDOGREL BISULFATE 75 MG/1
1 TABLET, FILM COATED ORAL
Qty: 0 | Refills: 0 | DISCHARGE

## 2019-09-24 RX ADMIN — FINASTERIDE 5 MILLIGRAM(S): 5 TABLET, FILM COATED ORAL at 11:08

## 2019-09-24 RX ADMIN — SODIUM CHLORIDE 40 MILLILITER(S): 9 INJECTION INTRAMUSCULAR; INTRAVENOUS; SUBCUTANEOUS at 05:24

## 2019-09-24 RX ADMIN — Medication 1 TABLET(S): at 11:08

## 2019-09-24 RX ADMIN — Medication 100 MILLIGRAM(S): at 05:24

## 2019-09-24 RX ADMIN — INFLUENZA VIRUS VACCINE 0.5 MILLILITER(S): 15; 15; 15; 15 SUSPENSION INTRAMUSCULAR at 11:07

## 2019-09-24 NOTE — PROGRESS NOTE ADULT - PROBLEM SELECTOR PLAN 7
HOLD Plavix  will eventually restart when stable
HOLD Plavix
HOLD Plavix  will eventually restart when stable

## 2019-09-24 NOTE — PROGRESS NOTE ADULT - SUBJECTIVE AND OBJECTIVE BOX
89 yo male with Hx of CAD s/p PCI x2 as reported, TIA on plavix, Atrial Fibrillation on no AC , HLD, HTN, Dementia , UTI was [facility] due to report of Ghada hematuria.  AS per daughter, pt has recurrennt UTI and was recently seen by Urologist who prescribed Monural for 3 doses , patient received 2 doses so far and started having the ghada hematuria.  Pt started having ghada hematuria started 2-3 days prior to admission  with no fever or reported abd pain.  As per daughter, patient was in hosp in March or April 2019 at another hospital for UTI and was dc to [facility] ([facility] and subsequently dc to home.  As per PMD daughter reported abd distension.  A CBI was placed in the ED with reported of about one Liter of urine with Blood and clots. Bleeding has now stopped and the patient is to have a ct of abdomen and pelvis with contrast to better determine the etiology of his hematuria.  Patient states he  is comfortable. Patient less confused today. TOV in progress.      MEDICATIONS  (STANDING):  atorvastatin 10 milliGRAM(s) Oral at bedtime  calcium carbonate 1250 mG  + Vitamin D (OsCal 500 + D) 1 Tablet(s) Oral daily  docusate sodium 100 milliGRAM(s) Oral three times a day  finasteride 5 milliGRAM(s) Oral daily  multivitamin 1 Tablet(s) Oral daily  rivastigmine patch  9.5 mG/24 Hr(s) 1 Patch Transdermal every 24 hours  senna 2 Tablet(s) Oral at bedtime  tamsulosin 0.4 milliGRAM(s) Oral at bedtime    MEDICATIONS  (PRN):  acetaminophen    Suspension .. 650 milliGRAM(s) Oral every 4 hours PRN Mild Pain (1 - 3)  ondansetron Injectable 4 milliGRAM(s) IV Push every 6 hours PRN Nausea  polyethylene glycol 3350 17 Gram(s) Oral at bedtime PRN Constipation          Vital Signs Last 24 Hrs  T(C): 36.3 (24 Sep 2019 12:04), Max: 36.4 (23 Sep 2019 20:03)  T(F): 97.4 (24 Sep 2019 12:04), Max: 97.6 (23 Sep 2019 20:03)  HR: 65 (24 Sep 2019 12:04) (63 - 65)  BP: 137/74 (24 Sep 2019 12:04) (137/74 - 154/75)  BP(mean): --  RR: 18 (24 Sep 2019 12:04) (18 - 18)  SpO2: 97% (24 Sep 2019 12:04) (97% - 97%)    PHYSICAL EXAM:  GENERAL: NAD, well nourished and conversant  HEAD:  Atraumatic  EYES: EOM, PERRLA, conjunctiva pink and sclera white  ENT: No tonsillar erythema, exudates, or enlargement, moist mucous membranes, good dentition, no lesions  NECK: Supple, No JVD, normal thyroid, carotids with normal upstrokes and no bruits  CHEST/LUNG: Clear to auscultation bilaterally, No rales, rhonchi, wheezing, or rubs  HEART: Regular rate and rhythm, No murmurs, rubs, or gallops  ABDOMEN: Soft, nondistended, no masses, guarding, tenderness or rebound, bowel sounds present  EXTREMITIES:  2+ Peripheral Pulses, No clubbing, cyanosis, or edema.   LYMPH: No lymphadenopathy noted  SKIN: No rashes or lesions  NERVOUS SYSTEM:  Alert & Oriented . Motor Strength 5/5 right upper and right lower.  5/5 left upper and left lower extremities, DTRs 2+ intact and symmetric    LABS: No  labs obtained today          CAPILLARY BLOOD GLUCOSE          RADIOLOGY & ADDITIONAL TESTS: 89 yo male with Hx of CAD s/p PCI x2 as reported, TIA on plavix, Atrial Fibrillation on no AC , HLD, HTN, Dementia , UTI was brought in the hospital [facility] due to report of Ghada hematuria.  AS per daughter, pt has recurrennt UTI and was recently seen by Urologist who prescribed Monural for 3 doses , patient received 2 doses so far and started having the ghada hematuria.  Pt started having ghada hematuria started 2-3 days prior to admission  with no fever or reported abd pain.  As per daughter, patient was in hosp in March or April 2019 at another hospital for UTI and was dc to [facility] and subsequently dc to home.  As per PMD daughter reported abd distension.  A CBI was placed in the ED with reported of about one Liter of urine with Blood and clots. Bleeding has now stopped and the patient had a ct of abdomen and pelvis with contrast  to better determine the etiology of his hematuria on 09/17/19. No renal source identified and + cystitis noted. Followed by urology and no cystoscopy performed to date. Patient states he  is comfortable and is  less confused today. Sonography post void with 350 cc residual and Mai reintroduced, with relief of discomfort..      MEDICATIONS  (STANDING):  atorvastatin 10 milliGRAM(s) Oral at bedtime  calcium carbonate 1250 mG  + Vitamin D (OsCal 500 + D) 1 Tablet(s) Oral daily  docusate sodium 100 milliGRAM(s) Oral three times a day  finasteride 5 milliGRAM(s) Oral daily  multivitamin 1 Tablet(s) Oral daily  rivastigmine patch  9.5 mG/24 Hr(s) 1 Patch Transdermal every 24 hours  senna 2 Tablet(s) Oral at bedtime  tamsulosin 0.4 milliGRAM(s) Oral at bedtime    MEDICATIONS  (PRN):  acetaminophen    Suspension .. 650 milliGRAM(s) Oral every 4 hours PRN Mild Pain (1 - 3)  ondansetron Injectable 4 milliGRAM(s) IV Push every 6 hours PRN Nausea  polyethylene glycol 3350 17 Gram(s) Oral at bedtime PRN Constipation          Vital Signs Last 24 Hrs  T(C): 36.3 (24 Sep 2019 12:04), Max: 36.4 (23 Sep 2019 20:03)  T(F): 97.4 (24 Sep 2019 12:04), Max: 97.6 (23 Sep 2019 20:03)  HR: 65 (24 Sep 2019 12:04) (63 - 65)  BP: 137/74 (24 Sep 2019 12:04) (137/74 - 154/75)  BP(mean): --  RR: 18 (24 Sep 2019 12:04) (18 - 18)  SpO2: 97% (24 Sep 2019 12:04) (97% - 97%)    PHYSICAL EXAM:  GENERAL: NAD, well nourished and conversant  HEAD:  Atraumatic  EYES: EOM, PERRLA, conjunctiva pink and sclera white  ENT: No tonsillar erythema, exudates, or enlargement, moist mucous membranes, good dentition, no lesions  NECK: Supple, No JVD, normal thyroid, carotids with normal upstrokes and no bruits  CHEST/LUNG: Clear to auscultation bilaterally, No rales, rhonchi, wheezing, or rubs  HEART: Regular rate and rhythm, No murmurs, rubs, or gallops  ABDOMEN: Soft, nondistended, no masses, guarding, tenderness or rebound, bowel sounds present  EXTREMITIES:  2+ Peripheral Pulses, No clubbing, cyanosis, or edema.   LYMPH: No lymphadenopathy noted  SKIN: No rashes or lesions  NERVOUS SYSTEM:  Alert & Oriented . Motor Strength 5/5 right upper and right lower.  5/5 left upper and left lower extremities, DTRs 2+ intact and symmetric    LABS: No  labs obtained today          CAPILLARY BLOOD GLUCOSE          RADIOLOGY & ADDITIONAL TESTS:

## 2019-09-24 NOTE — PROGRESS NOTE ADULT - PROBLEM SELECTOR PLAN 5
continue atorvastatin
COnt atorvastatin
continue atorvastatin

## 2019-09-24 NOTE — PROGRESS NOTE ADULT - PROBLEM SELECTOR PLAN 6
HOLD  Antihypertensive now  Please restart if BP remains stable
HOLD  Antihypertensive now  restart if BP remains stable

## 2019-09-24 NOTE — PROGRESS NOTE ADULT - PROBLEM SELECTOR PROBLEM 5
Hypercholesteremia

## 2019-09-24 NOTE — PROGRESS NOTE ADULT - ASSESSMENT
89 yo male with Hx of CAD s/p PCI x2 on plavix, Atrial Fibrillation, HLD, HTN, Dementia , UTI was brought in the hospital [facility] due to report of Stanislaw hematuria s/p CBI with noted Macrocytic Anemia with significant drop in Hemoglobin.  Patient with an episode of confusion which has improved after stopping cipro.  More alert now. Needs PT and eventually rehab.  g. 89 yo male with Hx of CAD s/p PCI x2 on plavix, Atrial Fibrillation, HLD, HTN, Dementia , UTI was brought in the hospital from [facility] due to report of Stanislaw hematuria s/p CBI with noted Macrocytic Anemia with significant drop in Hemoglobin.  Patient with an episode of confusion which has improved after stopping cipro.  More alert now. Needs PT and eventually rehab.

## 2019-09-24 NOTE — PROGRESS NOTE ADULT - NSHPATTENDINGPLANDISCUSS_GEN_ALL_CORE
patient and staff
patient
patient and staff

## 2019-09-24 NOTE — PROGRESS NOTE ADULT - PROBLEM SELECTOR PROBLEM 4
Essential hypertension
Dementia without behavioral disturbance, unspecified dementia type
Essential hypertension
Dementia without behavioral disturbance, unspecified dementia type
Essential hypertension

## 2019-09-24 NOTE — PROGRESS NOTE ADULT - PROBLEM SELECTOR PROBLEM 7
Coronary artery disease involving native coronary artery of native heart, angina presence unspecified

## 2019-09-24 NOTE — PROGRESS NOTE ADULT - REASON FOR ADMISSION
Hematuria

## 2019-09-24 NOTE — PROGRESS NOTE ADULT - PROBLEM SELECTOR PROBLEM 2
Anemia due to blood loss
BPH with obstruction/lower urinary tract symptoms
Anemia due to blood loss

## 2019-09-24 NOTE — PROGRESS NOTE ADULT - PROBLEM SELECTOR PROBLEM 1
Hematuria
Gross hematuria

## 2019-09-24 NOTE — PROGRESS NOTE ADULT - ATTENDING COMMENTS
DC planning to rehab after TOV Bleeding has now stopped and the patient had a ct of abdomen and pelvis with contrast  to better determine the etiology of his hematuria on 09/17/19. No renal source identified and + cystitis noted. Followed by urology and no cystoscopy performed to date. Patient states he  is comfortable and is  less confused today. Sonography post void with 350 cc residual and Mai reintroduced, with relief of discomfort. Cleared by urology for discharge to rehabilitation.  Full instructions provided regarding diagnosis, treatment, discharge medications, diet and follow up care on transfer sheet. Medically stable and for discharge to rehabilitation today as planned.

## 2019-09-24 NOTE — CHART NOTE - NSCHARTNOTEFT_GEN_A_CORE
Request from Dr. Marquez to facilitate patient discharge.  Mai replaced secondary to urinary retention.  Medication reconciliation reviewed, revised, and resolved with Dr. Marquez, who has medically cleared patient for discharge with follow up as advised.  Please refer to discharge note for detailed hospital course.

## 2019-09-24 NOTE — PROGRESS NOTE ADULT - PROBLEM SELECTOR PLAN 1
CBI DCed  will need TOV with osorio in place  No further hematuria
CBI placed in the ED   Urologist ( Dr Goldberg has seen pt  F/UP Urine CX and UA   (+) hemorrhagic cystitis Started on antibiotics pending culture results    pt received Ceftriaxone in the ED  ID consult is called ( LATOYA Oneal)
CBI DCed  will need TOV with osorio in place  No further hematuria
CBI DCed  will need TOV with osorio in place  No further hematuria
CBI placed in the ED   urine has cleared  Urology following  DC CBI  will discuss the need for cystoscopy with urology  F/UP Urine CX and UA   with increased confusion will dc cipro and follow off abx
CBI placed in the ED   urine has cleared  Urology following  F/UP Urine CX and UA   with increased confusion will dc cipro and follow off abx    pt received Ceftriaxone in the ED  ID consult is called ( LATOYA Oneal)
CBI placed in the ED   urine has cleared  Urology following  will need to DC CBI and do a TOV  F/UP Urine CX and UA   with increased confusion will dc cipro and follow off abx
TOV in progress  replace osorio if needed
TOV in progress  replace osorio if needed
CBI placed in the ED   urine has cleared  Urology following  will need to DC CBI and do a TOV  F/UP Urine CX and UA   with increased confusion will dc cipro and follow off abx
CBI DCed  will need TOV with osorio in place  No further hematuria

## 2019-09-24 NOTE — PROGRESS NOTE ADULT - PROBLEM SELECTOR PLAN 3
Mental status returning to baseline but still slow thought process
Rate control   not on AC
Mental status returning to baseline but still slow thought process
Rate control   not on AC
Mental status returning to baseline but still slow thought process

## 2019-09-24 NOTE — PROGRESS NOTE ADULT - PROBLEM SELECTOR PLAN 2
follow H&H and transfuse as needed
will transfuse 1 unit PRBC  continue hematuria   will transfuse as needed
follow H&H and transfuse as needed
will transfuse 1 unit PRBC  continue hematuria   will transfuse as needed
follow H&H and transfuse as needed
follow H&H and transfuse as needed

## 2019-09-24 NOTE — PROGRESS NOTE ADULT - PROBLEM SELECTOR PROBLEM 3
Dementia without behavioral disturbance, unspecified dementia type
Atrial fibrillation, unspecified type
Dementia without behavioral disturbance, unspecified dementia type
Atrial fibrillation, unspecified type
Dementia without behavioral disturbance, unspecified dementia type

## 2019-10-13 PROCEDURE — 94640 AIRWAY INHALATION TREATMENT: CPT

## 2019-10-13 PROCEDURE — 74018 RADEX ABDOMEN 1 VIEW: CPT

## 2019-10-13 PROCEDURE — 86901 BLOOD TYPING SEROLOGIC RH(D): CPT

## 2019-10-13 PROCEDURE — 92610 EVALUATE SWALLOWING FUNCTION: CPT

## 2019-10-13 PROCEDURE — 76770 US EXAM ABDO BACK WALL COMP: CPT

## 2019-10-13 PROCEDURE — 73110 X-RAY EXAM OF WRIST: CPT

## 2019-10-13 PROCEDURE — 96374 THER/PROPH/DIAG INJ IV PUSH: CPT | Mod: XU

## 2019-10-13 PROCEDURE — 36430 TRANSFUSION BLD/BLD COMPNT: CPT

## 2019-10-13 PROCEDURE — 84132 ASSAY OF SERUM POTASSIUM: CPT

## 2019-10-13 PROCEDURE — 97162 PT EVAL MOD COMPLEX 30 MIN: CPT

## 2019-10-13 PROCEDURE — 86850 RBC ANTIBODY SCREEN: CPT

## 2019-10-13 PROCEDURE — 82728 ASSAY OF FERRITIN: CPT

## 2019-10-13 PROCEDURE — 83540 ASSAY OF IRON: CPT

## 2019-10-13 PROCEDURE — 81001 URINALYSIS AUTO W/SCOPE: CPT

## 2019-10-13 PROCEDURE — 97110 THERAPEUTIC EXERCISES: CPT

## 2019-10-13 PROCEDURE — 82962 GLUCOSE BLOOD TEST: CPT

## 2019-10-13 PROCEDURE — 82607 VITAMIN B-12: CPT

## 2019-10-13 PROCEDURE — 85014 HEMATOCRIT: CPT

## 2019-10-13 PROCEDURE — 90662 IIV NO PRSV INCREASED AG IM: CPT

## 2019-10-13 PROCEDURE — 83010 ASSAY OF HAPTOGLOBIN QUANT: CPT

## 2019-10-13 PROCEDURE — 82435 ASSAY OF BLOOD CHLORIDE: CPT

## 2019-10-13 PROCEDURE — 86923 COMPATIBILITY TEST ELECTRIC: CPT

## 2019-10-13 PROCEDURE — 83615 LACTATE (LD) (LDH) ENZYME: CPT

## 2019-10-13 PROCEDURE — 74230 X-RAY XM SWLNG FUNCJ C+: CPT

## 2019-10-13 PROCEDURE — 82947 ASSAY GLUCOSE BLOOD QUANT: CPT

## 2019-10-13 PROCEDURE — 80048 BASIC METABOLIC PNL TOTAL CA: CPT

## 2019-10-13 PROCEDURE — 82803 BLOOD GASES ANY COMBINATION: CPT

## 2019-10-13 PROCEDURE — 83605 ASSAY OF LACTIC ACID: CPT

## 2019-10-13 PROCEDURE — 92611 MOTION FLUOROSCOPY/SWALLOW: CPT

## 2019-10-13 PROCEDURE — 86900 BLOOD TYPING SEROLOGIC ABO: CPT

## 2019-10-13 PROCEDURE — 99285 EMERGENCY DEPT VISIT HI MDM: CPT | Mod: 25

## 2019-10-13 PROCEDURE — 97530 THERAPEUTIC ACTIVITIES: CPT

## 2019-10-13 PROCEDURE — 82330 ASSAY OF CALCIUM: CPT

## 2019-10-13 PROCEDURE — 82746 ASSAY OF FOLIC ACID SERUM: CPT

## 2019-10-13 PROCEDURE — 80053 COMPREHEN METABOLIC PANEL: CPT

## 2019-10-13 PROCEDURE — 83550 IRON BINDING TEST: CPT

## 2019-10-13 PROCEDURE — 87040 BLOOD CULTURE FOR BACTERIA: CPT

## 2019-10-13 PROCEDURE — 85027 COMPLETE CBC AUTOMATED: CPT

## 2019-10-13 PROCEDURE — 87086 URINE CULTURE/COLONY COUNT: CPT

## 2019-10-13 PROCEDURE — P9016: CPT

## 2019-10-13 PROCEDURE — 71045 X-RAY EXAM CHEST 1 VIEW: CPT

## 2019-10-13 PROCEDURE — 51700 IRRIGATION OF BLADDER: CPT

## 2019-10-13 PROCEDURE — 85610 PROTHROMBIN TIME: CPT

## 2019-10-13 PROCEDURE — 74177 CT ABD & PELVIS W/CONTRAST: CPT

## 2019-10-13 PROCEDURE — 85730 THROMBOPLASTIN TIME PARTIAL: CPT

## 2019-10-13 PROCEDURE — 84295 ASSAY OF SERUM SODIUM: CPT

## 2019-11-18 ENCOUNTER — INPATIENT (INPATIENT)
Facility: HOSPITAL | Age: 84
LOS: 15 days | Discharge: NURSING, REHAB OR LONG-TERM CARE | DRG: 689 | End: 2019-12-04
Attending: INTERNAL MEDICINE | Admitting: INTERNAL MEDICINE
Payer: MEDICARE

## 2019-11-18 VITALS
TEMPERATURE: 98 F | DIASTOLIC BLOOD PRESSURE: 80 MMHG | HEIGHT: 67 IN | RESPIRATION RATE: 19 BRPM | OXYGEN SATURATION: 99 % | HEART RATE: 74 BPM | WEIGHT: 190.04 LBS | SYSTOLIC BLOOD PRESSURE: 144 MMHG

## 2019-11-18 DIAGNOSIS — I10 ESSENTIAL (PRIMARY) HYPERTENSION: ICD-10-CM

## 2019-11-18 DIAGNOSIS — F03.90 UNSPECIFIED DEMENTIA WITHOUT BEHAVIORAL DISTURBANCE: ICD-10-CM

## 2019-11-18 DIAGNOSIS — N39.0 URINARY TRACT INFECTION, SITE NOT SPECIFIED: ICD-10-CM

## 2019-11-18 DIAGNOSIS — R41.82 ALTERED MENTAL STATUS, UNSPECIFIED: ICD-10-CM

## 2019-11-18 LAB
ALBUMIN SERPL ELPH-MCNC: 3.6 G/DL — SIGNIFICANT CHANGE UP (ref 3.3–5)
ALP SERPL-CCNC: 75 U/L — SIGNIFICANT CHANGE UP (ref 40–120)
ALT FLD-CCNC: 35 U/L — SIGNIFICANT CHANGE UP (ref 10–45)
ANION GAP SERPL CALC-SCNC: 10 MMOL/L — SIGNIFICANT CHANGE UP (ref 5–17)
APPEARANCE UR: ABNORMAL
AST SERPL-CCNC: 37 U/L — SIGNIFICANT CHANGE UP (ref 10–40)
BACTERIA # UR AUTO: ABNORMAL
BASOPHILS # BLD AUTO: 0.02 K/UL — SIGNIFICANT CHANGE UP (ref 0–0.2)
BASOPHILS NFR BLD AUTO: 0.2 % — SIGNIFICANT CHANGE UP (ref 0–2)
BILIRUB SERPL-MCNC: 0.2 MG/DL — SIGNIFICANT CHANGE UP (ref 0.2–1.2)
BILIRUB UR-MCNC: NEGATIVE — SIGNIFICANT CHANGE UP
BUN SERPL-MCNC: 30 MG/DL — HIGH (ref 7–23)
CALCIUM SERPL-MCNC: 9.2 MG/DL — SIGNIFICANT CHANGE UP (ref 8.4–10.5)
CHLORIDE SERPL-SCNC: 105 MMOL/L — SIGNIFICANT CHANGE UP (ref 96–108)
CO2 SERPL-SCNC: 22 MMOL/L — SIGNIFICANT CHANGE UP (ref 22–31)
COLOR SPEC: ABNORMAL
CREAT SERPL-MCNC: 1.18 MG/DL — SIGNIFICANT CHANGE UP (ref 0.5–1.3)
DIFF PNL FLD: ABNORMAL
EOSINOPHIL # BLD AUTO: 0.04 K/UL — SIGNIFICANT CHANGE UP (ref 0–0.5)
EOSINOPHIL NFR BLD AUTO: 0.4 % — SIGNIFICANT CHANGE UP (ref 0–6)
EPI CELLS # UR: 1 — SIGNIFICANT CHANGE UP
GLUCOSE SERPL-MCNC: 94 MG/DL — SIGNIFICANT CHANGE UP (ref 70–99)
GLUCOSE UR QL: NEGATIVE — SIGNIFICANT CHANGE UP
HCT VFR BLD CALC: 31.1 % — LOW (ref 39–50)
HGB BLD-MCNC: 10.3 G/DL — LOW (ref 13–17)
HYALINE CASTS # UR AUTO: 1 /LPF — SIGNIFICANT CHANGE UP (ref 0–7)
IMM GRANULOCYTES NFR BLD AUTO: 0.4 % — SIGNIFICANT CHANGE UP (ref 0–1.5)
KETONES UR-MCNC: NEGATIVE — SIGNIFICANT CHANGE UP
LEUKOCYTE ESTERASE UR-ACNC: ABNORMAL
LYMPHOCYTES # BLD AUTO: 1.11 K/UL — SIGNIFICANT CHANGE UP (ref 1–3.3)
LYMPHOCYTES # BLD AUTO: 10 % — LOW (ref 13–44)
MCHC RBC-ENTMCNC: 30 PG — SIGNIFICANT CHANGE UP (ref 27–34)
MCHC RBC-ENTMCNC: 33.1 GM/DL — SIGNIFICANT CHANGE UP (ref 32–36)
MCV RBC AUTO: 90.7 FL — SIGNIFICANT CHANGE UP (ref 80–100)
MONOCYTES # BLD AUTO: 1.08 K/UL — HIGH (ref 0–0.9)
MONOCYTES NFR BLD AUTO: 9.8 % — SIGNIFICANT CHANGE UP (ref 2–14)
NEUTROPHILS # BLD AUTO: 8.78 K/UL — HIGH (ref 1.8–7.4)
NEUTROPHILS NFR BLD AUTO: 79.2 % — HIGH (ref 43–77)
NITRITE UR-MCNC: POSITIVE
NRBC # BLD: 0 /100 WBCS — SIGNIFICANT CHANGE UP (ref 0–0)
PH UR: 7 — SIGNIFICANT CHANGE UP (ref 5–8)
PLATELET # BLD AUTO: 207 K/UL — SIGNIFICANT CHANGE UP (ref 150–400)
POTASSIUM SERPL-MCNC: 4.9 MMOL/L — SIGNIFICANT CHANGE UP (ref 3.5–5.3)
POTASSIUM SERPL-SCNC: 4.9 MMOL/L — SIGNIFICANT CHANGE UP (ref 3.5–5.3)
PROT SERPL-MCNC: 7.2 G/DL — SIGNIFICANT CHANGE UP (ref 6–8.3)
PROT UR-MCNC: 100 — SIGNIFICANT CHANGE UP
RBC # BLD: 3.43 M/UL — LOW (ref 4.2–5.8)
RBC # FLD: 14.7 % — HIGH (ref 10.3–14.5)
RBC CASTS # UR COMP ASSIST: >50 /HPF — HIGH (ref 0–4)
SODIUM SERPL-SCNC: 137 MMOL/L — SIGNIFICANT CHANGE UP (ref 135–145)
SP GR SPEC: 1.02 — SIGNIFICANT CHANGE UP (ref 1.01–1.02)
UROBILINOGEN FLD QL: ABNORMAL
WBC # BLD: 11.07 K/UL — HIGH (ref 3.8–10.5)
WBC # FLD AUTO: 11.07 K/UL — HIGH (ref 3.8–10.5)
WBC UR QL: >50 /HPF — HIGH (ref 0–5)

## 2019-11-18 PROCEDURE — 71045 X-RAY EXAM CHEST 1 VIEW: CPT | Mod: 26

## 2019-11-18 PROCEDURE — 99285 EMERGENCY DEPT VISIT HI MDM: CPT

## 2019-11-18 PROCEDURE — 70450 CT HEAD/BRAIN W/O DYE: CPT | Mod: 26

## 2019-11-18 PROCEDURE — 93010 ELECTROCARDIOGRAM REPORT: CPT

## 2019-11-18 RX ORDER — PIPERACILLIN AND TAZOBACTAM 4; .5 G/20ML; G/20ML
3.38 INJECTION, POWDER, LYOPHILIZED, FOR SOLUTION INTRAVENOUS ONCE
Refills: 0 | Status: COMPLETED | OUTPATIENT
Start: 2019-11-18 | End: 2019-11-18

## 2019-11-18 RX ORDER — ACETAMINOPHEN 500 MG
650 TABLET ORAL EVERY 4 HOURS
Refills: 0 | Status: DISCONTINUED | OUTPATIENT
Start: 2019-11-18 | End: 2019-12-04

## 2019-11-18 RX ORDER — RIVASTIGMINE 4.6 MG/24H
1 PATCH, EXTENDED RELEASE TRANSDERMAL EVERY 24 HOURS
Refills: 0 | Status: DISCONTINUED | OUTPATIENT
Start: 2019-11-18 | End: 2019-11-24

## 2019-11-18 RX ORDER — FINASTERIDE 5 MG/1
5 TABLET, FILM COATED ORAL DAILY
Refills: 0 | Status: DISCONTINUED | OUTPATIENT
Start: 2019-11-18 | End: 2019-12-04

## 2019-11-18 RX ORDER — ERTAPENEM SODIUM 1 G/1
1000 INJECTION, POWDER, LYOPHILIZED, FOR SOLUTION INTRAMUSCULAR; INTRAVENOUS ONCE
Refills: 0 | Status: COMPLETED | OUTPATIENT
Start: 2019-11-18 | End: 2019-11-18

## 2019-11-18 RX ORDER — TAMSULOSIN HYDROCHLORIDE 0.4 MG/1
0.4 CAPSULE ORAL AT BEDTIME
Refills: 0 | Status: DISCONTINUED | OUTPATIENT
Start: 2019-11-18 | End: 2019-12-04

## 2019-11-18 RX ORDER — SENNA PLUS 8.6 MG/1
2 TABLET ORAL AT BEDTIME
Refills: 0 | Status: DISCONTINUED | OUTPATIENT
Start: 2019-11-18 | End: 2019-12-04

## 2019-11-18 RX ORDER — SODIUM CHLORIDE 9 MG/ML
1000 INJECTION, SOLUTION INTRAVENOUS
Refills: 0 | Status: DISCONTINUED | OUTPATIENT
Start: 2019-11-18 | End: 2019-11-26

## 2019-11-18 RX ORDER — POLYETHYLENE GLYCOL 3350 17 G/17G
17 POWDER, FOR SOLUTION ORAL AT BEDTIME
Refills: 0 | Status: DISCONTINUED | OUTPATIENT
Start: 2019-11-18 | End: 2019-12-04

## 2019-11-18 RX ORDER — ATORVASTATIN CALCIUM 80 MG/1
10 TABLET, FILM COATED ORAL AT BEDTIME
Refills: 0 | Status: DISCONTINUED | OUTPATIENT
Start: 2019-11-18 | End: 2019-12-04

## 2019-11-18 RX ORDER — PIPERACILLIN AND TAZOBACTAM 4; .5 G/20ML; G/20ML
3.38 INJECTION, POWDER, LYOPHILIZED, FOR SOLUTION INTRAVENOUS EVERY 8 HOURS
Refills: 0 | Status: DISCONTINUED | OUTPATIENT
Start: 2019-11-18 | End: 2019-11-25

## 2019-11-18 RX ADMIN — ERTAPENEM SODIUM 1000 MILLIGRAM(S): 1 INJECTION, POWDER, LYOPHILIZED, FOR SOLUTION INTRAMUSCULAR; INTRAVENOUS at 20:00

## 2019-11-18 RX ADMIN — ERTAPENEM SODIUM 120 MILLIGRAM(S): 1 INJECTION, POWDER, LYOPHILIZED, FOR SOLUTION INTRAMUSCULAR; INTRAVENOUS at 18:40

## 2019-11-18 RX ADMIN — SODIUM CHLORIDE 100 MILLILITER(S): 9 INJECTION, SOLUTION INTRAVENOUS at 17:38

## 2019-11-18 RX ADMIN — RIVASTIGMINE 1 PATCH: 4.6 PATCH, EXTENDED RELEASE TRANSDERMAL at 23:00

## 2019-11-18 RX ADMIN — PIPERACILLIN AND TAZOBACTAM 200 GRAM(S): 4; .5 INJECTION, POWDER, LYOPHILIZED, FOR SOLUTION INTRAVENOUS at 23:01

## 2019-11-18 NOTE — ED PROVIDER NOTE - PHYSICAL EXAMINATION
General: awake, alert to name/, not location/situation  HEENT: PERRLA EOMI. No trauma/bruising noted to head or face. No lip/tongue/throat swelling noted on exam.  CV: Regular rate and rhythm, S1/S2, +MURMUR. No tenderness to palpation to chest wall.  Lungs: Clear to ascultation bilaterally, no wheezes/crackles/rales noted on exam. Equal chest wall excursion noted.   Abdomen: Soft, non tender, non distended, no guarding or rebound.   MSK: Grossly intact ROM of upper and lower extremities bilaterally. No tenderness to palpation to extremities. No gross deformities noted to extremities.  Neuro: Awake, oriented to name/, not oriented to situation/context. moving all extremities spontaneously. neuro exam otherwise limited due to AMS/mental status.   Extremities: No swelling or edema noted to extremities. No calf tenderness to palpation.   Skin: No rash noted on exam.

## 2019-11-18 NOTE — H&P ADULT - PROBLEM SELECTOR PLAN 1
will start Patient on zosyn and await cultures  follow urine output with a hx of BPH  will continue flomax and finesteride

## 2019-11-18 NOTE — ED PROVIDER NOTE - ATTENDING CONTRIBUTION TO CARE
attending Kaylene: 89yM h/o dementia, CAD with 2 stents, TIA on plavix, Afib not on AC, HLD, HTN, frequent UTIs presents from home with AMS. Dropped off by son in triage. Reportedly being treated for UTI. Pt unable to contribute to history given dementia. Will obtain labs, UA/Ucx, CTH given AMS, admit

## 2019-11-18 NOTE — ED PROVIDER NOTE - OBJECTIVE STATEMENT
History from charting, patient with dementia, no family currently at bedside    89M, pre charting, Hx of CAD s/p PCI x2, TIA on plavix, Atrial Fibrillation on no AC, HLD, HTN, Dementia, multiple UTI presents with son (who left before triage) with report of AMS. Per triage note, patient currently being treated with antibiotics for UTI. Patient reportedly more confused from baseline, no longer ambulating, weaker. No other review of systems known at this time, patient unable to provide further hx. Meds, allergies per charting, otherwise unable to obtain from patient. Patient last admitted 9/13-9/20 due to hematuria, UTI.

## 2019-11-18 NOTE — H&P ADULT - NSHPPHYSICALEXAM_GEN_ALL_CORE
PHYSICAL EXAM:  GENERAL: NAD, well nourished and conversant  HEAD:  Atraumatic  EYES: EOM, PERRLA, conjunctiva pink and sclera white  ENT: No tonsillar erythema, exudates, or enlargement, moist mucous membranes, good dentition, no lesions  NECK: Supple, No JVD, normal thyroid, carotids with normal upstrokes and no bruits  CHEST/LUNG: Clear to auscultation bilaterally, No rales, rhonchi, wheezing, or rubs  HEART: Regular rate and rhythm, No murmurs, rubs, or gallops  ABDOMEN: Soft, nondistended, no masses, guarding, tenderness or rebound, bowel sounds present  EXTREMITIES:  2+ Peripheral Pulses, No clubbing, cyanosis, or edema.   LYMPH: No lymphadenopathy noted  SKIN: No rashes or lesions  NERVOUS SYSTEM: lethargic but responding to commands

## 2019-11-18 NOTE — ED PROVIDER NOTE - CLINICAL SUMMARY MEDICAL DECISION MAKING FREE TEXT BOX
89M, pre charting, Hx of CAD s/p PCI x2, TIA on plavix, Atrial Fibrillation on no AC, HLD, HTN, Dementia, multiple UTI presents w change in mental status, currently being treated for UTI. Exam as above. Concern for UTi / sepsis as etiology of AMS. Will check labs, CTH, CXR for further assessment. Currently stable, no acute distress. Will continue to follow up and re-assess. Case discussed with Attending.  Naveed Gates MD, PGY3 Emergency Medicine

## 2019-11-18 NOTE — ED ADULT NURSE REASSESSMENT NOTE - NS ED NURSE REASSESS COMMENT FT1
Fingerstick 94. Rectal temp 96F. Kaylene TABARES notified, bear hugger to be applied. Straight cath performed by 2 ED RN using sterile technique, 500mL cloudy dark yellow urine drained. Urine specimen sent to lab. Fingerstick 94. Rectal temp 96F. Kaylene TABARES notified, bear hugger to be applied. Straight cath performed by 2 ED RN using sterile technique, 500mL cloudy/dark/foul smelling yellow urine drained. Urine specimen sent to lab. Old diaper was fully saturated. Pt changed and repositioned for comfort.

## 2019-11-18 NOTE — ED ADULT NURSE NOTE - OBJECTIVE STATEMENT
89y male brought into ED by son c/o confusion. Son drop off pt and left. Pt is poor historian. Son relay to triage RN pt present for "confusion, currently treated for UTI (ampicillin and keflex), "ecoli in urine culture", "was babbling on sunday...not talking today", +hx dementia, not following commands, (at baseline: +conversive, +amb with walker)". Upon exam, pt A&Ox2 (disoriented to location) and lethargic with clear speech. Pt following some commands. PERRLA 3mm, face symmetrical, MEAx4. Respirations even, unlabored and clear. Abdomen rounded and distended. Pt guarding upon palpation. +2 lower extremity edema noted.

## 2019-11-18 NOTE — ED PROVIDER NOTE - CARE PLAN
Principal Discharge DX:	UTI (urinary tract infection)  Secondary Diagnosis:	AMS (altered mental status)

## 2019-11-18 NOTE — ED PROVIDER NOTE - PROGRESS NOTE DETAILS
CXR wnl. UA with UTI. WBC 11 (double from prior baseline). CT head pending. Suspicion for AMS secondary to UTI. Will plan to admit to Dr Franco - salvatore service, Dr Sanders calling, awaiting callback.   Naveed Gates MD, PGY3 Emergency Medicine Spoke with daughter who confirmed that patient was recently dx and treated out-pt for UTI, but had become more confused / altered over last 1-2 days. Informed her that patient will be admitted for further care, she is in agreement w plan  Naveed Gates MD, PGY3 Emergency Medicine

## 2019-11-18 NOTE — H&P ADULT - ASSESSMENT
89 yo male recently admitted to [facility] for hematuria presents with a change in mental status. Patient found to be very lethargic and unable to ambulate. In ED Patient was found to have a + UA with suggested Urinary tract infection

## 2019-11-18 NOTE — H&P ADULT - NSHPLABSRESULTS_GEN_ALL_CORE
10.3   11.07 )-----------( 207      ( 2019 16:53 )             31.1           137  |  105  |  30<H>  ----------------------------<  94  4.9   |  22  |  1.18    Ca    9.2      2019 16:53    TPro  7.2  /  Alb  3.6  /  TBili  0.2  /  DBili  x   /  AST  37  /  ALT  35  /  AlkPhos  75                Urinalysis Basic - ( 2019 17:38 )    Color: Light Orange / Appearance: Turbid / S.020 / pH: x  Gluc: x / Ketone: Negative  / Bili: Negative / Urobili: 2 mg/dL   Blood: x / Protein: 100 / Nitrite: Positive   Leuk Esterase: Large / RBC: >50 /hpf / WBC >50 /HPF   Sq Epi: x / Non Sq Epi: 1 / Bacteria: Many            Lactate Trend            CAPILLARY BLOOD GLUCOSE      POCT Blood Glucose.: 94 mg/dL (2019 16:29)

## 2019-11-18 NOTE — H&P ADULT - HISTORY OF PRESENT ILLNESS
89M, pre charting, Hx of CAD s/p PCI x2, TIA on plavix, Atrial Fibrillation on no AC, HLD, HTN, Dementia, multiple UTI presents with son (who left before triage) with report of AMS. Per triage note, patient currently being treated with antibiotics for UTI. Patient reportedly more confused from baseline, no longer ambulating, weaker. No other review of systems known at this time, patient unable to provide further hx. Meds, allergies per charting, otherwise unable to obtain from patient. Patient last admitted 9/13-9/20 due to hematuria, UTI. Patient found to have a positive UA in ED. admitted for further treatment

## 2019-11-18 NOTE — ED ADULT TRIAGE NOTE - CHIEF COMPLAINT QUOTE
confusion, currently treated for UTI (ampicillin and keflex), "ecoli in urine culture", "was babbling on sunday...not talking today", +hx dementia, not following commands, (at baseline: +conversive, +amb with walker)

## 2019-11-19 LAB
ANION GAP SERPL CALC-SCNC: 12 MMOL/L — SIGNIFICANT CHANGE UP (ref 5–17)
BUN SERPL-MCNC: 26 MG/DL — HIGH (ref 7–23)
CALCIUM SERPL-MCNC: 9 MG/DL — SIGNIFICANT CHANGE UP (ref 8.4–10.5)
CHLORIDE SERPL-SCNC: 106 MMOL/L — SIGNIFICANT CHANGE UP (ref 96–108)
CO2 SERPL-SCNC: 24 MMOL/L — SIGNIFICANT CHANGE UP (ref 22–31)
CREAT SERPL-MCNC: 1.32 MG/DL — HIGH (ref 0.5–1.3)
GLUCOSE SERPL-MCNC: 93 MG/DL — SIGNIFICANT CHANGE UP (ref 70–99)
HCT VFR BLD CALC: 29.5 % — LOW (ref 39–50)
HGB BLD-MCNC: 9.7 G/DL — LOW (ref 13–17)
LACTATE SERPL-SCNC: 1 MMOL/L — SIGNIFICANT CHANGE UP (ref 0.7–2)
MAGNESIUM SERPL-MCNC: 1.9 MG/DL — SIGNIFICANT CHANGE UP (ref 1.6–2.6)
MCHC RBC-ENTMCNC: 30.3 PG — SIGNIFICANT CHANGE UP (ref 27–34)
MCHC RBC-ENTMCNC: 32.9 GM/DL — SIGNIFICANT CHANGE UP (ref 32–36)
MCV RBC AUTO: 92.2 FL — SIGNIFICANT CHANGE UP (ref 80–100)
NRBC # BLD: 0 /100 WBCS — SIGNIFICANT CHANGE UP (ref 0–0)
PLATELET # BLD AUTO: 166 K/UL — SIGNIFICANT CHANGE UP (ref 150–400)
POTASSIUM SERPL-MCNC: 4.4 MMOL/L — SIGNIFICANT CHANGE UP (ref 3.5–5.3)
POTASSIUM SERPL-SCNC: 4.4 MMOL/L — SIGNIFICANT CHANGE UP (ref 3.5–5.3)
RBC # BLD: 3.2 M/UL — LOW (ref 4.2–5.8)
RBC # FLD: 15 % — HIGH (ref 10.3–14.5)
SODIUM SERPL-SCNC: 142 MMOL/L — SIGNIFICANT CHANGE UP (ref 135–145)
WBC # BLD: 7.37 K/UL — SIGNIFICANT CHANGE UP (ref 3.8–10.5)
WBC # FLD AUTO: 7.37 K/UL — SIGNIFICANT CHANGE UP (ref 3.8–10.5)

## 2019-11-19 RX ADMIN — PIPERACILLIN AND TAZOBACTAM 25 GRAM(S): 4; .5 INJECTION, POWDER, LYOPHILIZED, FOR SOLUTION INTRAVENOUS at 21:43

## 2019-11-19 RX ADMIN — PIPERACILLIN AND TAZOBACTAM 25 GRAM(S): 4; .5 INJECTION, POWDER, LYOPHILIZED, FOR SOLUTION INTRAVENOUS at 13:14

## 2019-11-19 RX ADMIN — RIVASTIGMINE 1 PATCH: 4.6 PATCH, EXTENDED RELEASE TRANSDERMAL at 23:19

## 2019-11-19 RX ADMIN — RIVASTIGMINE 1 PATCH: 4.6 PATCH, EXTENDED RELEASE TRANSDERMAL at 23:00

## 2019-11-19 RX ADMIN — SODIUM CHLORIDE 100 MILLILITER(S): 9 INJECTION, SOLUTION INTRAVENOUS at 13:04

## 2019-11-19 RX ADMIN — ATORVASTATIN CALCIUM 10 MILLIGRAM(S): 80 TABLET, FILM COATED ORAL at 21:43

## 2019-11-19 RX ADMIN — RIVASTIGMINE 1 PATCH: 4.6 PATCH, EXTENDED RELEASE TRANSDERMAL at 07:17

## 2019-11-19 RX ADMIN — TAMSULOSIN HYDROCHLORIDE 0.4 MILLIGRAM(S): 0.4 CAPSULE ORAL at 21:43

## 2019-11-19 RX ADMIN — RIVASTIGMINE 1 PATCH: 4.6 PATCH, EXTENDED RELEASE TRANSDERMAL at 19:54

## 2019-11-19 RX ADMIN — PIPERACILLIN AND TAZOBACTAM 25 GRAM(S): 4; .5 INJECTION, POWDER, LYOPHILIZED, FOR SOLUTION INTRAVENOUS at 05:28

## 2019-11-19 RX ADMIN — SENNA PLUS 2 TABLET(S): 8.6 TABLET ORAL at 21:43

## 2019-11-19 NOTE — PROGRESS NOTE ADULT - ASSESSMENT
Patient is obtunded and unresponsive today. Arms and legs are rigid. 4+grasp and snout reflexes. On Zosyn and IV hydration with worsening obtundation. Afebrile.

## 2019-11-19 NOTE — PROGRESS NOTE ADULT - SUBJECTIVE AND OBJECTIVE BOX
Patient is a 89y old  Male who presents with a chief complaint of change in mental status (2019 22:00)        MEDICATIONS  (STANDING):  atorvastatin 10 milliGRAM(s) Oral at bedtime  dextrose 5% + sodium chloride 0.45%. 1000 milliLiter(s) (100 mL/Hr) IV Continuous <Continuous>  finasteride 5 milliGRAM(s) Oral daily  piperacillin/tazobactam IVPB.. 3.375 Gram(s) IV Intermittent every 8 hours  rivastigmine patch  9.5 mG/24 Hr(s) 1 Patch Transdermal every 24 hours  senna 2 Tablet(s) Oral at bedtime  tamsulosin 0.4 milliGRAM(s) Oral at bedtime    MEDICATIONS  (PRN):  acetaminophen    Suspension .. 650 milliGRAM(s) Oral every 4 hours PRN Temp greater or equal to 38C (100.4F), Mild Pain (1 - 3)  polyethylene glycol 3350 17 Gram(s) Oral at bedtime PRN Constipation      Allergies    No Known Allergies    Intolerances    morphine (Unknown)    VITALS:   T(C): 36.8 (19 @ 04:32), Max: 36.8 (19 @ 04:32)  HR: 65 (19 @ 04:32) (58 - 74)  BP: 125/74 (19 @ 04:32) (113/59 - 156/94)  RR: 18 (19 @ 04:32) (15 - 19)  SpO2: 95% (19 @ 04:32) (95% - 99%)  Wt(kg): --    PHYSICAL EXAM:  GENERAL: NAD, well nourished and conversant  HEAD:  Atraumatic  EYES: EOM, PERRLA, conjunctiva pink and sclera white  ENT: No tonsillar erythema, exudates, or enlargement, moist mucous membranes, good dentition, no lesions  NECK: Supple, No JVD, normal thyroid, carotids with normal upstrokes and no bruits  CHEST/LUNG: Clear to auscultation bilaterally, No rales, rhonchi, wheezing, or rubs  HEART: Regular rate and rhythm, No murmurs, rubs, or gallops  ABDOMEN: Soft, nondistended, no masses, guarding, tenderness or rebound, bowel sounds present  EXTREMITIES:  2+ Peripheral Pulses, No clubbing, cyanosis, or edema. No arthritis of shoulders, elbows, hands, hips, knees, ankles, or feet. No DJD C spine, T spine, or L/S spine  LYMPH: No lymphadenopathy noted  SKIN: No rashes or lesions  NERVOUS SYSTEM:  Alert & Oriented X3, normal cognitive function. Motor Strength 5/5 right upper and right lower.  5/5 left upper and left lower extremities, DTRs 2+ intact and symmetric    LABS:        CBC Full  -  ( 2019 06:55 )  WBC Count : 7.37 K/uL  RBC Count : 3.20 M/uL  Hemoglobin : 9.7 g/dL  Hematocrit : 29.5 %  Platelet Count - Automated : 166 K/uL  Mean Cell Volume : 92.2 fl  Mean Cell Hemoglobin : 30.3 pg  Mean Cell Hemoglobin Concentration : 32.9 gm/dL  Auto Neutrophil # : x  Auto Lymphocyte # : x  Auto Monocyte # : x  Auto Eosinophil # : x  Auto Basophil # : x  Auto Neutrophil % : x  Auto Lymphocyte % : x  Auto Monocyte % : x  Auto Eosinophil % : x  Auto Basophil % : x    11-19    142  |  106  |  26<H>  ----------------------------<  93  4.4   |  24  |  1.32<H>    Ca    9.0      2019 06:53  Mg     1.9     -    TPro  7.2  /  Alb  3.6  /  TBili  0.2  /  DBili  x   /  AST  37  /  ALT  35  /  AlkPhos  75  -    LIVER FUNCTIONS - ( 2019 16:53 )  Alb: 3.6 g/dL / Pro: 7.2 g/dL / ALK PHOS: 75 U/L / ALT: 35 U/L / AST: 37 U/L / GGT: x             Urinalysis Basic - ( 2019 17:38 )    Color: Light Orange / Appearance: Turbid / S.020 / pH: x  Gluc: x / Ketone: Negative  / Bili: Negative / Urobili: 2 mg/dL   Blood: x / Protein: 100 / Nitrite: Positive   Leuk Esterase: Large / RBC: >50 /hpf / WBC >50 /HPF   Sq Epi: x / Non Sq Epi: 1 / Bacteria: Many      CAPILLARY BLOOD GLUCOSE      POCT Blood Glucose.: 94 mg/dL (2019 16:29)      RADIOLOGY & ADDITIONAL TESTS:      Consultant(s):    Care Discussed with Consultants/Other Providers [ ] YES  [ ] NO Patient is a 89y old  Male who presents with a chief complaint of change in mental status  Hx of CAD s/p PCI x2, TIA on plavix, Atrial Fibrillation on no AC, HLD, HTN, Dementia, multiple UTI presents with son (who left before triage) with report of AMS. Per triage note, patient currently being treated with antibiotics for UTI. Patient reportedly more confused from baseline, no longer ambulating, weaker. No other review of systems known at this time, patient unable to provide further hx. Meds, allergies per charting, otherwise unable to obtain from patient. Patient last admitted - due to hematuria, UTI. Patient found to have a positive UA in ED. Patient is obtunded and unresponsive today. Arms and legs are rigid. 4+_ grasp and snout reflexes. on Zosyn and IV hydration with worsening obtundation. Afebrile.        MEDICATIONS  (STANDING):  atorvastatin 10 milliGRAM(s) Oral at bedtime  dextrose 5% + sodium chloride 0.45%. 1000 milliLiter(s) (100 mL/Hr) IV Continuous <Continuous>  finasteride 5 milliGRAM(s) Oral daily  piperacillin/tazobactam IVPB.. 3.375 Gram(s) IV Intermittent every 8 hours  rivastigmine patch  9.5 mG/24 Hr(s) 1 Patch Transdermal every 24 hours  senna 2 Tablet(s) Oral at bedtime  tamsulosin 0.4 milliGRAM(s) Oral at bedtime    MEDICATIONS  (PRN):  acetaminophen    Suspension .. 650 milliGRAM(s) Oral every 4 hours PRN Temp greater or equal to 38C (100.4F), Mild Pain (1 - 3)  polyethylene glycol 3350 17 Gram(s) Oral at bedtime PRN Constipation      Allergies    No Known Allergies    Intolerances    morphine (Unknown)    VITALS:   T(C): 36.8 (19 @ 04:32), Max: 36.8 (19 @ 04:32)  HR: 65 (19 @ 04:32) (58 - 74)  BP: 125/74 (19 @ 04:32) (113/59 - 156/94)  RR: 18 (19 @ 04:32) (15 - 19)  SpO2: 95% (19 @ 04:32) (95% - 99%)  Wt(kg): --    PHYSICAL EXAM:  GENERAL: NAD, well nourished and conversant  HEAD:  Atraumatic  EYES: EOM, PERRLA, conjunctiva pink and sclera white  ENT: No tonsillar erythema, exudates, or enlargement, moist mucous membranes, good dentition, no lesions  NECK: Supple, No JVD, normal thyroid, carotids with normal upstrokes and no bruits  CHEST/LUNG: Clear to auscultation bilaterally, No rales, rhonchi, wheezing, or rubs  HEART: Regular rate and rhythm, No murmurs, rubs, or gallops  ABDOMEN: Soft, nondistended, no masses, guarding, tenderness or rebound, bowel sounds present  EXTREMITIES:  2+ Peripheral Pulses, No clubbing, cyanosis, or edema. No arthritis of shoulders, elbows, hands, hips, knees, ankles, or feet. No DJD C spine, T spine, or L/S spine  LYMPH: No lymphadenopathy noted  SKIN: No rashes or lesions  NERVOUS SYSTEM:  Alert & Oriented X3, normal cognitive function. Motor Strength 5/5 right upper and right lower.  5/5 left upper and left lower extremities, DTRs 2+ intact and symmetric    LABS:        CBC Full  -  ( 2019 06:55 )  WBC Count : 7.37 K/uL  RBC Count : 3.20 M/uL  Hemoglobin : 9.7 g/dL  Hematocrit : 29.5 %  Platelet Count - Automated : 166 K/uL  Mean Cell Volume : 92.2 fl  Mean Cell Hemoglobin : 30.3 pg  Mean Cell Hemoglobin Concentration : 32.9 gm/dL  Auto Neutrophil # : x  Auto Lymphocyte # : x  Auto Monocyte # : x  Auto Eosinophil # : x  Auto Basophil # : x  Auto Neutrophil % : x  Auto Lymphocyte % : x  Auto Monocyte % : x  Auto Eosinophil % : x  Auto Basophil % : x        142  |  106  |  26<H>  ----------------------------<  93  4.4   |  24  |  1.32<H>    Ca    9.0      2019 06:53  Mg     1.9         TPro  7.2  /  Alb  3.6  /  TBili  0.2  /  DBili  x   /  AST  37  /  ALT  35  /  AlkPhos  75  11-18    LIVER FUNCTIONS - ( 2019 16:53 )  Alb: 3.6 g/dL / Pro: 7.2 g/dL / ALK PHOS: 75 U/L / ALT: 35 U/L / AST: 37 U/L / GGT: x             Urinalysis Basic - ( 2019 17:38 )    Color: Light Orange / Appearance: Turbid / S.020 / pH: x  Gluc: x / Ketone: Negative  / Bili: Negative / Urobili: 2 mg/dL   Blood: x / Protein: 100 / Nitrite: Positive   Leuk Esterase: Large / RBC: >50 /hpf / WBC >50 /HPF   Sq Epi: x / Non Sq Epi: 1 / Bacteria: Many      CAPILLARY BLOOD GLUCOSE      POCT Blood Glucose.: 94 mg/dL (2019 16:29)      RADIOLOGY & ADDITIONAL TESTS:      Consultant(s):    Care Discussed with Consultants/Other Providers [ ] YES  [ ] NO

## 2019-11-19 NOTE — CONSULT NOTE ADULT - ASSESSMENT
Assessment: 89yo man history of dementia, HTN, HLD, afib not on AC (?from prior ghada hematuria, unclear why not on AC), multiple UTIs admitted for altered mental status, Neurology consult for possible neurological causes for altered mental status. Neurological examination demonstrates waxing and waning mental status with asterixis and no focal neurological findings. CTH noncon negative for acute intracranial pathology.    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 and during examination was resisting moreso than rigidity, low suspicion of cholinergic crisis (patient was on exelon)    Plan:  Imaging/Studies:   [ ] would defer obtaining brain MRI at this time  [ ] routine EEG  [ ] CTA head & neck w/w/o contrast  [ ] telemetry monitoring    Labs:   [ ] if spikes fever, should have repeat UA, UC, blood cultures  [ ] vit B12, folate - already obtained  [ ] may consider HIV, RPR serum  [ ] at this time may defer consideration of LP    Meds:   [ ] UTI treatment per primary team/ID    Consults:   [ ] may consider toxicology consult if concern is for toxidrome, which there is low suspicion    -Management & disposition to be discussed in AM with Dr. Aguillon

## 2019-11-19 NOTE — CONSULT NOTE ADULT - SUBJECTIVE AND OBJECTIVE BOX
HPI: 89yo man history of dementia, HTN, HLD, afib not on AC (?from prior ghada hematuria, unclear why not on AC), multiple UTIs admitted for altered mental status, Neurology consult for possible neurological causes for altered mental status. No family at bedside. Patient unable to tell me why he is in the hospital. Chart reviewed, patient currently undergoing treatment for UTI.     HPI (from primary team)  89M, pre charting, Hx of CAD s/p PCI x2, TIA on plavix, Atrial Fibrillation on no AC, HLD, HTN, Dementia, multiple UTI presents with son (who left before triage) with report of AMS. Per triage note, patient currently being treated with antibiotics for UTI. Patient reportedly more confused from baseline, no longer ambulating, weaker. No other review of systems known at this time, patient unable to provide further hx. Meds, allergies per charting, otherwise unable to obtain from patient. Patient last admitted - due to hematuria, UTI. Patient found to have a positive UA in ED. admitted for further treatment (2019 22:00)    REVIEW OF SYSTEMS    A 10-system ROS was performed and is negative except for those items noted above and/or in the HPI.    PAST MEDICAL & SURGICAL HISTORY:  Atrial fibrillation  UTI (urinary tract infection): MDR E.coli  Back pain  Fall  Dementia  Hypercholesteremia  HTN - Hypertension  CAD (Coronary Artery Disease)  S/P percutaneous transluminal coronary angioplasty: PCI: stent x2  h/o Hernia Repair    FAMILY HISTORY:  No pertinent family history in first degree relatives    MEDICATIONS  (STANDING):  atorvastatin 10 milliGRAM(s) Oral at bedtime  dextrose 5% + sodium chloride 0.45%. 1000 milliLiter(s) (100 mL/Hr) IV Continuous <Continuous>  finasteride 5 milliGRAM(s) Oral daily  piperacillin/tazobactam IVPB.. 3.375 Gram(s) IV Intermittent every 8 hours  rivastigmine patch  9.5 mG/24 Hr(s) 1 Patch Transdermal every 24 hours  senna 2 Tablet(s) Oral at bedtime  tamsulosin 0.4 milliGRAM(s) Oral at bedtime    MEDICATIONS  (PRN):  acetaminophen    Suspension .. 650 milliGRAM(s) Oral every 4 hours PRN Temp greater or equal to 38C (100.4F), Mild Pain (1 - 3)  polyethylene glycol 3350 17 Gram(s) Oral at bedtime PRN Constipation    ALLERGIES/INTOLERANCES:  Allergies  No Known Allergies    Intolerances  morphine (Unknown)    VITALS & EXAMINATION:  Vital Signs Last 24 Hrs  T(C): 36.9 (2019 13:01), Max: 36.9 (2019 13:01)  T(F): 98.4 (2019 13:01), Max: 98.4 (2019 13:01)  HR: 74 (2019 13:) (65 - 74)  BP: 137/81 (2019 13:) (125/74 - 143/82)  BP(mean): --  RR: 18 (:) (18 - 18)  SpO2: 95% (:) (95% - 96%)    Neurological (>12):  MS: eves closed, opens eyes, though not fully (symmetrically) to verbal stimuli, patient cannot tell me he is in the hospital but can tell me his age, moderate dysarthria where I cannot understand his answer when asking him what state we are in, patient mumbles when asking questions regarding naming and repetition. patient can follow crossed commands - he was able to show me two fingers on his left hand and touch his right shoulder; though when i asked him with his right hand to touch his left shoulder, patient moved his right hand and touched his right shoulder.    CNs: PERRLA (R = 3mm, L = 3mm). VFF intact btt b/l. EOM testing demonstrates no nystagmus. V1-3 intact to LT/pinprick, well developed masseter muscles b/l. No facial asymmetry b/l, full eye closure strength b/l. Hearing grossly normal (rubbing fingers) b/l. Symmetric palate elevation in midline. Gag reflex deferred. Head turning & shoulder shrug intact b/l. Tongue midline, normal movements, no atrophy.     Motor: Normal muscle bulk, patient tends to resist when moving his arms, but also noted increased tone diffusely. 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  unable to perform manual motor testing	     Sensation: Intact to LT b/l throughout.     Cortical: Extinction on DSS (neglect): none    Coordination: patient would not participate in FTN testing    Gait: deferred    LABORATORY:  CBC                       9.7    7.37  )-----------( 166      ( 2019 06:55 )             29.5     Chem     142  |  106  |  26<H>  ----------------------------<  93  4.4   |  24  |  1.32<H>    Ca    9.0      2019 06:53  Mg     1.9         TPro  7.2  /  Alb  3.6  /  TBili  0.2  /  DBili  x   /  AST  37  /  ALT  35  /  AlkPhos  75      LFTs LIVER FUNCTIONS - ( 2019 16:53 )  Alb: 3.6 g/dL / Pro: 7.2 g/dL / ALK PHOS: 75 U/L / ALT: 35 U/L / AST: 37 U/L / GGT: x           Coagulopathy   Lipid Panel   A1c   Cardiac enzymes     U/A Urinalysis Basic - ( 2019 17:38 )    Color: Light Orange / Appearance: Turbid / S.020 / pH: x  Gluc: x / Ketone: Negative  / Bili: Negative / Urobili: 2 mg/dL   Blood: x / Protein: 100 / Nitrite: Positive   Leuk Esterase: Large / RBC: >50 /hpf / WBC >50 /HPF   Sq Epi: x / Non Sq Epi: 1 / Bacteria: Many    STUDIES & IMAGING:  Studies (EKG, EEG, EMG, etc):     Radiology (XR, CT, MR, U/S, TTE/UMER):    < from: CT Head No Cont (19 @ 18:13) >  IMPRESSION:    No acute intracranial hemorrhage mass effect or midline shift.    Stable exam.    < end of copied text >

## 2019-11-19 NOTE — PROGRESS NOTE ADULT - ATTENDING COMMENTS
Obtundation with rigidity and advanced dementia. Appears to be toxic metabolic with a negative CT of the brain. To obtain a neurology consult at this time.

## 2019-11-20 LAB
ANION GAP SERPL CALC-SCNC: 14 MMOL/L — SIGNIFICANT CHANGE UP (ref 5–17)
BUN SERPL-MCNC: 18 MG/DL — SIGNIFICANT CHANGE UP (ref 7–23)
CALCIUM SERPL-MCNC: 9 MG/DL — SIGNIFICANT CHANGE UP (ref 8.4–10.5)
CHLORIDE SERPL-SCNC: 103 MMOL/L — SIGNIFICANT CHANGE UP (ref 96–108)
CO2 SERPL-SCNC: 25 MMOL/L — SIGNIFICANT CHANGE UP (ref 22–31)
CREAT SERPL-MCNC: 1.57 MG/DL — HIGH (ref 0.5–1.3)
CULTURE RESULTS: SIGNIFICANT CHANGE UP
GLUCOSE SERPL-MCNC: 99 MG/DL — SIGNIFICANT CHANGE UP (ref 70–99)
HCT VFR BLD CALC: 30.5 % — LOW (ref 39–50)
HGB BLD-MCNC: 9.8 G/DL — LOW (ref 13–17)
HIV 1+2 AB+HIV1 P24 AG SERPL QL IA: SIGNIFICANT CHANGE UP
MCHC RBC-ENTMCNC: 30.1 PG — SIGNIFICANT CHANGE UP (ref 27–34)
MCHC RBC-ENTMCNC: 32.1 GM/DL — SIGNIFICANT CHANGE UP (ref 32–36)
MCV RBC AUTO: 93.6 FL — SIGNIFICANT CHANGE UP (ref 80–100)
NRBC # BLD: 0 /100 WBCS — SIGNIFICANT CHANGE UP (ref 0–0)
PLATELET # BLD AUTO: 183 K/UL — SIGNIFICANT CHANGE UP (ref 150–400)
POTASSIUM SERPL-MCNC: 4.2 MMOL/L — SIGNIFICANT CHANGE UP (ref 3.5–5.3)
POTASSIUM SERPL-SCNC: 4.2 MMOL/L — SIGNIFICANT CHANGE UP (ref 3.5–5.3)
RBC # BLD: 3.26 M/UL — LOW (ref 4.2–5.8)
RBC # FLD: 15 % — HIGH (ref 10.3–14.5)
SODIUM SERPL-SCNC: 142 MMOL/L — SIGNIFICANT CHANGE UP (ref 135–145)
SPECIMEN SOURCE: SIGNIFICANT CHANGE UP
T PALLIDUM AB TITR SER: NEGATIVE — SIGNIFICANT CHANGE UP
WBC # BLD: 6.24 K/UL — SIGNIFICANT CHANGE UP (ref 3.8–10.5)
WBC # FLD AUTO: 6.24 K/UL — SIGNIFICANT CHANGE UP (ref 3.8–10.5)

## 2019-11-20 PROCEDURE — 99222 1ST HOSP IP/OBS MODERATE 55: CPT | Mod: GC

## 2019-11-20 PROCEDURE — 70498 CT ANGIOGRAPHY NECK: CPT | Mod: 26

## 2019-11-20 PROCEDURE — 70496 CT ANGIOGRAPHY HEAD: CPT | Mod: 26

## 2019-11-20 RX ADMIN — RIVASTIGMINE 1 PATCH: 4.6 PATCH, EXTENDED RELEASE TRANSDERMAL at 19:00

## 2019-11-20 RX ADMIN — RIVASTIGMINE 1 PATCH: 4.6 PATCH, EXTENDED RELEASE TRANSDERMAL at 22:55

## 2019-11-20 RX ADMIN — TAMSULOSIN HYDROCHLORIDE 0.4 MILLIGRAM(S): 0.4 CAPSULE ORAL at 21:48

## 2019-11-20 RX ADMIN — SENNA PLUS 2 TABLET(S): 8.6 TABLET ORAL at 21:49

## 2019-11-20 RX ADMIN — PIPERACILLIN AND TAZOBACTAM 25 GRAM(S): 4; .5 INJECTION, POWDER, LYOPHILIZED, FOR SOLUTION INTRAVENOUS at 05:51

## 2019-11-20 RX ADMIN — PIPERACILLIN AND TAZOBACTAM 25 GRAM(S): 4; .5 INJECTION, POWDER, LYOPHILIZED, FOR SOLUTION INTRAVENOUS at 21:47

## 2019-11-20 RX ADMIN — FINASTERIDE 5 MILLIGRAM(S): 5 TABLET, FILM COATED ORAL at 14:14

## 2019-11-20 RX ADMIN — RIVASTIGMINE 1 PATCH: 4.6 PATCH, EXTENDED RELEASE TRANSDERMAL at 07:15

## 2019-11-20 RX ADMIN — ATORVASTATIN CALCIUM 10 MILLIGRAM(S): 80 TABLET, FILM COATED ORAL at 21:49

## 2019-11-20 RX ADMIN — PIPERACILLIN AND TAZOBACTAM 25 GRAM(S): 4; .5 INJECTION, POWDER, LYOPHILIZED, FOR SOLUTION INTRAVENOUS at 14:09

## 2019-11-20 RX ADMIN — SODIUM CHLORIDE 100 MILLILITER(S): 9 INJECTION, SOLUTION INTRAVENOUS at 21:49

## 2019-11-20 NOTE — PROGRESS NOTE ADULT - SUBJECTIVE AND OBJECTIVE BOX
Patient is a 89y old  Male who presents with a chief complaint of change in mental status  Hx of CAD s/p PCI x2, TIA on plavix, Atrial Fibrillation on no AC, HLD, HTN, Dementia, multiple UTI presents with son (who left before triage) with report of AMS. Per triage note, patient currently being treated with antibiotics for UTI. Patient reportedly more confused from baseline, no longer ambulating, weaker. No other review of systems known at this time, patient unable to provide further hx. Meds, allergies per charting, otherwise unable to obtain from patient. Patient last admitted 9/13-9/20 due to hematuria, UTI. Patient found to have a positive UA in ED.   Pt more aware and responsive today moving all extremities.       MEDICATIONS  (STANDING):  atorvastatin 10 milliGRAM(s) Oral at bedtime  dextrose 5% + sodium chloride 0.45%. 1000 milliLiter(s) (100 mL/Hr) IV Continuous <Continuous>  finasteride 5 milliGRAM(s) Oral daily  piperacillin/tazobactam IVPB.. 3.375 Gram(s) IV Intermittent every 8 hours  rivastigmine patch  9.5 mG/24 Hr(s) 1 Patch Transdermal every 24 hours  senna 2 Tablet(s) Oral at bedtime  tamsulosin 0.4 milliGRAM(s) Oral at bedtime    MEDICATIONS  (PRN):  acetaminophen    Suspension .. 650 milliGRAM(s) Oral every 4 hours PRN Temp greater or equal to 38C (100.4F), Mild Pain (1 - 3)  polyethylene glycol 3350 17 Gram(s) Oral at bedtime PRN Constipation          VITALS:   T(C): 36.4 (11-20-19 @ 20:07), Max: 36.8 (11-20-19 @ 03:39)  HR: 60 (11-20-19 @ 20:07) (59 - 64)  BP: 145/79 (11-20-19 @ 20:07) (145/79 - 172/96)  RR: 18 (11-20-19 @ 20:07) (18 - 18)  SpO2: 95% (11-20-19 @ 20:07) (95% - 96%)  Wt(kg): --    PHYSICAL EXAM:  GENERAL: NAD, well nourished and conversant  HEAD:  Atraumatic  EYES: EOM, PERRLA, conjunctiva pink and sclera white  ENT: No tonsillar erythema, exudates, or enlargement, moist mucous membranes, good dentition, no lesions  NECK: Supple, No JVD, normal thyroid, carotids with normal upstrokes and no bruits  CHEST/LUNG: Clear to auscultation bilaterally, No rales, rhonchi, wheezing, or rubs  HEART: Regular rate and rhythm, No murmurs, rubs, or gallops  ABDOMEN: Soft, nondistended, no masses, guarding, tenderness or rebound, bowel sounds present  EXTREMITIES:  2+ Peripheral Pulses, No clubbing, cyanosis, or edema. No arthritis of shoulders, elbows, hands, hips, knees, ankles, or feet. No DJD C spine, T spine, or L/S spine  LYMPH: No lymphadenopathy noted  SKIN: No rashes or lesions  NERVOUS SYSTEM: lethargic and confused    LABS:        CBC Full  -  ( 20 Nov 2019 07:19 )  WBC Count : 6.24 K/uL  RBC Count : 3.26 M/uL  Hemoglobin : 9.8 g/dL  Hematocrit : 30.5 %  Platelet Count - Automated : 183 K/uL  Mean Cell Volume : 93.6 fl  Mean Cell Hemoglobin : 30.1 pg  Mean Cell Hemoglobin Concentration : 32.1 gm/dL  Auto Neutrophil # : x  Auto Lymphocyte # : x  Auto Monocyte # : x  Auto Eosinophil # : x  Auto Basophil # : x  Auto Neutrophil % : x  Auto Lymphocyte % : x  Auto Monocyte % : x  Auto Eosinophil % : x  Auto Basophil % : x    11-20    142  |  103  |  18  ----------------------------<  99  4.2   |  25  |  1.57<H>    Ca    9.0      20 Nov 2019 07:19  Mg     1.9     11-19            CAPILLARY BLOOD GLUCOSE          RADIOLOGY & ADDITIONAL TESTS:

## 2019-11-20 NOTE — OCCUPATIONAL THERAPY INITIAL EVALUATION ADULT - LIVES WITH, PROFILE
Per care coordination note, pt lives in an independent living apartment with a "full time" HHA who assists with ADLs, pt was able to ambulate with RW & had home PT&OT.

## 2019-11-20 NOTE — OCCUPATIONAL THERAPY INITIAL EVALUATION ADULT - PERTINENT HX OF CURRENT PROBLEM, REHAB EVAL
89M presents with son with report of AMS. Per triage note, patient currently being treated with antibiotics for UTI. Patient reportedly more confused from baseline, no longer ambulating, weaker. Patient last admitted 9/13-9/20 due to hematuria, UTI and d/c'd to [facility], then d/c'd from [facility] to home. Patient found to have a positive UA in ED. admitted for further treatment. SEE BELOW.

## 2019-11-20 NOTE — OCCUPATIONAL THERAPY INITIAL EVALUATION ADULT - ADL RETRAINING, OT EVAL
GOAL: Patient will be independent with UB dressing within 4 weeks GOAL: Patient will perform grooming standing sink level with CGA in 4 weeks

## 2019-11-20 NOTE — PROGRESS NOTE ADULT - ASSESSMENT
89 yo male recently admitted to [facility] for hematuria presents with a change in mental status. Patient found to be very lethargic and unable to ambulate. In ED Patient was found to have a + UA with suggested Urinary tract infection

## 2019-11-20 NOTE — OCCUPATIONAL THERAPY INITIAL EVALUATION ADULT - ADDITIONAL COMMENTS
CONTINUED. CTH noncon negative for acute intracranial pathology. Per neuro, likely toxic metabolic encephalopathy, low suspicion for stroke (though noted patient has atrial fibrillation, is not on AC, and in the setting of infection still on differential), may consider seizure in the setting of infection.

## 2019-11-21 DIAGNOSIS — T83.511D INFECTION AND INFLAMMATORY REACTION DUE TO INDWELLING URETHRAL CATHETER, SUBSEQUENT ENCOUNTER: ICD-10-CM

## 2019-11-21 LAB
ANION GAP SERPL CALC-SCNC: 14 MMOL/L — SIGNIFICANT CHANGE UP (ref 5–17)
BUN SERPL-MCNC: 17 MG/DL — SIGNIFICANT CHANGE UP (ref 7–23)
CALCIUM SERPL-MCNC: 9.2 MG/DL — SIGNIFICANT CHANGE UP (ref 8.4–10.5)
CHLORIDE SERPL-SCNC: 103 MMOL/L — SIGNIFICANT CHANGE UP (ref 96–108)
CO2 SERPL-SCNC: 22 MMOL/L — SIGNIFICANT CHANGE UP (ref 22–31)
CREAT SERPL-MCNC: 1.45 MG/DL — HIGH (ref 0.5–1.3)
GLUCOSE SERPL-MCNC: 111 MG/DL — HIGH (ref 70–99)
HCT VFR BLD CALC: 32.1 % — LOW (ref 39–50)
HGB BLD-MCNC: 10.2 G/DL — LOW (ref 13–17)
MAGNESIUM SERPL-MCNC: 1.9 MG/DL — SIGNIFICANT CHANGE UP (ref 1.6–2.6)
MCHC RBC-ENTMCNC: 29.1 PG — SIGNIFICANT CHANGE UP (ref 27–34)
MCHC RBC-ENTMCNC: 31.8 GM/DL — LOW (ref 32–36)
MCV RBC AUTO: 91.7 FL — SIGNIFICANT CHANGE UP (ref 80–100)
NRBC # BLD: 0 /100 WBCS — SIGNIFICANT CHANGE UP (ref 0–0)
PLATELET # BLD AUTO: 190 K/UL — SIGNIFICANT CHANGE UP (ref 150–400)
POTASSIUM SERPL-MCNC: 4.1 MMOL/L — SIGNIFICANT CHANGE UP (ref 3.5–5.3)
POTASSIUM SERPL-SCNC: 4.1 MMOL/L — SIGNIFICANT CHANGE UP (ref 3.5–5.3)
RBC # BLD: 3.5 M/UL — LOW (ref 4.2–5.8)
RBC # FLD: 14.8 % — HIGH (ref 10.3–14.5)
SODIUM SERPL-SCNC: 139 MMOL/L — SIGNIFICANT CHANGE UP (ref 135–145)
WBC # BLD: 8.25 K/UL — SIGNIFICANT CHANGE UP (ref 3.8–10.5)
WBC # FLD AUTO: 8.25 K/UL — SIGNIFICANT CHANGE UP (ref 3.8–10.5)

## 2019-11-21 PROCEDURE — 93010 ELECTROCARDIOGRAM REPORT: CPT

## 2019-11-21 PROCEDURE — 95816 EEG AWAKE AND DROWSY: CPT | Mod: 26

## 2019-11-21 RX ADMIN — SODIUM CHLORIDE 100 MILLILITER(S): 9 INJECTION, SOLUTION INTRAVENOUS at 05:41

## 2019-11-21 RX ADMIN — SENNA PLUS 2 TABLET(S): 8.6 TABLET ORAL at 22:46

## 2019-11-21 RX ADMIN — PIPERACILLIN AND TAZOBACTAM 25 GRAM(S): 4; .5 INJECTION, POWDER, LYOPHILIZED, FOR SOLUTION INTRAVENOUS at 05:40

## 2019-11-21 RX ADMIN — PIPERACILLIN AND TAZOBACTAM 25 GRAM(S): 4; .5 INJECTION, POWDER, LYOPHILIZED, FOR SOLUTION INTRAVENOUS at 15:01

## 2019-11-21 RX ADMIN — RIVASTIGMINE 1 PATCH: 4.6 PATCH, EXTENDED RELEASE TRANSDERMAL at 22:47

## 2019-11-21 RX ADMIN — RIVASTIGMINE 1 PATCH: 4.6 PATCH, EXTENDED RELEASE TRANSDERMAL at 22:55

## 2019-11-21 RX ADMIN — RIVASTIGMINE 1 PATCH: 4.6 PATCH, EXTENDED RELEASE TRANSDERMAL at 22:46

## 2019-11-21 RX ADMIN — RIVASTIGMINE 1 PATCH: 4.6 PATCH, EXTENDED RELEASE TRANSDERMAL at 07:15

## 2019-11-21 RX ADMIN — SODIUM CHLORIDE 100 MILLILITER(S): 9 INJECTION, SOLUTION INTRAVENOUS at 22:45

## 2019-11-21 RX ADMIN — PIPERACILLIN AND TAZOBACTAM 25 GRAM(S): 4; .5 INJECTION, POWDER, LYOPHILIZED, FOR SOLUTION INTRAVENOUS at 22:46

## 2019-11-21 RX ADMIN — TAMSULOSIN HYDROCHLORIDE 0.4 MILLIGRAM(S): 0.4 CAPSULE ORAL at 22:45

## 2019-11-21 RX ADMIN — ATORVASTATIN CALCIUM 10 MILLIGRAM(S): 80 TABLET, FILM COATED ORAL at 22:46

## 2019-11-21 RX ADMIN — FINASTERIDE 5 MILLIGRAM(S): 5 TABLET, FILM COATED ORAL at 15:01

## 2019-11-21 NOTE — PHYSICAL THERAPY INITIAL EVALUATION ADULT - IMPAIRMENTS CONTRIBUTING IMPAIRED BED MOBILITY, REHAB EVAL
impaired sensory feedback/decreased strength/decreased flexibility/impaired coordination/impaired balance

## 2019-11-21 NOTE — PHYSICAL THERAPY INITIAL EVALUATION ADULT - ACTIVE RANGE OF MOTION EXAMINATION, REHAB EVAL
AAROM at times/bilateral upper extremity Active ROM was WFL (within functional limits)/bilateral  lower extremity Active ROM was WFL (within functional limits)

## 2019-11-21 NOTE — CHART NOTE - NSCHARTNOTEFT_GEN_A_CORE
Pt  seen and examined earlier today for ~ 2 sec  sinus pause on Tele   Denied any SOB or dizziness   12 lead EKG  with no acute changes   DR Olson  Notified  recommends to c/w Tele monitoring ,  if more pauses , will consult cardiology   Kathie Dave NP-C [id number]

## 2019-11-21 NOTE — PHYSICAL THERAPY INITIAL EVALUATION ADULT - IMPAIRED TRANSFERS: SIT/STAND, REHAB EVAL
decreased strength/impaired coordination/impaired balance/decreased flexibility/impaired sensory feedback

## 2019-11-21 NOTE — PHYSICAL THERAPY INITIAL EVALUATION ADULT - ADDITIONAL COMMENTS
Pt is poor historian; home environment and prior level of function to be clarified. No family bedside. Pt is poor historian; home environment and prior level of function to be clarified. No family bedside. 11/26  Pt states he lives at an assisted living.

## 2019-11-21 NOTE — PHYSICAL THERAPY INITIAL EVALUATION ADULT - BALANCE TRAINING, PT EVAL
Goal: Pt will improve balance one half grade to improve performance and safety of transfers in 4 weeks.

## 2019-11-21 NOTE — PROGRESS NOTE ADULT - ASSESSMENT
89 yo male recently admitted to [facility] for hematuria presents with a change in mental status. Patient found to be very lethargic and unable to ambulate. In ED Patient was found to have a + UA with suggested Urinary tract infection. Patient continue to improve with treatment. Will need physical therapy with eventual placement in rehab

## 2019-11-21 NOTE — PROGRESS NOTE ADULT - SUBJECTIVE AND OBJECTIVE BOX
Patient is a 89y old  Male who presents with a chief complaint of change in mental status  Hx of CAD s/p PCI x2, TIA on plavix, Atrial Fibrillation on no AC, HLD, HTN, Dementia, multiple UTI presents with son (who left before triage) with report of AMS. Per triage note, patient currently being treated with antibiotics for UTI. Patient reportedly more confused from baseline, no longer ambulating, weaker. No other review of systems known at this time, patient unable to provide further hx. Meds, allergies per charting, otherwise unable to obtain from patient. Patient last admitted 9/13-9/20 due to hematuria, UTI. Patient found to have a positive UA in ED.   Patient continue to improve with abx.      MEDICATIONS  (STANDING):  atorvastatin 10 milliGRAM(s) Oral at bedtime  dextrose 5% + sodium chloride 0.45%. 1000 milliLiter(s) (100 mL/Hr) IV Continuous <Continuous>  finasteride 5 milliGRAM(s) Oral daily  piperacillin/tazobactam IVPB.. 3.375 Gram(s) IV Intermittent every 8 hours  rivastigmine patch  9.5 mG/24 Hr(s) 1 Patch Transdermal every 24 hours  senna 2 Tablet(s) Oral at bedtime  tamsulosin 0.4 milliGRAM(s) Oral at bedtime    MEDICATIONS  (PRN):  acetaminophen    Suspension .. 650 milliGRAM(s) Oral every 4 hours PRN Temp greater or equal to 38C (100.4F), Mild Pain (1 - 3)  polyethylene glycol 3350 17 Gram(s) Oral at bedtime PRN Constipation          VITALS:   T(C): 36.3 (11-21-19 @ 11:35), Max: 36.4 (11-21-19 @ 04:34)  HR: 59 (11-21-19 @ 16:08) (56 - 60)  BP: 168/78 (11-21-19 @ 16:08) (142/74 - 168/78)  RR: 18 (11-21-19 @ 11:35) (18 - 18)  SpO2: 96% (11-21-19 @ 16:08) (96% - 97%)  Wt(kg): --      PHYSICAL EXAM:  GENERAL: NAD, well nourished and conversant  HEAD:  Atraumatic  EYES: EOM, PERRLA, conjunctiva pink and sclera white  ENT: No tonsillar erythema, exudates, or enlargement, moist mucous membranes, good dentition, no lesions  NECK: Supple, No JVD, normal thyroid, carotids with normal upstrokes and no bruits  CHEST/LUNG: Clear to auscultation bilaterally, No rales, rhonchi, wheezing, or rubs  HEART: Regular rate and rhythm, No murmurs, rubs, or gallops  ABDOMEN: Soft, nondistended, no masses, guarding, tenderness or rebound, bowel sounds present  EXTREMITIES:  2+ Peripheral Pulses, No clubbing, cyanosis, or edema. No arthritis of shoulders, elbows, hands, hips, knees, ankles, or feet. No DJD C spine, T spine, or L/S spine  LYMPH: No lymphadenopathy noted  SKIN: No rashes or lesions  NERVOUS SYSTEM: lethargic and confused    LABS:        CBC Full  -  ( 21 Nov 2019 06:32 )  WBC Count : 8.25 K/uL  RBC Count : 3.50 M/uL  Hemoglobin : 10.2 g/dL  Hematocrit : 32.1 %  Platelet Count - Automated : 190 K/uL  Mean Cell Volume : 91.7 fl  Mean Cell Hemoglobin : 29.1 pg  Mean Cell Hemoglobin Concentration : 31.8 gm/dL  Auto Neutrophil # : x  Auto Lymphocyte # : x  Auto Monocyte # : x  Auto Eosinophil # : x  Auto Basophil # : x  Auto Neutrophil % : x  Auto Lymphocyte % : x  Auto Monocyte % : x  Auto Eosinophil % : x  Auto Basophil % : x    11-21    139  |  103  |  17  ----------------------------<  111<H>  4.1   |  22  |  1.45<H>    Ca    9.2      21 Nov 2019 06:32  Mg     1.9     11-21            CAPILLARY BLOOD GLUCOSE          RADIOLOGY & ADDITIONAL TESTS:

## 2019-11-21 NOTE — EEG REPORT - NS EEG TEXT BOX
[facility]   COMPREHENSIVE EPILEPSY CENTER   REPORT OF ROUTINE VIDEO EEG     [facility]: [address] [place] [address] [address] [place] [address], Ph#: [phone]  [facility]: [address], [place] [address], Ph#: [phone]  [facility]: [address] [place] [address], Ph#: [phone]    Patient Name: CHRISTINE NAJERA  Age and : 89y ([birth year])  MRN #: [id number]  Location: [place] [address]  Referring Physician: Jimmy Franco    Study Date: 19    _____________________________________________________________  TECHNICAL INFORMATION    Placement and Labeling of Electrodes:  The EEG was performed utilizing 20 channels referential EEG connections (coronal over temporal over parasagittal montage) using all standard 10-20 electrode placements with EKG.  Recording was at a sampling rate of 256 samples per second per channel.  Time synchronized digital video recording was done simultaneously with EEG recording.  A low light infrared camera was used for low light recording.  Elan and seizure detection algorithms were utilized.    _____________________________________________________________  HISTORY    Patient is a 89y old  Male who presents with a chief complaint of change in mental status (2019 23:57)      PERTINENT MEDICATION:  none    _____________________________________________________________  STUDY INTERPRETATION    Findings: The background was continuous, spontaneously variable and reactive. During wakefulness, the posterior dominant rhythm consisted of symmetric, well-modulated 7-8 Hz activity, with amplitude to 30 uV, that attenuated to eye opening.     Background Slowing:  Excess diffuse polymorphic delta slowing.    Focal Slowing:   None were present.    Sleep Background:  Drowsiness was characterized by fragmentation, attenuation, and slowing of the background activity.    Stage II sleep transients were not recorded.    Other Non-Epileptiform Findings:  None were present.    Interictal Epileptiform Activity:   None were present.    Events:  Clinical events: None recorded.  Seizures: None recorded.    Activation Procedures:   Hyperventilation was not performed.    Photic stimulation was not performed.     Artifacts:  Intermittent myogenic and movement artifacts were noted.    ECG:  The heart rate on single channel ECG was predominantly between 60-70 BPM.    _____________________________________________________________  EEG SUMMARY/CLASSIFICATION    Abnormal EEG in the awake, drowsy states.  - Mild to moderate generalized slowing.    _____________________________________________________________  EEG IMPRESSION/CLINICAL CORRELATE    Abnormal EEG study.  1. Mild to moderate nonspecific diffuse or multifocal cerebral dysfunction.   2. No epileptiform pattern or seizure seen.    _____________________________________________________________    Bryan Lanier MD  Attending Physician, [facility] [place]

## 2019-11-21 NOTE — PHYSICAL THERAPY INITIAL EVALUATION ADULT - IMPAIRMENTS CONTRIBUTING TO GAIT DEVIATIONS, PT EVAL
impaired sensory feedback/decreased strength/impaired balance/decreased flexibility/impaired postural control

## 2019-11-21 NOTE — PROVIDER CONTACT NOTE (OTHER) - ASSESSMENT
Pt AOx2. At time of notification, patient was resting in bed; appeared asleep. Denied chest discomfort/ dizziness.

## 2019-11-21 NOTE — PHYSICAL THERAPY INITIAL EVALUATION ADULT - STRENGTHENING, PT EVAL
Goal: Pt will increase strength >half a grade  t/o extremities to improve safety of transfers and ambulation in 4 weeks.

## 2019-11-21 NOTE — CONSULT NOTE ADULT - ASSESSMENT
89 yo male recently admitted for hematuria presents with a change in mental status. Patient found to be very lethargic and unable to ambulate. In ED Patient was found to have a + UA with suggested Urinary tract infection     Problem/Plan - 1:  ·  Problem: UTI (urinary tract infection).  Plan: will start Patient on zosyn and await cultures  follow urine output with a hx of BPH  will continue flomax and finesteride.      Problem/Plan - 2:  ·  Problem: AMS (altered mental status).  Plan: continue to monitor MS   start gentle hydration.      Problem/Plan - 3:  ·  Problem: Dementia.  Plan: will continue exelon patch  reorient Patient as needed.       monitor pvr   antibioitcs per med ? id  gu management and fu as outpatient if pvr<300 to 350cc pvr.

## 2019-11-21 NOTE — CONSULT NOTE ADULT - SUBJECTIVE AND OBJECTIVE BOX
HPI  Patient is well known to practice with several recent admissions and rx's for chronic retention, uti' and incomplete bladder emptying now admitted with change of ms.   He is a 89M, pre charting, Hx of CAD s/p PCI x2, TIA on plavix, Atrial Fibrillation on no AC, HLD, HTN, Dementia, multiple UTI presented with son with report of AMS. Per triage note, patient currently being treated with antibiotics for UTI. Patient reportedly more confused from baseline, no longer ambulating, weaker. No other review of systems known at this time, patient unable to provide further hx. Meds, allergies per charting, otherwise unable to obtain from patient. Patient last admitted 9/13-9/20 due to hematuria, UTI. Patient found to have a positive UA in ED. admitted for further treatment    PAST MEDICAL & SURGICAL HISTORY:  Atrial fibrillation  UTI (urinary tract infection): MDR E.coli  Back pain  Fall  Dementia  Hypercholesteremia  HTN - Hypertension  CAD (Coronary Artery Disease)  S/P percutaneous transluminal coronary angioplasty: PCI: stent x2  h/o Hernia Repair      MEDICATIONS  (STANDING):  atorvastatin 10 milliGRAM(s) Oral at bedtime  dextrose 5% + sodium chloride 0.45%. 1000 milliLiter(s) (100 mL/Hr) IV Continuous <Continuous>  finasteride 5 milliGRAM(s) Oral daily  piperacillin/tazobactam IVPB.. 3.375 Gram(s) IV Intermittent every 8 hours  rivastigmine patch  9.5 mG/24 Hr(s) 1 Patch Transdermal every 24 hours  senna 2 Tablet(s) Oral at bedtime  tamsulosin 0.4 milliGRAM(s) Oral at bedtime    MEDICATIONS  (PRN):  acetaminophen    Suspension .. 650 milliGRAM(s) Oral every 4 hours PRN Temp greater or equal to 38C (100.4F), Mild Pain (1 - 3)  polyethylene glycol 3350 17 Gram(s) Oral at bedtime PRN Constipation      FAMILY HISTORY:  No pertinent family history in first degree relatives    Allergies    No Known Allergies    Intolerances    morphine (Unknown)    SOCIAL HISTORY: lives with son no tab/ etoh    REVIEW OF SYSTEMS: patient with oms not able to provide history or ros.     Physical Exam  Vital signs  T(C): 36.3 (11-21-19 @ 11:35), Max: 36.4 (11-20-19 @ 20:07)  HR: 57 (11-21-19 @ 11:35)  BP: 142/74 (11-21-19 @ 11:35)  SpO2: 97% (11-21-19 @ 11:35)  Gen:  WDWN male confused nad  heent ncat neck symm cor reg chest clear  abd soft nd tn no cvat no remi guard  gu no mass circ open no mass on rectal 2+  ext no c/c/e  neuro non focal  psych confused.     LABS:      11-21 @ 06:32    WBC 8.25  / Hct 32.1  / SCr 1.45     11-20 @ 07:19    WBC 6.24  / Hct 30.5  / SCr 1.57     11-21    139  |  103  |  17  ----------------------------<  111<H>  4.1   |  22  |  1.45<H>    Ca    9.2      21 Nov 2019 06:32  Mg     1.9     11-21            Urine Cx:  Blood Cx:    RADIOLOGY:

## 2019-11-21 NOTE — PROGRESS NOTE ADULT - PROBLEM SELECTOR PLAN 2
continue to improve  will need physical therapy   Patient with a metabolic encephalopathy due to his UTI

## 2019-11-21 NOTE — PHYSICAL THERAPY INITIAL EVALUATION ADULT - PERTINENT HX OF CURRENT PROBLEM, REHAB EVAL
89M, pre charting, Hx of CAD s/p PCI x2, TIA on plavix, Atrial Fibrillation on no AC, HLD, HTN, Dementia, multiple UTI presents with son (who left before triage) with report of AMS. Per triage note, patient currently being treated with antibiotics for UTI. Patient last admitted 9/13-9/20 due to hematuria, UTI.

## 2019-11-21 NOTE — PHYSICAL THERAPY INITIAL EVALUATION ADULT - PLANNED THERAPY INTERVENTIONS, PT EVAL
strengthening/bed mobility training/balance training gait training/transfer training/bed mobility training/strengthening/balance training

## 2019-11-22 LAB
AMMONIA BLD-MCNC: 50 UMOL/L — SIGNIFICANT CHANGE UP (ref 11–55)
BASE EXCESS BLDA CALC-SCNC: -0.7 MMOL/L — SIGNIFICANT CHANGE UP (ref -2–2)
CO2 BLDA-SCNC: 24 MMOL/L — SIGNIFICANT CHANGE UP (ref 22–30)
GAS PNL BLDA: SIGNIFICANT CHANGE UP
GLUCOSE BLDC GLUCOMTR-MCNC: 118 MG/DL — HIGH (ref 70–99)
HCO3 BLDA-SCNC: 23 MMOL/L — SIGNIFICANT CHANGE UP (ref 21–29)
PCO2 BLDA: 37 MMHG — SIGNIFICANT CHANGE UP (ref 32–46)
PH BLDA: 7.41 — SIGNIFICANT CHANGE UP (ref 7.35–7.45)
PO2 BLDA: 79 MMHG — SIGNIFICANT CHANGE UP (ref 74–108)
SAO2 % BLDA: 96 % — SIGNIFICANT CHANGE UP (ref 92–96)

## 2019-11-22 PROCEDURE — 71045 X-RAY EXAM CHEST 1 VIEW: CPT | Mod: 26

## 2019-11-22 RX ADMIN — RIVASTIGMINE 1 PATCH: 4.6 PATCH, EXTENDED RELEASE TRANSDERMAL at 12:32

## 2019-11-22 RX ADMIN — TAMSULOSIN HYDROCHLORIDE 0.4 MILLIGRAM(S): 0.4 CAPSULE ORAL at 21:24

## 2019-11-22 RX ADMIN — RIVASTIGMINE 1 PATCH: 4.6 PATCH, EXTENDED RELEASE TRANSDERMAL at 22:56

## 2019-11-22 RX ADMIN — ATORVASTATIN CALCIUM 10 MILLIGRAM(S): 80 TABLET, FILM COATED ORAL at 21:24

## 2019-11-22 RX ADMIN — SODIUM CHLORIDE 100 MILLILITER(S): 9 INJECTION, SOLUTION INTRAVENOUS at 21:24

## 2019-11-22 RX ADMIN — RIVASTIGMINE 1 PATCH: 4.6 PATCH, EXTENDED RELEASE TRANSDERMAL at 23:06

## 2019-11-22 RX ADMIN — RIVASTIGMINE 1 PATCH: 4.6 PATCH, EXTENDED RELEASE TRANSDERMAL at 21:04

## 2019-11-22 RX ADMIN — SODIUM CHLORIDE 100 MILLILITER(S): 9 INJECTION, SOLUTION INTRAVENOUS at 05:09

## 2019-11-22 RX ADMIN — PIPERACILLIN AND TAZOBACTAM 25 GRAM(S): 4; .5 INJECTION, POWDER, LYOPHILIZED, FOR SOLUTION INTRAVENOUS at 21:24

## 2019-11-22 RX ADMIN — PIPERACILLIN AND TAZOBACTAM 25 GRAM(S): 4; .5 INJECTION, POWDER, LYOPHILIZED, FOR SOLUTION INTRAVENOUS at 05:09

## 2019-11-22 RX ADMIN — FINASTERIDE 5 MILLIGRAM(S): 5 TABLET, FILM COATED ORAL at 14:32

## 2019-11-22 RX ADMIN — SENNA PLUS 2 TABLET(S): 8.6 TABLET ORAL at 21:24

## 2019-11-22 RX ADMIN — PIPERACILLIN AND TAZOBACTAM 25 GRAM(S): 4; .5 INJECTION, POWDER, LYOPHILIZED, FOR SOLUTION INTRAVENOUS at 13:20

## 2019-11-22 NOTE — PROGRESS NOTE ADULT - ASSESSMENT
89 yo male recently admitted to [facility] for hematuria presents with a change in mental status. Patient found to be very lethargic and unable to ambulate. In ED Patient was found to have a + UA with suggested Urinary tract infection. Patient continues to improve with treatment but still lethargiv eih rehaf. Will need physical therapy with eventual placement in [facility].  W/u in  progress fo  persistent  lethargy.

## 2019-11-22 NOTE — PROGRESS NOTE ADULT - SUBJECTIVE AND OBJECTIVE BOX
Patient is a 89y old  Male who presents with a chief complaint of change in mental status  Hx of CAD s/p PCI x2, TIA on plavix, Atrial Fibrillation on no AC, HLD, HTN, Dementia, multiple UTI presents with son (who left before triage) with report of AMS. Per triage note, patient currently being treated with antibiotics for UTI. Patient reportedly more confused from baseline, no longer ambulating, weaker. No other review of systems known at this time, patient unable to provide further hx. Meds, allergies per charting, otherwise unable to obtain from patient. Patient last admitted 9/13-9/20 due to hematuria, UTI. Patient found to have a positive UA in ED.   Patient continue to improve with abx.  Patient today more lethargic again c/w yesterday  Daughter at bedside  Sleepy but arousable  then goes right back to sleep       MEDICATIONS  (STANDING):  atorvastatin 10 milliGRAM(s) Oral at bedtime  dextrose 5% + sodium chloride 0.45%. 1000 milliLiter(s) (100 mL/Hr) IV Continuous <Continuous>  finasteride 5 milliGRAM(s) Oral daily  piperacillin/tazobactam IVPB.. 3.375 Gram(s) IV Intermittent every 8 hours  rivastigmine patch  9.5 mG/24 Hr(s) 1 Patch Transdermal every 24 hours  senna 2 Tablet(s) Oral at bedtime  tamsulosin 0.4 milliGRAM(s) Oral at bedtime    MEDICATIONS  (PRN):  acetaminophen    Suspension .. 650 milliGRAM(s) Oral every 4 hours PRN Temp greater or equal to 38C (100.4F), Mild Pain (1 - 3)  polyethylene glycol 3350 17 Gram(s) Oral at bedtime PRN Constipation          Vital Signs Last 24 Hrs  T(C): 36.4 (22 Nov 2019 11:44), Max: 36.4 (22 Nov 2019 11:44)  T(F): 97.6 (22 Nov 2019 11:44), Max: 97.6 (22 Nov 2019 11:44)  HR: 55 (22 Nov 2019 11:44) (55 - 58)  BP: 153/80 (22 Nov 2019 11:44) (153/80 - 165/78)  BP(mean): --  RR: 18 (22 Nov 2019 11:44) (18 - 18)  SpO2: 96% (22 Nov 2019 11:44) (95% - 96%)      PHYSICAL EXAM:  GENERAL: NAD, well nourished and conversant  HEAD:  Atraumatic  EYES: EOM, PERRLA, conjunctiva pink and sclera white  ENT: No tonsillar erythema, exudates, or enlargement, moist mucous membranes, good dentition, no lesions  NECK: Supple, No JVD, normal thyroid, carotids with normal upstrokes and no bruits  CHEST/LUNG: Clear to auscultation bilaterally, No rales, rhonchi, wheezing, or rubs  HEART: Regular rate and rhythm, No murmurs, rubs, or gallops  ABDOMEN: Soft, nondistended, no masses, guarding, tenderness or rebound, bowel sounds present  EXTREMITIES:  2+ Peripheral Pulses, No clubbing, cyanosis, or edema. No arthritis of shoulders, elbows, hands, hips, knees, ankles, or feet. No DJD C spine, T spine, or L/S spine  LYMPH: No lymphadenopathy noted  SKIN: No rashes or lesions  NERVOUS SYSTEM: lethargic and confused    LABS:      ABG - ( 22 Nov 2019 14:12 )  pH, Arterial: 7.41  pH, Blood: x     /  pCO2: 37    /  pO2: 79    / HCO3: 23    / Base Excess: -.7   /  SaO2: 96                    CBC Full  -  ( 21 Nov 2019 06:32 )  WBC Count : 8.25 K/uL  RBC Count : 3.50 M/uL  Hemoglobin : 10.2 g/dL  Hematocrit : 32.1 %  Platelet Count - Automated : 190 K/uL  Mean Cell Volume : 91.7 fl  Mean Cell Hemoglobin : 29.1 pg  Mean Cell Hemoglobin Concentration : 31.8 gm/dL  Auto Neutrophil # : x  Auto Lymphocyte # : x  Auto Monocyte # : x  Auto Eosinophil # : x  Auto Basophil # : x  Auto Neutrophil % : x  Auto Lymphocyte % : x  Auto Monocyte % : x  Auto Eosinophil % : x  Auto Basophil % : x    11-21    139  |  103  |  17  ----------------------------<  111<H>  4.1   |  22  |  1.45<H>    Ca    9.2      21 Nov 2019 06:32  Mg     1.9     11-21            CBC Full  -  ( 21 Nov 2019 06:32 )  WBC Count : 8.25 K/uL  RBC Count : 3.50 M/uL  Hemoglobin : 10.2 g/dL  Hematocrit : 32.1 %  Platelet Count - Automated : 190 K/uL  Mean Cell Volume : 91.7 fl  Mean Cell Hemoglobin : 29.1 pg  Mean Cell Hemoglobin Concentration : 31.8 gm/dL  Auto Neutrophil # : x  Auto Lymphocyte # : x  Auto Monocyte # : x  Auto Eosinophil # : x  Auto Basophil # : x  Auto Neutrophil % : x  Auto Lymphocyte % : x  Auto Monocyte % : x  Auto Eosinophil % : x  Auto Basophil % : x    11-21    139  |  103  |  17  ----------------------------<  111<H>  4.1   |  22  |  1.45<H>    Ca    9.2      21 Nov 2019 06:32  Mg     1.9     11-21            CAPILLARY BLOOD GLUCOSE          RADIOLOGY & ADDITIONAL TESTS:

## 2019-11-22 NOTE — PROGRESS NOTE ADULT - PROBLEM SELECTOR PLAN 2
continue to improve in a staccato pattern -- more lethargic today ABG and DT are unremarkable  Check ammonia level and lfts    will need physical therapy   Patient with a metabolic encephalopathy due to his UTI

## 2019-11-23 LAB
ANION GAP SERPL CALC-SCNC: 13 MMOL/L — SIGNIFICANT CHANGE UP (ref 5–17)
BUN SERPL-MCNC: 14 MG/DL — SIGNIFICANT CHANGE UP (ref 7–23)
CALCIUM SERPL-MCNC: 8.8 MG/DL — SIGNIFICANT CHANGE UP (ref 8.4–10.5)
CHLORIDE SERPL-SCNC: 107 MMOL/L — SIGNIFICANT CHANGE UP (ref 96–108)
CO2 SERPL-SCNC: 21 MMOL/L — LOW (ref 22–31)
CREAT SERPL-MCNC: 1.47 MG/DL — HIGH (ref 0.5–1.3)
CULTURE RESULTS: SIGNIFICANT CHANGE UP
CULTURE RESULTS: SIGNIFICANT CHANGE UP
GLUCOSE SERPL-MCNC: 103 MG/DL — HIGH (ref 70–99)
HCT VFR BLD CALC: 31 % — LOW (ref 39–50)
HGB BLD-MCNC: 10 G/DL — LOW (ref 13–17)
MAGNESIUM SERPL-MCNC: 1.8 MG/DL — SIGNIFICANT CHANGE UP (ref 1.6–2.6)
MCHC RBC-ENTMCNC: 29.7 PG — SIGNIFICANT CHANGE UP (ref 27–34)
MCHC RBC-ENTMCNC: 32.3 GM/DL — SIGNIFICANT CHANGE UP (ref 32–36)
MCV RBC AUTO: 92 FL — SIGNIFICANT CHANGE UP (ref 80–100)
NRBC # BLD: 0 /100 WBCS — SIGNIFICANT CHANGE UP (ref 0–0)
PHOSPHATE SERPL-MCNC: 2.8 MG/DL — SIGNIFICANT CHANGE UP (ref 2.5–4.5)
PLATELET # BLD AUTO: 164 K/UL — SIGNIFICANT CHANGE UP (ref 150–400)
POTASSIUM SERPL-MCNC: 4.1 MMOL/L — SIGNIFICANT CHANGE UP (ref 3.5–5.3)
POTASSIUM SERPL-SCNC: 4.1 MMOL/L — SIGNIFICANT CHANGE UP (ref 3.5–5.3)
RBC # BLD: 3.37 M/UL — LOW (ref 4.2–5.8)
RBC # FLD: 14.8 % — HIGH (ref 10.3–14.5)
SODIUM SERPL-SCNC: 141 MMOL/L — SIGNIFICANT CHANGE UP (ref 135–145)
SPECIMEN SOURCE: SIGNIFICANT CHANGE UP
SPECIMEN SOURCE: SIGNIFICANT CHANGE UP
WBC # BLD: 6.33 K/UL — SIGNIFICANT CHANGE UP (ref 3.8–10.5)
WBC # FLD AUTO: 6.33 K/UL — SIGNIFICANT CHANGE UP (ref 3.8–10.5)

## 2019-11-23 RX ORDER — AMLODIPINE BESYLATE 2.5 MG/1
10 TABLET ORAL DAILY
Refills: 0 | Status: DISCONTINUED | OUTPATIENT
Start: 2019-11-23 | End: 2019-12-04

## 2019-11-23 RX ADMIN — RIVASTIGMINE 1 PATCH: 4.6 PATCH, EXTENDED RELEASE TRANSDERMAL at 08:57

## 2019-11-23 RX ADMIN — ATORVASTATIN CALCIUM 10 MILLIGRAM(S): 80 TABLET, FILM COATED ORAL at 22:15

## 2019-11-23 RX ADMIN — SODIUM CHLORIDE 100 MILLILITER(S): 9 INJECTION, SOLUTION INTRAVENOUS at 05:01

## 2019-11-23 RX ADMIN — SENNA PLUS 2 TABLET(S): 8.6 TABLET ORAL at 22:16

## 2019-11-23 RX ADMIN — AMLODIPINE BESYLATE 10 MILLIGRAM(S): 2.5 TABLET ORAL at 22:15

## 2019-11-23 RX ADMIN — FINASTERIDE 5 MILLIGRAM(S): 5 TABLET, FILM COATED ORAL at 13:37

## 2019-11-23 RX ADMIN — RIVASTIGMINE 1 PATCH: 4.6 PATCH, EXTENDED RELEASE TRANSDERMAL at 22:28

## 2019-11-23 RX ADMIN — PIPERACILLIN AND TAZOBACTAM 25 GRAM(S): 4; .5 INJECTION, POWDER, LYOPHILIZED, FOR SOLUTION INTRAVENOUS at 22:23

## 2019-11-23 RX ADMIN — SODIUM CHLORIDE 100 MILLILITER(S): 9 INJECTION, SOLUTION INTRAVENOUS at 22:14

## 2019-11-23 RX ADMIN — PIPERACILLIN AND TAZOBACTAM 25 GRAM(S): 4; .5 INJECTION, POWDER, LYOPHILIZED, FOR SOLUTION INTRAVENOUS at 13:40

## 2019-11-23 RX ADMIN — PIPERACILLIN AND TAZOBACTAM 25 GRAM(S): 4; .5 INJECTION, POWDER, LYOPHILIZED, FOR SOLUTION INTRAVENOUS at 05:00

## 2019-11-23 RX ADMIN — TAMSULOSIN HYDROCHLORIDE 0.4 MILLIGRAM(S): 0.4 CAPSULE ORAL at 22:15

## 2019-11-23 NOTE — PROGRESS NOTE ADULT - ASSESSMENT
89 yo male recently admitted to [facility] for hematuria presents with a change in mental status. Patient found to be very lethargic and unable to ambulate. In ED Patient was found to have a + UA with suggested Urinary tract infection. Patient continues to improve with treatment but still lethargiv eih rehaf. Will need physical therapy with eventual placement in [facility].  W/u in  progress for  persistent  lethargy.

## 2019-11-23 NOTE — PROGRESS NOTE ADULT - SUBJECTIVE AND OBJECTIVE BOX
Patient is a 89y old  Male who presents with a chief complaint of change in mental status  Hx of CAD s/p PCI x2, TIA on plavix, Atrial Fibrillation on no AC, HLD, HTN, Dementia, multiple UTI presents with son (who left before triage) with report of AMS. Per triage note, patient currently being treated with antibiotics for UTI. Patient reportedly more confused from baseline, no longer ambulating, weaker. No other review of systems known at this time, patient unable to provide further hx. Meds, allergies per charting, otherwise unable to obtain from patient. Patient last admitted 9/13-9/20 due to hematuria, UTI. Patient found to have a positive UA in ED.   Patient continue to improve with abx.  Patient today more lethargic again c/w yesterday  Daughter at bedside  Sleepy but arousable  then goes right back to sleep.  Patient starting to get stronger,  his BP elevated  - will start patient on amlodipine 10 mg po qd  for BP control.      MEDICATIONS  (STANDING):  atorvastatin 10 milliGRAM(s) Oral at bedtime  dextrose 5% + sodium chloride 0.45%. 1000 milliLiter(s) (100 mL/Hr) IV Continuous <Continuous>  finasteride 5 milliGRAM(s) Oral daily  piperacillin/tazobactam IVPB.. 3.375 Gram(s) IV Intermittent every 8 hours  rivastigmine patch  9.5 mG/24 Hr(s) 1 Patch Transdermal every 24 hours  senna 2 Tablet(s) Oral at bedtime  tamsulosin 0.4 milliGRAM(s) Oral at bedtime    MEDICATIONS  (PRN):  acetaminophen    Suspension .. 650 milliGRAM(s) Oral every 4 hours PRN Temp greater or equal to 38C (100.4F), Mild Pain (1 - 3)  polyethylene glycol 3350 17 Gram(s) Oral at bedtime PRN Constipation        Vital Signs Last 24 Hrs  T(C): 36.7   T(F): 98  HR: 61   BP: 166/88  BP(mean): --  RR: 18   SpO2: 98%      PHYSICAL EXAM:  GENERAL: NAD, well nourished and conversant  HEAD:  Atraumatic  EYES: EOM, PERRLA, conjunctiva pink and sclera white  ENT: No tonsillar erythema, exudates, or enlargement, moist mucous membranes, good dentition, no lesions  NECK: Supple, No JVD, normal thyroid, carotids with normal upstrokes and no bruits  CHEST/LUNG: Clear to auscultation bilaterally, No rales, rhonchi, wheezing, or rubs  HEART: Regular rate and rhythm, No murmurs, rubs, or gallops  ABDOMEN: Soft, nondistended, no masses, guarding, tenderness or rebound, bowel sounds present  EXTREMITIES:  2+ Peripheral Pulses, No clubbing, cyanosis, or edema. No arthritis of shoulders, elbows, hands, hips, knees, ankles, or feet. No DJD C spine, T spine, or L/S spine  LYMPH: No lymphadenopathy noted  SKIN: No rashes or lesions  NERVOUS SYSTEM: lethargic and confused    LABS:  ABG - ( 22 Nov 2019 14:12 )  pH, Arterial: 7.41  pH, Blood: x     /  pCO2: 37    /  pO2: 79    / HCO3: 23    / Base Excess: -.7   /  SaO2: 96                    CBC Full  -  ( 23 Nov 2019 06:27 )  WBC Count : 6.33 K/uL  RBC Count : 3.37 M/uL  Hemoglobin : 10.0 g/dL  Hematocrit : 31.0 %  Platelet Count - Automated : 164 K/uL  Mean Cell Volume : 92.0 fl  Mean Cell Hemoglobin : 29.7 pg  Mean Cell Hemoglobin Concentration : 32.3 gm/dL  Auto Neutrophil # : x  Auto Lymphocyte # : x  Auto Monocyte # : x  Auto Eosinophil # : x  Auto Basophil # : x  Auto Neutrophil % : x  Auto Lymphocyte % : x  Auto Monocyte % : x  Auto Eosinophil % : x  Auto Basophil % : x    11-23    141  |  107  |  14  ----------------------------<  103<H>  4.1   |  21<L>  |  1.47<H>    Ca    8.8      23 Nov 2019 06:27  Phos  2.8     11-23  Mg     1.8     11-23              ABG - ( 22 Nov 2019 14:12 )  pH, Arterial: 7.41  pH, Blood: x     /  pCO2: 37    /  pO2: 79    / HCO3: 23    / Base Excess: -.7   /  SaO2: 96                    CBC Full  -  ( 21 Nov 2019 06:32 )  WBC Count : 8.25 K/uL  RBC Count : 3.50 M/uL  Hemoglobin : 10.2 g/dL  Hematocrit : 32.1 %  Platelet Count - Automated : 190 K/uL  Mean Cell Volume : 91.7 fl  Mean Cell Hemoglobin : 29.1 pg  Mean Cell Hemoglobin Concentration : 31.8 gm/dL  Auto Neutrophil # : x  Auto Lymphocyte # : x  Auto Monocyte # : x  Auto Eosinophil # : x  Auto Basophil # : x  Auto Neutrophil % : x  Auto Lymphocyte % : x  Auto Monocyte % : x  Auto Eosinophil % : x  Auto Basophil % : x    11-21    139  |  103  |  17  ----------------------------<  111<H>  4.1   |  22  |  1.45<H>    Ca    9.2      21 Nov 2019 06:32  Mg     1.9     11-21            CBC Full  -  ( 21 Nov 2019 06:32 )  WBC Count : 8.25 K/uL  RBC Count : 3.50 M/uL  Hemoglobin : 10.2 g/dL  Hematocrit : 32.1 %  Platelet Count - Automated : 190 K/uL  Mean Cell Volume : 91.7 fl  Mean Cell Hemoglobin : 29.1 pg  Mean Cell Hemoglobin Concentration : 31.8 gm/dL  Auto Neutrophil # : x  Auto Lymphocyte # : x  Auto Monocyte # : x  Auto Eosinophil # : x  Auto Basophil # : x  Auto Neutrophil % : x  Auto Lymphocyte % : x  Auto Monocyte % : x  Auto Eosinophil % : x  Auto Basophil % : x    11-21    139  |  103  |  17  ----------------------------<  111<H>  4.1   |  22  |  1.45<H>    Ca    9.2      21 Nov 2019 06:32  Mg     1.9     11-21            CAPILLARY BLOOD GLUCOSE          RADIOLOGY & ADDITIONAL TESTS:

## 2019-11-23 NOTE — CHART NOTE - NSCHARTNOTEFT_GEN_A_CORE
Chart/Event Note  [facility] 3DSU 347 D1  CHRISTINE NAJERA, 89y, Male  [id number]    Reason for Notification:   Notified by RN that patient was hypertensive with a BP of 194/86. BP checked with multiple devices as well as manual, all readings consistent.     Events/History of Present Illness  Patient's chart reviewed. Patient with known history of hypertension, currently off BP med    Review of Systems:  Constitutional: No fever, chills, or fatigue.  Neurologic: No headache, dizziness, vision/speech changes, numbness.  Respiratory: No cough, wheezing, dyspnea, or shortness of breath.  Cardiovascular: No chest pain, pressure, or palpitations.   Musculoskeletal: No joint pain or swelling.     T(C): 36.7 (11-23-19 @ 21:00), Max: 36.7 (11-23-19 @ 04:19)  HR: 66 (11-23-19 @ 21:00) (58 - 66)  BP: 194/86 (11-23-19 @ 21:38) (136/77 - 194/86)  RR: 18 (11-23-19 @ 21:00) (18 - 18)  SpO2: 98% (11-23-19 @ 21:00) (98% - 98%)             Physical Examination:  GENERAL: No acute distress noted during examination. .  NERVOUS SYSTEM:  Alert & oriented X1 with appropriate concentration. No focal or lateralizing neurologic deficits noted.   HEAD:  Atraumatic and normocephalic.  NECK: Supple with no JVD.   CHEST: Clear to ascultation bilaterally. No rales, rhonchi, wheezing, or rubs heard. Symmetrical/bilateral chest wall rise.   HEART: Regular rate and rhythm. Hypertension   ABDOMEN: Soft, nontender, and nondistended.   EXTREMITIES:  2+ Peripheral Pulses without clubbing, cyanosis, or edema.    Medical Decision Making/Assessment/Plan:      - Diagnosis:      1)   2)   3)   3) Will endorse the above diagnostics and findings to the day medicine team for review and follow up. Will additionally sign out to day medicine teams to follow with relevant specialties.     Afshin Gomez NP  Department of Medicine   # Chart/Event Note  [facility] 3DSU 347 D1  CHRISTINE NAJERA, 89y, Male  [id number]    Reason for Notification:   Notified by RN that patient was hypertensive with a BP of 194/86. BP checked with multiple devices as well as manual, all readings consistent.     Events/History of Present Illness  Patient's chart reviewed. Patient with known history of hypertension, currently off BP med due to initial hypotension during his inpatient stay. Medication reconciliation reviewed, no active prescriptions for BP medications noted, last prescribed medication for BP was Lopressor (08/2019). Patient currently asymptomatic, states he felt worried when the RN told him his BP was high, but denies any active complaints. Patient appears improved from his baseline status, less lethargic than he has been on previous assessment. Patient reports that he knows that he has high blood pressure, but is unsure any details related to this diagnosis, his medication regimen, or previous episodes of hypertension.     Review of Systems:  Constitutional: No fever, chills, or fatigue.  Neurologic: No headache, dizziness, vision/speech changes, numbness.  Respiratory: No cough, wheezing, dyspnea, or shortness of breath.  Cardiovascular: No chest pain, pressure, or palpitations.   Musculoskeletal: No joint pain or swelling.     T(C): 36.7 (11-23-19 @ 21:00), Max: 36.7 (11-23-19 @ 04:19)  HR: 66 (11-23-19 @ 21:00) (58 - 66)  BP: 194/86 (11-23-19 @ 21:38) (136/77 - 194/86)  RR: 18 (11-23-19 @ 21:00) (18 - 18)  SpO2: 98% (11-23-19 @ 21:00) (98% - 98%)             Physical Examination:  GENERAL: No acute distress noted during examination. .  NERVOUS SYSTEM:  Alert & oriented X1 with appropriate concentration. No focal or lateralizing neurologic deficits noted.   HEAD:  Atraumatic and normocephalic.  NECK: Supple with no JVD.   CHEST: Clear to ascultation bilaterally. No rales, rhonchi, wheezing, or rubs heard. Symmetrical/bilateral chest wall rise.   HEART: Regular rate and rhythm. Hypertension   ABDOMEN: Soft, nontender, and nondistended.   EXTREMITIES:  2+ Peripheral Pulses without clubbing, cyanosis, or edema.    Medical Decision Making/Assessment/Plan:  89-year-old Male with past medical history of AFib, hypertension, hyperlipidemia, and dementia presented, admitted with lethargy and UIT, clinical status improving but now with new hypertensive urgency.     - Diagnosis: Hypertension without End Organ Damage (Hypertensive Urgency)  New hypertensive urgency. Review of chart shows that patient was initially hypotensive and with active infection, but over past 3 days BP has increased with average systolic readings between 140-160. Likely exacerbation of patient's chronic hypertension now that infection is improving and BP medications have been held. As patient is symptomatic, will resume patient's oral antihypertensive medication and monitor for resolution.       1) Unsure patient's exact home regimen, discussed with Dr. Franco, will start with Amlodipine 10 mg daily.   2) Continue with Q4 vitals as per unit policy.   3) Will monitor for resolution/improvement.   4) Above discussed with internal medicine attending physician Dr. Franco who agrees with outlined plan of care.   5) Will endorse the above diagnostics and findings to the day medicine team for review and follow up. Will additionally sign out to day medicine teams to follow with relevant specialties.     Afshin Gomez NP  Department of Medicine   #[id number] Chart/Event Note  [facility] 3DSU 347 D1  CHRISTINE NAJERA, 89y, Male  [id number]    Reason for Notification:   Notified by RN that patient was hypertensive with a BP of 194/86. BP checked with multiple devices as well as manual, all readings consistent.     Events/History of Present Illness  Patient's chart reviewed. Patient with known history of hypertension, currently off BP med due to initial hypotension during his inpatient stay. Medication reconciliation reviewed, no active prescriptions for BP medications noted, last prescribed medication for BP was Lopressor (08/2019). Patient currently asymptomatic, states he felt worried when the RN told him his BP was high, but denies any active complaints. Patient appears improved from his baseline status, less lethargic than he has been on previous assessment. Patient reports that he knows that he has high blood pressure, but is unsure any details related to this diagnosis, his medication regimen, or previous episodes of hypertension.     Review of Systems:  Constitutional: No fever, chills, or fatigue.  Neurologic: No headache, dizziness, vision/speech changes, numbness.  Respiratory: No cough, wheezing, dyspnea, or shortness of breath.  Cardiovascular: No chest pain, pressure, or palpitations.   Musculoskeletal: No joint pain or swelling.     T(C): 36.7 (11-23-19 @ 21:00), Max: 36.7 (11-23-19 @ 04:19)  HR: 66 (11-23-19 @ 21:00) (58 - 66)  BP: 194/86 (11-23-19 @ 21:38) (136/77 - 194/86)  RR: 18 (11-23-19 @ 21:00) (18 - 18)  SpO2: 98% (11-23-19 @ 21:00) (98% - 98%)             Physical Examination:  GENERAL: No acute distress noted during examination. .  NERVOUS SYSTEM:  Alert & oriented X1 with appropriate concentration. No focal or lateralizing neurologic deficits noted.   HEAD:  Atraumatic and normocephalic.  NECK: Supple with no JVD.   CHEST: Clear to ascultation bilaterally. No rales, rhonchi, wheezing, or rubs heard. Symmetrical/bilateral chest wall rise.   HEART: Regular rate and rhythm. Hypertension   ABDOMEN: Soft, nontender, and nondistended.   EXTREMITIES:  2+ Peripheral Pulses without clubbing, cyanosis, or edema.    Medical Decision Making/Assessment/Plan:  89-year-old Male with past medical history of AFib, hypertension, hyperlipidemia, and dementia presented, admitted with lethargy and UIT, clinical status improving but now with new hypertensive urgency.     - Diagnosis: Hypertension without End Organ Damage (Hypertensive Urgency)  New hypertensive urgency. Review of chart shows that patient was initially hypotensive and with active infection, but over past 3 days BP has increased with average systolic readings between 140-160. Likely exacerbation of patient's chronic hypertension now that infection is improving and BP medications have been held. As patient is symptomatic, will resume patient's oral antihypertensive medication and monitor for resolution.       1) Unsure patient's exact home regimen, discussed with Dr. Franco, will start with Amlodipine 10 mg daily.   2) Continue with Q4 vitals as per unit policy.   3) Will monitor for resolution/improvement.   4) Above discussed with internal medicine attending physician Dr. Franco who agrees with outlined plan of care.   5) Will endorse the above diagnostics and findings to the day medicine team for review and follow up. Will additionally sign out to day medicine teams to follow with relevant specialties.     Afshin Gomez NP  Department of Medicine   #[id number]    Addendum 0300: BP improved to 166/88. Patient remains asymptomatic. Will continue with Amlodipine 10 mg Q day.

## 2019-11-24 LAB
ANION GAP SERPL CALC-SCNC: 14 MMOL/L — SIGNIFICANT CHANGE UP (ref 5–17)
BUN SERPL-MCNC: 16 MG/DL — SIGNIFICANT CHANGE UP (ref 7–23)
CALCIUM SERPL-MCNC: 8.8 MG/DL — SIGNIFICANT CHANGE UP (ref 8.4–10.5)
CHLORIDE SERPL-SCNC: 105 MMOL/L — SIGNIFICANT CHANGE UP (ref 96–108)
CO2 SERPL-SCNC: 22 MMOL/L — SIGNIFICANT CHANGE UP (ref 22–31)
CREAT SERPL-MCNC: 1.44 MG/DL — HIGH (ref 0.5–1.3)
GLUCOSE SERPL-MCNC: 110 MG/DL — HIGH (ref 70–99)
HCT VFR BLD CALC: 33.5 % — LOW (ref 39–50)
HGB BLD-MCNC: 10.5 G/DL — LOW (ref 13–17)
MAGNESIUM SERPL-MCNC: 1.8 MG/DL — SIGNIFICANT CHANGE UP (ref 1.6–2.6)
MCHC RBC-ENTMCNC: 29 PG — SIGNIFICANT CHANGE UP (ref 27–34)
MCHC RBC-ENTMCNC: 31.3 GM/DL — LOW (ref 32–36)
MCV RBC AUTO: 92.5 FL — SIGNIFICANT CHANGE UP (ref 80–100)
NRBC # BLD: 0 /100 WBCS — SIGNIFICANT CHANGE UP (ref 0–0)
PHOSPHATE SERPL-MCNC: 2.4 MG/DL — LOW (ref 2.5–4.5)
PLATELET # BLD AUTO: 177 K/UL — SIGNIFICANT CHANGE UP (ref 150–400)
POTASSIUM SERPL-MCNC: 4.1 MMOL/L — SIGNIFICANT CHANGE UP (ref 3.5–5.3)
POTASSIUM SERPL-SCNC: 4.1 MMOL/L — SIGNIFICANT CHANGE UP (ref 3.5–5.3)
RBC # BLD: 3.62 M/UL — LOW (ref 4.2–5.8)
RBC # FLD: 14.7 % — HIGH (ref 10.3–14.5)
SODIUM SERPL-SCNC: 141 MMOL/L — SIGNIFICANT CHANGE UP (ref 135–145)
WBC # BLD: 7.88 K/UL — SIGNIFICANT CHANGE UP (ref 3.8–10.5)
WBC # FLD AUTO: 7.88 K/UL — SIGNIFICANT CHANGE UP (ref 3.8–10.5)

## 2019-11-24 RX ORDER — RIVASTIGMINE 4.6 MG/24H
2 PATCH, EXTENDED RELEASE TRANSDERMAL ONCE
Refills: 0 | Status: COMPLETED | OUTPATIENT
Start: 2019-11-24 | End: 2019-11-24

## 2019-11-24 RX ADMIN — RIVASTIGMINE 2 PATCH: 4.6 PATCH, EXTENDED RELEASE TRANSDERMAL at 08:38

## 2019-11-24 RX ADMIN — SODIUM CHLORIDE 100 MILLILITER(S): 9 INJECTION, SOLUTION INTRAVENOUS at 20:10

## 2019-11-24 RX ADMIN — RIVASTIGMINE 2 PATCH: 4.6 PATCH, EXTENDED RELEASE TRANSDERMAL at 19:00

## 2019-11-24 RX ADMIN — PIPERACILLIN AND TAZOBACTAM 25 GRAM(S): 4; .5 INJECTION, POWDER, LYOPHILIZED, FOR SOLUTION INTRAVENOUS at 13:19

## 2019-11-24 RX ADMIN — SENNA PLUS 2 TABLET(S): 8.6 TABLET ORAL at 22:05

## 2019-11-24 RX ADMIN — AMLODIPINE BESYLATE 10 MILLIGRAM(S): 2.5 TABLET ORAL at 13:19

## 2019-11-24 RX ADMIN — ATORVASTATIN CALCIUM 10 MILLIGRAM(S): 80 TABLET, FILM COATED ORAL at 22:05

## 2019-11-24 RX ADMIN — PIPERACILLIN AND TAZOBACTAM 25 GRAM(S): 4; .5 INJECTION, POWDER, LYOPHILIZED, FOR SOLUTION INTRAVENOUS at 22:05

## 2019-11-24 RX ADMIN — PIPERACILLIN AND TAZOBACTAM 25 GRAM(S): 4; .5 INJECTION, POWDER, LYOPHILIZED, FOR SOLUTION INTRAVENOUS at 05:23

## 2019-11-24 RX ADMIN — RIVASTIGMINE 1 PATCH: 4.6 PATCH, EXTENDED RELEASE TRANSDERMAL at 01:31

## 2019-11-24 RX ADMIN — TAMSULOSIN HYDROCHLORIDE 0.4 MILLIGRAM(S): 0.4 CAPSULE ORAL at 22:05

## 2019-11-24 RX ADMIN — FINASTERIDE 5 MILLIGRAM(S): 5 TABLET, FILM COATED ORAL at 13:19

## 2019-11-24 RX ADMIN — RIVASTIGMINE 2 PATCH: 4.6 PATCH, EXTENDED RELEASE TRANSDERMAL at 02:54

## 2019-11-24 NOTE — PROGRESS NOTE ADULT - ASSESSMENT
89 yo male recently admitted to [facility] for hematuria presents with a change in mental status. Patient found to be very lethargic and unable to ambulate. In ED Patient was found to have a + UA with suggested Urinary tract infection. Patient continues to improve with treatment but still lethargiv eih rehaf. Will need physical therapy with eventual placement in [facility].  W/u in  progress for  persistent  lethargy.

## 2019-11-24 NOTE — PROGRESS NOTE ADULT - SUBJECTIVE AND OBJECTIVE BOX
Patient is a 89y old  Male who presents with a chief complaint of change in mental status  Hx of CAD s/p PCI x2, TIA on plavix, Atrial Fibrillation on no AC, HLD, HTN, Dementia, multiple UTI presents with son (who left before triage) with report of AMS. Per triage note, patient currently being treated with antibiotics for UTI. Patient reportedly more confused from baseline, no longer ambulating, weaker. No other review of systems known at this time, patient unable to provide further hx. Meds, allergies per charting, otherwise unable to obtain from patient. Patient last admitted 9/13-9/20 due to hematuria, UTI. Patient found to have a positive UA in ED.   Patient continue to improve with abx.  Patient today more lethargic again c/w yesterday  Daughter at bedside  Sleepy but arousable  then goes right back to sleep.  Patient starting to get stronger,  his BP elevated  - will start patient on amlodipine 10 mg po qd  for BP control.  Patine loodk even better than yesterday  very talkative needs to increase physical activity and PT.      MEDICATIONS  (STANDING):  atorvastatin 10 milliGRAM(s) Oral at bedtime  dextrose 5% + sodium chloride 0.45%. 1000 milliLiter(s) (100 mL/Hr) IV Continuous <Continuous>  finasteride 5 milliGRAM(s) Oral daily  piperacillin/tazobactam IVPB.. 3.375 Gram(s) IV Intermittent every 8 hours  rivastigmine patch  9.5 mG/24 Hr(s) 1 Patch Transdermal every 24 hours  senna 2 Tablet(s) Oral at bedtime  tamsulosin 0.4 milliGRAM(s) Oral at bedtime    MEDICATIONS  (PRN):  acetaminophen    Suspension .. 650 milliGRAM(s) Oral every 4 hours PRN Temp greater or equal to 38C (100.4F), Mild Pain (1 - 3)  polyethylene glycol 3350 17 Gram(s) Oral at bedtime PRN Constipation        VSS      PHYSICAL EXAM:  GENERAL: NAD, well nourished and conversant  HEAD:  Atraumatic  EYES: EOM, PERRLA, conjunctiva pink and sclera white  ENT: No tonsillar erythema, exudates, or enlargement, moist mucous membranes, good dentition, no lesions  NECK: Supple, No JVD, normal thyroid, carotids with normal upstrokes and no bruits  CHEST/LUNG: Clear to auscultation bilaterally, No rales, rhonchi, wheezing, or rubs  HEART: Regular rate and rhythm, No murmurs, rubs, or gallops  ABDOMEN: Soft, nondistended, no masses, guarding, tenderness or rebound, bowel sounds present  EXTREMITIES:  2+ Peripheral Pulses, No clubbing, cyanosis, or edema. No arthritis of shoulders, elbows, hands, hips, knees, ankles, or feet. No DJD C spine, T spine, or L/S spine  LYMPH: No lymphadenopathy noted  SKIN: No rashes or lesions  NERVOUS SYSTEM: lethargic and confused    LABS:  ABG - ( 22 Nov 2019 14:12 )  pH, Arterial: 7.41  pH, Blood: x     /  pCO2: 37    /  pO2: 79    / HCO3: 23    / Base Excess: -.7   /  SaO2: 96        CBC Full  -  ( 23 Nov 2019 06:27 )  WBC Count : 6.33 K/uL  RBC Count : 3.37 M/uL  Hemoglobin : 10.0 g/dL  Hematocrit : 31.0 %  Platelet Count - Automated : 164 K/uL  Mean Cell Volume : 92.0 fl  Mean Cell Hemoglobin : 29.7 pg  Mean Cell Hemoglobin Concentration : 32.3 gm/dL  Auto Neutrophil # : x  Auto Lymphocyte # : x  Auto Monocyte # : x  Auto Eosinophil # : x  Auto Basophil # : x  Auto Neutrophil % : x  Auto Lymphocyte % : x  Auto Monocyte % : x  Auto Eosinophil % : x  Auto Basophil % : x    11-23    141  |  107  |  14  ----------------------------<  103<H>  4.1   |  21<L>  |  1.47<H>    Ca    8.8      23 Nov 2019 06:27  Phos  2.8     11-23  Mg     1.8     11-23              ABG - ( 22 Nov 2019 14:12 )  pH, Arterial: 7.41  pH, Blood: x     /  pCO2: 37    /  pO2: 79    / HCO3: 23    / Base Excess: -.7   /  SaO2: 96                    CBC Full  -  ( 21 Nov 2019 06:32 )  WBC Count : 8.25 K/uL  RBC Count : 3.50 M/uL  Hemoglobin : 10.2 g/dL  Hematocrit : 32.1 %  Platelet Count - Automated : 190 K/uL  Mean Cell Volume : 91.7 fl  Mean Cell Hemoglobin : 29.1 pg  Mean Cell Hemoglobin Concentration : 31.8 gm/dL  Auto Neutrophil # : x  Auto Lymphocyte # : x  Auto Monocyte # : x  Auto Eosinophil # : x  Auto Basophil # : x  Auto Neutrophil % : x  Auto Lymphocyte % : x  Auto Monocyte % : x  Auto Eosinophil % : x  Auto Basophil % : x    11-21    139  |  103  |  17  ----------------------------<  111<H>  4.1   |  22  |  1.45<H>    Ca    9.2      21 Nov 2019 06:32  Mg     1.9     11-21            CBC Full  -  ( 21 Nov 2019 06:32 )  WBC Count : 8.25 K/uL  RBC Count : 3.50 M/uL  Hemoglobin : 10.2 g/dL  Hematocrit : 32.1 %  Platelet Count - Automated : 190 K/uL  Mean Cell Volume : 91.7 fl  Mean Cell Hemoglobin : 29.1 pg  Mean Cell Hemoglobin Concentration : 31.8 gm/dL  Auto Neutrophil # : x  Auto Lymphocyte # : x  Auto Monocyte # : x  Auto Eosinophil # : x  Auto Basophil # : x  Auto Neutrophil % : x  Auto Lymphocyte % : x  Auto Monocyte % : x  Auto Eosinophil % : x  Auto Basophil % : x    11-21    139  |  103  |  17  ----------------------------<  111<H>  4.1   |  22  |  1.45<H>    Ca    9.2      21 Nov 2019 06:32  Mg     1.9     11-21            CAPILLARY BLOOD GLUCOSE          RADIOLOGY & ADDITIONAL TESTS:

## 2019-11-25 LAB
ANION GAP SERPL CALC-SCNC: 14 MMOL/L — SIGNIFICANT CHANGE UP (ref 5–17)
APPEARANCE UR: CLEAR — SIGNIFICANT CHANGE UP
BACTERIA # UR AUTO: NEGATIVE — SIGNIFICANT CHANGE UP
BILIRUB UR-MCNC: NEGATIVE — SIGNIFICANT CHANGE UP
BUN SERPL-MCNC: 15 MG/DL — SIGNIFICANT CHANGE UP (ref 7–23)
CALCIUM SERPL-MCNC: 8.7 MG/DL — SIGNIFICANT CHANGE UP (ref 8.4–10.5)
CHLORIDE SERPL-SCNC: 105 MMOL/L — SIGNIFICANT CHANGE UP (ref 96–108)
CO2 SERPL-SCNC: 21 MMOL/L — LOW (ref 22–31)
COLOR SPEC: COLORLESS — SIGNIFICANT CHANGE UP
CREAT SERPL-MCNC: 1.42 MG/DL — HIGH (ref 0.5–1.3)
DIFF PNL FLD: NEGATIVE — SIGNIFICANT CHANGE UP
EPI CELLS # UR: 0 /HPF — SIGNIFICANT CHANGE UP
GLUCOSE SERPL-MCNC: 107 MG/DL — HIGH (ref 70–99)
GLUCOSE UR QL: NEGATIVE — SIGNIFICANT CHANGE UP
HCT VFR BLD CALC: 31.5 % — LOW (ref 39–50)
HGB BLD-MCNC: 9.9 G/DL — LOW (ref 13–17)
HYALINE CASTS # UR AUTO: 0 /LPF — SIGNIFICANT CHANGE UP (ref 0–2)
KETONES UR-MCNC: NEGATIVE — SIGNIFICANT CHANGE UP
LDH SERPL L TO P-CCNC: 213 U/L — SIGNIFICANT CHANGE UP (ref 50–242)
LEUKOCYTE ESTERASE UR-ACNC: ABNORMAL
MAGNESIUM SERPL-MCNC: 1.8 MG/DL — SIGNIFICANT CHANGE UP (ref 1.6–2.6)
MCHC RBC-ENTMCNC: 28.7 PG — SIGNIFICANT CHANGE UP (ref 27–34)
MCHC RBC-ENTMCNC: 31.4 GM/DL — LOW (ref 32–36)
MCV RBC AUTO: 91.3 FL — SIGNIFICANT CHANGE UP (ref 80–100)
NITRITE UR-MCNC: NEGATIVE — SIGNIFICANT CHANGE UP
NRBC # BLD: 0 /100 WBCS — SIGNIFICANT CHANGE UP (ref 0–0)
PH UR: 7 — SIGNIFICANT CHANGE UP (ref 5–8)
PHOSPHATE SERPL-MCNC: 2.6 MG/DL — SIGNIFICANT CHANGE UP (ref 2.5–4.5)
PLATELET # BLD AUTO: 172 K/UL — SIGNIFICANT CHANGE UP (ref 150–400)
POTASSIUM SERPL-MCNC: 4 MMOL/L — SIGNIFICANT CHANGE UP (ref 3.5–5.3)
POTASSIUM SERPL-SCNC: 4 MMOL/L — SIGNIFICANT CHANGE UP (ref 3.5–5.3)
PROT UR-MCNC: NEGATIVE — SIGNIFICANT CHANGE UP
RBC # BLD: 3.45 M/UL — LOW (ref 4.2–5.8)
RBC # FLD: 14.7 % — HIGH (ref 10.3–14.5)
RBC CASTS # UR COMP ASSIST: 2 /HPF — SIGNIFICANT CHANGE UP (ref 0–4)
SODIUM SERPL-SCNC: 140 MMOL/L — SIGNIFICANT CHANGE UP (ref 135–145)
SP GR SPEC: 1.01 — LOW (ref 1.01–1.02)
UROBILINOGEN FLD QL: NEGATIVE — SIGNIFICANT CHANGE UP
WBC # BLD: 6.27 K/UL — SIGNIFICANT CHANGE UP (ref 3.8–10.5)
WBC # FLD AUTO: 6.27 K/UL — SIGNIFICANT CHANGE UP (ref 3.8–10.5)
WBC UR QL: 13 /HPF — HIGH (ref 0–5)

## 2019-11-25 PROCEDURE — 71045 X-RAY EXAM CHEST 1 VIEW: CPT | Mod: 26

## 2019-11-25 PROCEDURE — 70450 CT HEAD/BRAIN W/O DYE: CPT | Mod: 26

## 2019-11-25 RX ORDER — RIVASTIGMINE 4.6 MG/24H
1 PATCH, EXTENDED RELEASE TRANSDERMAL EVERY 24 HOURS
Refills: 0 | Status: DISCONTINUED | OUTPATIENT
Start: 2019-11-25 | End: 2019-12-04

## 2019-11-25 RX ORDER — PIPERACILLIN AND TAZOBACTAM 4; .5 G/20ML; G/20ML
3.38 INJECTION, POWDER, LYOPHILIZED, FOR SOLUTION INTRAVENOUS ONCE
Refills: 0 | Status: DISCONTINUED | OUTPATIENT
Start: 2019-11-25 | End: 2019-11-25

## 2019-11-25 RX ORDER — PIPERACILLIN AND TAZOBACTAM 4; .5 G/20ML; G/20ML
3.38 INJECTION, POWDER, LYOPHILIZED, FOR SOLUTION INTRAVENOUS EVERY 8 HOURS
Refills: 0 | Status: COMPLETED | OUTPATIENT
Start: 2019-11-25 | End: 2019-11-26

## 2019-11-25 RX ADMIN — SODIUM CHLORIDE 100 MILLILITER(S): 9 INJECTION, SOLUTION INTRAVENOUS at 01:19

## 2019-11-25 RX ADMIN — ATORVASTATIN CALCIUM 10 MILLIGRAM(S): 80 TABLET, FILM COATED ORAL at 23:01

## 2019-11-25 RX ADMIN — TAMSULOSIN HYDROCHLORIDE 0.4 MILLIGRAM(S): 0.4 CAPSULE ORAL at 23:01

## 2019-11-25 RX ADMIN — PIPERACILLIN AND TAZOBACTAM 25 GRAM(S): 4; .5 INJECTION, POWDER, LYOPHILIZED, FOR SOLUTION INTRAVENOUS at 23:01

## 2019-11-25 RX ADMIN — PIPERACILLIN AND TAZOBACTAM 25 GRAM(S): 4; .5 INJECTION, POWDER, LYOPHILIZED, FOR SOLUTION INTRAVENOUS at 15:57

## 2019-11-25 RX ADMIN — AMLODIPINE BESYLATE 10 MILLIGRAM(S): 2.5 TABLET ORAL at 06:00

## 2019-11-25 RX ADMIN — SENNA PLUS 2 TABLET(S): 8.6 TABLET ORAL at 23:01

## 2019-11-25 RX ADMIN — PIPERACILLIN AND TAZOBACTAM 25 GRAM(S): 4; .5 INJECTION, POWDER, LYOPHILIZED, FOR SOLUTION INTRAVENOUS at 06:00

## 2019-11-25 RX ADMIN — RIVASTIGMINE 1 PATCH: 4.6 PATCH, EXTENDED RELEASE TRANSDERMAL at 18:43

## 2019-11-25 RX ADMIN — RIVASTIGMINE 1 PATCH: 4.6 PATCH, EXTENDED RELEASE TRANSDERMAL at 18:42

## 2019-11-25 RX ADMIN — RIVASTIGMINE 1 PATCH: 4.6 PATCH, EXTENDED RELEASE TRANSDERMAL at 17:29

## 2019-11-25 RX ADMIN — SODIUM CHLORIDE 100 MILLILITER(S): 9 INJECTION, SOLUTION INTRAVENOUS at 06:29

## 2019-11-25 RX ADMIN — RIVASTIGMINE 2 PATCH: 4.6 PATCH, EXTENDED RELEASE TRANSDERMAL at 03:00

## 2019-11-25 RX ADMIN — SODIUM CHLORIDE 100 MILLILITER(S): 9 INJECTION, SOLUTION INTRAVENOUS at 23:01

## 2019-11-25 NOTE — PROGRESS NOTE ADULT - PROBLEM SELECTOR PLAN 2
with worsening mental status s will reculture  will check a CT scan of his head. May need an MRI  Patient with a metabolic encephalopathy due to his UTI

## 2019-11-25 NOTE — PROGRESS NOTE ADULT - ASSESSMENT
89 yo male recently admitted to [facility] for hematuria presents with a change in mental status. Patient found to be very lethargic and unable to ambulate. In ED Patient was found to have a + UA with suggested Urinary tract infection. Patient very lethargic today.

## 2019-11-25 NOTE — PROGRESS NOTE ADULT - SUBJECTIVE AND OBJECTIVE BOX
Patient is a 89y old  Male who presents with a chief complaint of change in mental status  Hx of CAD s/p PCI x2, TIA on plavix, Atrial Fibrillation on no AC, HLD, HTN, Dementia, multiple UTI presents with son (who left before triage) with report of AMS. Per triage note, patient currently being treated with antibiotics for UTI. Patient reportedly more confused from baseline, no longer ambulating, weaker. No other review of systems known at this time, patient unable to provide further hx. Meds, allergies per charting, otherwise unable to obtain from patient. Patient last admitted - due to hematuria, UTI. Patient found to have a positive UA in ED.   Patient continue to improve with abx.  Patient today more lethargic again c/w yesterday  Daughter at bedside  Sleepy but arousable  then goes right back to sleep.  Patient starting to get stronger,  his BP elevated  - will start patient on amlodipine 10 mg po qd  for BP control.  Patient seen today with increased lethargy. Difficult to arouse. No new or sedating meds given.       MEDICATIONS  (STANDING):  amLODIPine   Tablet 10 milliGRAM(s) Oral daily  atorvastatin 10 milliGRAM(s) Oral at bedtime  dextrose 5% + sodium chloride 0.45%. 1000 milliLiter(s) (100 mL/Hr) IV Continuous <Continuous>  finasteride 5 milliGRAM(s) Oral daily  piperacillin/tazobactam IVPB. 3.375 Gram(s) IV Intermittent once  piperacillin/tazobactam IVPB.. 3.375 Gram(s) IV Intermittent every 8 hours  rivastigmine patch  9.5 mG/24 Hr(s) 1 Patch Transdermal every 24 hours  senna 2 Tablet(s) Oral at bedtime  tamsulosin 0.4 milliGRAM(s) Oral at bedtime    MEDICATIONS  (PRN):  acetaminophen    Suspension .. 650 milliGRAM(s) Oral every 4 hours PRN Temp greater or equal to 38C (100.4F), Mild Pain (1 - 3)  polyethylene glycol 3350 17 Gram(s) Oral at bedtime PRN Constipation          VITALS:   T(C): 36.3 (19 @ 12:46), Max: 36.6 (19 @ 04:36)  HR: 57 (19 @ 12:46) (55 - 67)  BP: 121/77 (19 @ 12:46) (121/77 - 161/82)  RR: 18 (19 @ 12:46) (18 - 18)  SpO2: 96% (19 @ 12:46) (96% - 97%)  Wt(kg): --    PHYSICAL EXAM:  GENERAL: NAD, well nourished and conversant  HEAD:  Atraumatic  EYES: EOM, PERRLA, conjunctiva pink and sclera white  ENT: No tonsillar erythema, exudates, or enlargement, moist mucous membranes, good dentition, no lesions  NECK: Supple, No JVD, normal thyroid, carotids with normal upstrokes and no bruits  CHEST/LUNG: Clear to auscultation bilaterally, No rales, rhonchi, wheezing, or rubs  HEART: Regular rate and rhythm, No murmurs, rubs, or gallops  ABDOMEN: Soft, nondistended, no masses, guarding, tenderness or rebound, bowel sounds present  EXTREMITIES:  2+ Peripheral Pulses, No clubbing, cyanosis, or edema. No arthritis of shoulders, elbows, hands, hips, knees, ankles, or feet. No DJD C spine, T spine, or L/S spine  LYMPH: No lymphadenopathy noted  SKIN: No rashes or lesions  NERVOUS SYSTEM: lethargic and confused    LABS:        CBC Full  -  ( 2019 06:24 )  WBC Count : 6.27 K/uL  RBC Count : 3.45 M/uL  Hemoglobin : 9.9 g/dL  Hematocrit : 31.5 %  Platelet Count - Automated : 172 K/uL  Mean Cell Volume : 91.3 fl  Mean Cell Hemoglobin : 28.7 pg  Mean Cell Hemoglobin Concentration : 31.4 gm/dL  Auto Neutrophil # : x  Auto Lymphocyte # : x  Auto Monocyte # : x  Auto Eosinophil # : x  Auto Basophil # : x  Auto Neutrophil % : x  Auto Lymphocyte % : x  Auto Monocyte % : x  Auto Eosinophil % : x  Auto Basophil % : x        140  |  105  |  15  ----------------------------<  107<H>  4.0   |  21<L>  |  1.42<H>    Ca    8.7      2019 06:24  Phos  2.6       Mg     1.8               Urinalysis Basic - ( 2019 16:36 )    Color: Colorless / Appearance: Clear / S.009 / pH: x  Gluc: x / Ketone: Negative  / Bili: Negative / Urobili: Negative   Blood: x / Protein: Negative / Nitrite: Negative   Leuk Esterase: Moderate / RBC: 2 /hpf / WBC 13 /HPF   Sq Epi: x / Non Sq Epi: 0 /hpf / Bacteria: Negative      CAPILLARY BLOOD GLUCOSE          RADIOLOGY & ADDITIONAL TESTS:

## 2019-11-25 NOTE — CHART NOTE - NSCHARTNOTEFT_GEN_A_CORE
Seen and examined pt. in AM. Pt. was seen to be increasingly lethargic and only responsive to pain. Seen and examined pt. in AM. Pt. was seen to be lethargic and only responsive to pain. Dr. Franco was made aware and recommended pan culture and CT head. Will continue to monitor. Will F/U in morning.    Memo Moore PA-C (Medicine PA)  Spectralink [id number]

## 2019-11-26 ENCOUNTER — TRANSCRIPTION ENCOUNTER (OUTPATIENT)
Age: 84
End: 2019-11-26

## 2019-11-26 LAB
ANION GAP SERPL CALC-SCNC: 12 MMOL/L — SIGNIFICANT CHANGE UP (ref 5–17)
ANION GAP SERPL CALC-SCNC: 8 MMOL/L — SIGNIFICANT CHANGE UP (ref 5–17)
BUN SERPL-MCNC: 12 MG/DL — SIGNIFICANT CHANGE UP (ref 7–23)
BUN SERPL-MCNC: 16 MG/DL — SIGNIFICANT CHANGE UP (ref 7–23)
CALCIUM SERPL-MCNC: 6.9 MG/DL — LOW (ref 8.4–10.5)
CALCIUM SERPL-MCNC: 8.8 MG/DL — SIGNIFICANT CHANGE UP (ref 8.4–10.5)
CHLORIDE SERPL-SCNC: 101 MMOL/L — SIGNIFICANT CHANGE UP (ref 96–108)
CHLORIDE SERPL-SCNC: 106 MMOL/L — SIGNIFICANT CHANGE UP (ref 96–108)
CO2 SERPL-SCNC: 19 MMOL/L — LOW (ref 22–31)
CO2 SERPL-SCNC: 23 MMOL/L — SIGNIFICANT CHANGE UP (ref 22–31)
CREAT SERPL-MCNC: 1.03 MG/DL — SIGNIFICANT CHANGE UP (ref 0.5–1.3)
CREAT SERPL-MCNC: 1.33 MG/DL — HIGH (ref 0.5–1.3)
CULTURE RESULTS: NO GROWTH — SIGNIFICANT CHANGE UP
GLUCOSE SERPL-MCNC: 1034 MG/DL — CRITICAL HIGH (ref 70–99)
GLUCOSE SERPL-MCNC: 118 MG/DL — HIGH (ref 70–99)
HCT VFR BLD CALC: 28.5 % — LOW (ref 39–50)
HGB BLD-MCNC: 9.3 G/DL — LOW (ref 13–17)
MCHC RBC-ENTMCNC: 30 PG — SIGNIFICANT CHANGE UP (ref 27–34)
MCHC RBC-ENTMCNC: 32.6 GM/DL — SIGNIFICANT CHANGE UP (ref 32–36)
MCV RBC AUTO: 91.9 FL — SIGNIFICANT CHANGE UP (ref 80–100)
NRBC # BLD: 0 /100 WBCS — SIGNIFICANT CHANGE UP (ref 0–0)
PLATELET # BLD AUTO: 155 K/UL — SIGNIFICANT CHANGE UP (ref 150–400)
POTASSIUM SERPL-MCNC: 3.1 MMOL/L — LOW (ref 3.5–5.3)
POTASSIUM SERPL-MCNC: 3.7 MMOL/L — SIGNIFICANT CHANGE UP (ref 3.5–5.3)
POTASSIUM SERPL-SCNC: 3.1 MMOL/L — LOW (ref 3.5–5.3)
POTASSIUM SERPL-SCNC: 3.7 MMOL/L — SIGNIFICANT CHANGE UP (ref 3.5–5.3)
RBC # BLD: 3.1 M/UL — LOW (ref 4.2–5.8)
RBC # FLD: 14.9 % — HIGH (ref 10.3–14.5)
SODIUM SERPL-SCNC: 128 MMOL/L — LOW (ref 135–145)
SODIUM SERPL-SCNC: 141 MMOL/L — SIGNIFICANT CHANGE UP (ref 135–145)
SPECIMEN SOURCE: SIGNIFICANT CHANGE UP
WBC # BLD: 6.11 K/UL — SIGNIFICANT CHANGE UP (ref 3.8–10.5)
WBC # FLD AUTO: 6.11 K/UL — SIGNIFICANT CHANGE UP (ref 3.8–10.5)

## 2019-11-26 RX ORDER — SODIUM CHLORIDE 9 MG/ML
1000 INJECTION, SOLUTION INTRAVENOUS
Refills: 0 | Status: DISCONTINUED | OUTPATIENT
Start: 2019-11-26 | End: 2019-11-30

## 2019-11-26 RX ADMIN — PIPERACILLIN AND TAZOBACTAM 25 GRAM(S): 4; .5 INJECTION, POWDER, LYOPHILIZED, FOR SOLUTION INTRAVENOUS at 06:09

## 2019-11-26 RX ADMIN — RIVASTIGMINE 1 PATCH: 4.6 PATCH, EXTENDED RELEASE TRANSDERMAL at 20:08

## 2019-11-26 RX ADMIN — PIPERACILLIN AND TAZOBACTAM 25 GRAM(S): 4; .5 INJECTION, POWDER, LYOPHILIZED, FOR SOLUTION INTRAVENOUS at 15:19

## 2019-11-26 RX ADMIN — SODIUM CHLORIDE 50 MILLILITER(S): 9 INJECTION, SOLUTION INTRAVENOUS at 20:08

## 2019-11-26 RX ADMIN — RIVASTIGMINE 1 PATCH: 4.6 PATCH, EXTENDED RELEASE TRANSDERMAL at 17:57

## 2019-11-26 RX ADMIN — FINASTERIDE 5 MILLIGRAM(S): 5 TABLET, FILM COATED ORAL at 12:52

## 2019-11-26 RX ADMIN — RIVASTIGMINE 1 PATCH: 4.6 PATCH, EXTENDED RELEASE TRANSDERMAL at 18:00

## 2019-11-26 RX ADMIN — AMLODIPINE BESYLATE 10 MILLIGRAM(S): 2.5 TABLET ORAL at 06:09

## 2019-11-26 RX ADMIN — ATORVASTATIN CALCIUM 10 MILLIGRAM(S): 80 TABLET, FILM COATED ORAL at 21:11

## 2019-11-26 RX ADMIN — TAMSULOSIN HYDROCHLORIDE 0.4 MILLIGRAM(S): 0.4 CAPSULE ORAL at 21:11

## 2019-11-26 RX ADMIN — SENNA PLUS 2 TABLET(S): 8.6 TABLET ORAL at 21:11

## 2019-11-26 NOTE — DISCHARGE NOTE PROVIDER - CARE PROVIDER_API CALL
Jimym Franco)  Internal Medicine  [address] [place] [address]  Phone: [phone]  Fax: [phone]  Follow Up Time: Jimmy Franco)  Internal Medicine  [address] [id number]  Phone: [phone]  Fax: [phone]  Follow Up Time:     Goldberg, Gary D D (MD)  Urology  [address] [place] [address], [facility] [address]  [place] [address]  Phone: [phone]  Fax: [phone]  Follow Up Time:

## 2019-11-26 NOTE — DISCHARGE NOTE PROVIDER - NSDCMRMEDTOKEN_GEN_ALL_CORE_FT
acetaminophen 160 mg/5 mL oral suspension: 20.31 milliliter(s) orally every 4 hours, As needed, Mild Pain (1 - 3)  atorvastatin 10 mg oral tablet: 1 tab(s) orally once a day  Calcium 600+D 600 mg-200 units oral tablet: 1 tab(s) orally once a day  docusate sodium 100 mg oral capsule: 1 cap(s) orally 3 times a day  Exelon 9.5 mg/24 hr transdermal film, extended release: 1 patch transdermal once a day  Flomax 0.4 mg oral capsule: 1 cap(s) orally once a day (at bedtime)  multivitamin with iron: 1 tab(s) orally once a day  polyethylene glycol 3350 oral powder for reconstitution: 17 gram(s) orally once a day (at bedtime), As needed, Constipation  Proscar 5 mg oral tablet: 1 tab(s) orally once a day  Saw Palmetto: 450 milligram(s) orally 2 times a day  senna oral tablet: 2 tab(s) orally once a day (at bedtime) acetaminophen 160 mg/5 mL oral suspension: 20.31 milliliter(s) orally every 4 hours, As needed, Mild Pain (1 - 3)  amLODIPine 10 mg oral tablet: 1 tab(s) orally once a day  atorvastatin 10 mg oral tablet: 1 tab(s) orally once a day  Calcium 600+D 600 mg-200 units oral tablet: 1 tab(s) orally once a day  docusate sodium 100 mg oral capsule: 1 cap(s) orally 3 times a day  ertapenem 1 g injection: 1 gram(s) intravenous once a day until 12/7/19.  Exelon 9.5 mg/24 hr transdermal film, extended release: 1 patch transdermal once a day  Flomax 0.4 mg oral capsule: 1 cap(s) orally once a day (at bedtime)  multivitamin with iron: 1 tab(s) orally once a day  polyethylene glycol 3350 oral powder for reconstitution: 17 gram(s) orally once a day (at bedtime), As needed, Constipation  Proscar 5 mg oral tablet: 1 tab(s) orally once a day  Saw Palmetto: 450 milligram(s) orally 2 times a day  senna oral tablet: 2 tab(s) orally once a day (at bedtime)

## 2019-11-26 NOTE — PROGRESS NOTE ADULT - ATTENDING COMMENTS
Patient seen today with increased lethargy. Difficult to arouse. No new or sedating meds given. Today he is more alert and responsive. To proceed with MRI of the brain

## 2019-11-26 NOTE — DISCHARGE NOTE PROVIDER - CARE PROVIDERS DIRECT ADDRESSES
,DirectAddress_Unknown ,DirectAddress_Unknown,[email or web address]

## 2019-11-26 NOTE — PROGRESS NOTE ADULT - ASSESSMENT
89 yo male recently admitted to [facility] for hematuria presents with a change in mental status. Patient found to be very lethargic and unable to ambulate. In ED Patient was found to have a + UA with suggested Urinary tract infection. Patient very lethargic today. 89 yo male recently admitted to [facility] for hematuria presents with a change in mental status. Patient found to be very lethargic and unable to ambulate. In ED Patient was found to have a + UA with suggested Urinary tract infection. Patient very lethargic yesterday; alert and responsive today.

## 2019-11-26 NOTE — DISCHARGE NOTE PROVIDER - PROVIDER TOKENS
PROVIDER:[TOKEN:[[id number]]] PROVIDER:[TOKEN:[[id number]]],PROVIDER:[TOKEN:[[id number]]]

## 2019-11-26 NOTE — DISCHARGE NOTE PROVIDER - HOSPITAL COURSE
89-year-old Male with past medical history of AFib, hypertension, hyperlipidemia, and dementia presented, admitted with lethargy and UIT, 89-year-old Male with past medical history of AFib, hypertension, hyperlipidemia, and dementia presented, admitted with lethargy and UTI .    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    Pt's MS 89-year-old Male with past medical history of AFib, hypertension, hyperlipidemia, and dementia presented, admitted with lethargy and UTI .    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     Hospital  stay complicated with Episodes of  Lethargy  , Repeat blood c/s --------    CT head repeated no changes -------    MRI head  ----------- Patient is a 89y old  Male who presents with a chief complaint of change in mental status  Hx of CAD s/p PCI x2, TIA on plavix, Atrial Fibrillation on no AC, HLD, HTN, Dementia, multiple UTI presented with report of AMS.  Per triage note, patient was being treated with antibiotics for UTI.  Patient reportedly more confused from baseline, no longer ambulating, weaker.  Patient found to have a positive UA in the ED which suggested active urinary tract infection and patient was treated with IV antibiotics.  Patient with waxing and waning mental status throughout hospital course, also had an episode of hypothermia.   A.m. and p.m. cortisol levels are normal and T3, T4 and TSH also normal to rule out occult hypothyroidism.  Ammonia level also normal.  CTH revealed no acute findings.  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  Patient is medically cleared by attending - is going to [facility].                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     Hospital  stay complicated with Episodes of  Lethargy  , Repeat blood c/s --------    CT head repeated no changes ------- Patient is a 89y old  Male who presents with a chief complaint of change in mental status  Hx of CAD s/p PCI x2, TIA on plavix, Atrial Fibrillation on no AC, HLD, HTN, Dementia, multiple UTI presented with report of AMS.  Per triage note, patient was being treated with antibiotics for UTI.  Patient reportedly more confused from baseline, no longer ambulating, weaker.  Patient found to have a positive UA in the ED which suggested active urinary tract infection and patient was treated with IV antibiotics.  Patient with waxing and waning mental status throughout hospital course, also had an episode of hypothermia.   A.m. and p.m. cortisol levels are normal and T3, T4 and TSH also normal to rule out occult hypothyroidism.  Ammonia level also normal. Neurology consulted;   CTH revealed no acute findings.  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  Patient is medically cleared by attending - is going to [facility].                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     Hospital  stay complicated with Episodes of  Lethargy  , Repeat blood c/s --------    CT head repeated no changes ------- Patient is a 89y old  Male who presents with a chief complaint of change in mental status  Hx of CAD s/p PCI x2, TIA on plavix, Atrial Fibrillation on no AC, HLD, HTN, Dementia, multiple UTI presented with report of AMS.  Per triage note, patient was being treated with antibiotics for UTI.  Patient reportedly more confused from baseline, no longer ambulating, weaker.  Patient found to have a positive UA in the ED which suggested active urinary tract infection and patient was treated with IV antibiotics.  Patient with waxing and waning mental status throughout hospital course, also had an episode of hypothermia.   A.m. and p.m. cortisol levels are normal and T3, T4 and TSH also normal to rule out occult hypothyroidism.  Ammonia level also normal. Neurology consulted;   CTH revealed no acute findings.  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 Pt hypothermic on 11/30/19 and given one dose of vancomycin. UCx growing ESBL Proteus ; Pt denies symptoms but ROS reliability questionable    Seen by ID; Plan for 5 days Invanz. Midline placed; Pt discharged to [facility].

## 2019-11-26 NOTE — PROGRESS NOTE ADULT - SUBJECTIVE AND OBJECTIVE BOX
Patient is a 89y old  Male who presents with a chief complaint of change in mental status  Hx of CAD s/p PCI x2, TIA on plavix, Atrial Fibrillation on no AC, HLD, HTN, Dementia, multiple UTI presents with son (who left before triage) with report of AMS. Per triage note, patient currently being treated with antibiotics for UTI. Patient reportedly more confused from baseline, no longer ambulating, weaker. No other review of systems known at this time, patient unable to provide further hx. Meds, allergies per charting, otherwise unable to obtain from patient. Patient last admitted - due to hematuria, UTI. Patient found to have a positive UA in ED.   Patient continue to improve with abx.  Patient today more lethargic again c/w yesterday  Daughter at bedside  Sleepy but arousable  then goes right back to sleep.  Patient starting to get stronger,  his BP elevated  - will start patient on amlodipine 10 mg po qd  for BP control.  Patient seen today with increased lethargy. Difficult to arouse. No new or sedating meds given.     MEDICATIONS  (STANDING):  amLODIPine   Tablet 10 milliGRAM(s) Oral daily  atorvastatin 10 milliGRAM(s) Oral at bedtime  dextrose 5% + sodium chloride 0.45%. 1000 milliLiter(s) (100 mL/Hr) IV Continuous <Continuous>  finasteride 5 milliGRAM(s) Oral daily  piperacillin/tazobactam IVPB.. 3.375 Gram(s) IV Intermittent every 8 hours  rivastigmine patch  9.5 mG/24 Hr(s) 1 Patch Transdermal every 24 hours  senna 2 Tablet(s) Oral at bedtime  tamsulosin 0.4 milliGRAM(s) Oral at bedtime    MEDICATIONS  (PRN):  acetaminophen    Suspension .. 650 milliGRAM(s) Oral every 4 hours PRN Temp greater or equal to 38C (100.4F), Mild Pain (1 - 3)  polyethylene glycol 3350 17 Gram(s) Oral at bedtime PRN Constipation      Vital Signs Last 24 Hrs  T(C): 36.5 (2019 06:06), Max: 36.6 (2019 20:46)  T(F): 97.7 (2019 06:06), Max: 97.9 (2019 20:46)  HR: 53 (2019 06:06) (53 - 57)  BP: 128/71 (2019 06:06) (121/77 - 148/78)  BP(mean): --  RR: 18 (2019 06:06) (18 - 18)  SpO2: 96% (2019 06:06) (96% - 96%)    PHYSICAL EXAM:  GENERAL: NAD, well nourished and conversant  HEAD:  Atraumatic  EYES: EOM, PERRLA, conjunctiva pink and sclera white  ENT: No tonsillar erythema, exudates, or enlargement, moist mucous membranes, good dentition, no lesions  NECK: Supple, No JVD, normal thyroid, carotids with normal upstrokes and no bruits  CHEST/LUNG: Clear to auscultation bilaterally, No rales, rhonchi, wheezing, or rubs  HEART: Regular rate and rhythm, No murmurs, rubs, or gallops  ABDOMEN: Soft, nondistended, no masses, guarding, tenderness or rebound, bowel sounds present  EXTREMITIES:  2+ Peripheral Pulses, No clubbing, cyanosis, or edema. No arthritis of shoulders, elbows, hands, hips, knees, ankles, or feet. No DJD C spine, T spine, or L/S spine  LYMPH: No lymphadenopathy noted  SKIN: No rashes or lesions  NERVOUS SYSTEM: lethargic and confused    LABS:    CBC Full  -  ( 2019 07:03 )  WBC Count : 6.11 K/uL  RBC Count : 3.10 M/uL  Hemoglobin : 9.3 g/dL  Hematocrit : 28.5 %  Platelet Count - Automated : 155 K/uL  Mean Cell Volume : 91.9 fl  Mean Cell Hemoglobin : 30.0 pg  Mean Cell Hemoglobin Concentration : 32.6 gm/dL  Auto Neutrophil # : x  Auto Lymphocyte # : x  Auto Monocyte # : x  Auto Eosinophil # : x  Auto Basophil # : x  Auto Neutrophil % : x  Auto Lymphocyte % : x  Auto Monocyte % : x  Auto Eosinophil % : x  Auto Basophil % : x        141  |  106  |  16  ----------------------------<  118<H>  3.7   |  23  |  1.33<H>    Ca    8.8      2019 08:58  Phos  2.6     11-25  Mg     1.8     11-25          Urinalysis Basic - ( 2019 16:36 )    Color: Colorless / Appearance: Clear / S.009 / pH: x  Gluc: x / Ketone: Negative  / Bili: Negative / Urobili: Negative   Blood: x / Protein: Negative / Nitrite: Negative   Leuk Esterase: Moderate / RBC: 2 /hpf / WBC 13 /HPF   Sq Epi: x / Non Sq Epi: 0 /hpf / Bacteria: Negative          RADIOLOGY & ADDITIONAL TESTS: Patient is a 89y old  Male who presents with a chief complaint of change in mental status  Hx of CAD s/p PCI x2, TIA on plavix, Atrial Fibrillation on no AC, HLD, HTN, Dementia, multiple UTI presents with son (who left before triage) with report of AMS. Per triage note, patient currently being treated with antibiotics for UTI. Patient reportedly more confused from baseline, no longer ambulating, weaker. No other review of systems known at this time, patient unable to provide further hx. Meds, allergies per charting, otherwise unable to obtain from patient. Patient last admitted - due to hematuria, UTI. Patient found to have a positive UA in ED.   Patient continue to improve with abx.  Patient today more lethargic again c/w yesterday  Daughter at bedside  Sleepy but arousable  then goes right back to sleep.  Patient starting to get stronger,  his BP elevated  - will start patient on amlodipine 10 mg po qd  for BP control.  Patient seen today with increased lethargy. Difficult to arouse. No new or sedating meds given. Today he is more alert and responsive. To proceed with MRI of the brain    MEDICATIONS  (STANDING):  amLODIPine   Tablet 10 milliGRAM(s) Oral daily  atorvastatin 10 milliGRAM(s) Oral at bedtime  dextrose 5% + sodium chloride 0.45%. 1000 milliLiter(s) (100 mL/Hr) IV Continuous <Continuous>  finasteride 5 milliGRAM(s) Oral daily  piperacillin/tazobactam IVPB.. 3.375 Gram(s) IV Intermittent every 8 hours  rivastigmine patch  9.5 mG/24 Hr(s) 1 Patch Transdermal every 24 hours  senna 2 Tablet(s) Oral at bedtime  tamsulosin 0.4 milliGRAM(s) Oral at bedtime    MEDICATIONS  (PRN):  acetaminophen    Suspension .. 650 milliGRAM(s) Oral every 4 hours PRN Temp greater or equal to 38C (100.4F), Mild Pain (1 - 3)  polyethylene glycol 3350 17 Gram(s) Oral at bedtime PRN Constipation      Vital Signs Last 24 Hrs  T(C): 36.5 (2019 06:06), Max: 36.6 (2019 20:46)  T(F): 97.7 (2019 06:06), Max: 97.9 (2019 20:46)  HR: 53 (2019 06:06) (53 - 57)  BP: 128/71 (2019 06:06) (121/77 - 148/78)  BP(mean): --  RR: 18 (2019 06:06) (18 - 18)  SpO2: 96% (2019 06:06) (96% - 96%)    PHYSICAL EXAM:  GENERAL: NAD, well nourished and conversant  HEAD:  Atraumatic  EYES: EOM, PERRLA, conjunctiva pink and sclera white  ENT: No tonsillar erythema, exudates, or enlargement, moist mucous membranes, good dentition, no lesions  NECK: Supple, No JVD, normal thyroid, carotids with normal upstrokes and no bruits  CHEST/LUNG: Clear to auscultation bilaterally, No rales, rhonchi, wheezing, or rubs  HEART: Regular rate and rhythm, No murmurs, rubs, or gallops  ABDOMEN: Soft, nondistended, no masses, guarding, tenderness or rebound, bowel sounds present  EXTREMITIES:  2+ Peripheral Pulses, No clubbing, cyanosis, or edema. No arthritis of shoulders, elbows, hands, hips, knees, ankles, or feet. No DJD C spine, T spine, or L/S spine  LYMPH: No lymphadenopathy noted  SKIN: No rashes or lesions  NERVOUS SYSTEM: lethargic and confused    LABS:    CBC Full  -  ( 2019 07:03 )  WBC Count : 6.11 K/uL  RBC Count : 3.10 M/uL  Hemoglobin : 9.3 g/dL  Hematocrit : 28.5 %  Platelet Count - Automated : 155 K/uL  Mean Cell Volume : 91.9 fl  Mean Cell Hemoglobin : 30.0 pg  Mean Cell Hemoglobin Concentration : 32.6 gm/dL  Auto Neutrophil # : x  Auto Lymphocyte # : x  Auto Monocyte # : x  Auto Eosinophil # : x  Auto Basophil # : x  Auto Neutrophil % : x  Auto Lymphocyte % : x  Auto Monocyte % : x  Auto Eosinophil % : x  Auto Basophil % : x    11-    141  |  106  |  16  ----------------------------<  118<H>  3.7   |  23  |  1.33<H>    Ca    8.8      2019 08:58  Phos  2.6     11-25  Mg     1.8     -25          Urinalysis Basic - ( 2019 16:36 )    Color: Colorless / Appearance: Clear / S.009 / pH: x  Gluc: x / Ketone: Negative  / Bili: Negative / Urobili: Negative   Blood: x / Protein: Negative / Nitrite: Negative   Leuk Esterase: Moderate / RBC: 2 /hpf / WBC 13 /HPF   Sq Epi: x / Non Sq Epi: 0 /hpf / Bacteria: Negative          RADIOLOGY & ADDITIONAL TESTS:

## 2019-11-26 NOTE — DISCHARGE NOTE PROVIDER - NSDCCPCAREPLAN_GEN_ALL_CORE_FT
PRINCIPAL DISCHARGE DIAGNOSIS  Diagnosis: UTI (urinary tract infection)  Assessment and Plan of Treatment: completed a course abx   ****  Call you Health care provider upon arrival home to make a one week follow up appointment.  If you develop fever, chills, malaise, or change in mental status call your Health Care Provider or go to the Emergency Department.  Nutrition is important, eat small frequent meals to help ensure you get adequate calories.  Do not stay in bed all day!  Increase your activity daily as tolerated.        SECONDARY DISCHARGE DIAGNOSES  Diagnosis: HTN (hypertension)  Assessment and Plan of Treatment: Follow up with your medical doctor to establish long term blood pressure treatment goals.      Diagnosis: Dementia  Assessment and Plan of Treatment: Dementia care: Create a safe environment. Remove locks on bathroom doors. Cover electrical outlets, use childproof latched on cabinets. Install childproof devices to keep doors and windows secured. Childproof stoves , ovens, medications, water temperature on water heaters. Secure knives, lighters, matches, power tools, and guns. Falls precautions, free from clutter, remove throw rugs. Insure adequate lighting. Install grab rails as needed. Obtain life line, use baby monitors. Provide 24hr /7 day per week supervision Reduce confusion. Keep familiar objects and people around. Use night lights or dim lights at night. Use reminders, notes, or directions for daily activities or tasks. Keep a simple, consistent routine for waking, meals, bathing, dressing, and bedtime. Create a calm, quiet environment. Place large clocks and calendars prominently.  Display emergency numbers and home address near all telephones. Ensure a regular walking or physical activity schedule. Involve the person in daily activities as much as possible. Limit napping during the day.  Limit caffeine. Attend social events that stimulate rather than overwhelm the senses. Encourage good nutrition and hydration. Reduce distractions during meal times and snacks. Monitor chewing and swallowing ability. Continue with routine vision, hearing, dental, and medical screenings. All medications per MD only. If change in behavior or patient becomes more confused or letharic seek medical attention.    Any new problem with brain function happens. This includes problems with balance, speech, or falling a lot.   New or worsened confusion develops. New or worsened sleepiness develops. Staying awake becomes hard to do. This could indicate infection A fever develops.      Diagnosis: AMS (altered mental status)  Assessment and Plan of Treatment: PRINCIPAL DISCHARGE DIAGNOSIS  Diagnosis: UTI (urinary tract infection)  Assessment and Plan of Treatment: Follow up with Urology for frequent UTIS  complete antibiotics  Call you Health care provider upon arrival home to make a one week follow up appointment.  If you develop fever, chills, malaise, or change in mental status call your Health Care Provider or go to the Emergency Department.  Nutrition is important, eat small frequent meals to help ensure you get adequate calories.  Do not stay in bed all day!  Increase your activity daily as tolerated.        SECONDARY DISCHARGE DIAGNOSES  Diagnosis: HTN (hypertension)  Assessment and Plan of Treatment: Follow up with your medical doctor to establish long term blood pressure treatment goals.      Diagnosis: Dementia  Assessment and Plan of Treatment: Dementia care: Create a safe environment. Remove locks on bathroom doors. Cover electrical outlets, use childproof latched on cabinets. Install childproof devices to keep doors and windows secured. Childproof stoves , ovens, medications, water temperature on water heaters. Secure knives, lighters, matches, power tools, and guns. Falls precautions, free from clutter, remove throw rugs. Insure adequate lighting. Install grab rails as needed. Obtain life line, use baby monitors. Provide 24hr /7 day per week supervision Reduce confusion. Keep familiar objects and people around. Use night lights or dim lights at night. Use reminders, notes, or directions for daily activities or tasks. Keep a simple, consistent routine for waking, meals, bathing, dressing, and bedtime. Create a calm, quiet environment. Place large clocks and calendars prominently.  Display emergency numbers and home address near all telephones. Ensure a regular walking or physical activity schedule. Involve the person in daily activities as much as possible. Limit napping during the day.  Limit caffeine. Attend social events that stimulate rather than overwhelm the senses. Encourage good nutrition and hydration. Reduce distractions during meal times and snacks. Monitor chewing and swallowing ability. Continue with routine vision, hearing, dental, and medical screenings. All medications per MD only. If change in behavior or patient becomes more confused or letharic seek medical attention.    Any new problem with brain function happens. This includes problems with balance, speech, or falling a lot.   New or worsened confusion develops. New or worsened sleepiness develops. Staying awake becomes hard to do. This could indicate infection A fever develops.      Diagnosis: AMS (altered mental status)  Assessment and Plan of Treatment: continue exelon patch

## 2019-11-27 LAB
ANION GAP SERPL CALC-SCNC: 13 MMOL/L — SIGNIFICANT CHANGE UP (ref 5–17)
BUN SERPL-MCNC: 18 MG/DL — SIGNIFICANT CHANGE UP (ref 7–23)
CALCIUM SERPL-MCNC: 9.1 MG/DL — SIGNIFICANT CHANGE UP (ref 8.4–10.5)
CHLORIDE SERPL-SCNC: 106 MMOL/L — SIGNIFICANT CHANGE UP (ref 96–108)
CO2 SERPL-SCNC: 23 MMOL/L — SIGNIFICANT CHANGE UP (ref 22–31)
CREAT SERPL-MCNC: 1.43 MG/DL — HIGH (ref 0.5–1.3)
GLUCOSE SERPL-MCNC: 100 MG/DL — HIGH (ref 70–99)
POTASSIUM SERPL-MCNC: 4.1 MMOL/L — SIGNIFICANT CHANGE UP (ref 3.5–5.3)
POTASSIUM SERPL-SCNC: 4.1 MMOL/L — SIGNIFICANT CHANGE UP (ref 3.5–5.3)
SODIUM SERPL-SCNC: 142 MMOL/L — SIGNIFICANT CHANGE UP (ref 135–145)

## 2019-11-27 PROCEDURE — 70551 MRI BRAIN STEM W/O DYE: CPT | Mod: 26

## 2019-11-27 RX ADMIN — AMLODIPINE BESYLATE 10 MILLIGRAM(S): 2.5 TABLET ORAL at 05:43

## 2019-11-27 RX ADMIN — RIVASTIGMINE 1 PATCH: 4.6 PATCH, EXTENDED RELEASE TRANSDERMAL at 18:26

## 2019-11-27 RX ADMIN — SODIUM CHLORIDE 50 MILLILITER(S): 9 INJECTION, SOLUTION INTRAVENOUS at 13:28

## 2019-11-27 RX ADMIN — ATORVASTATIN CALCIUM 10 MILLIGRAM(S): 80 TABLET, FILM COATED ORAL at 21:29

## 2019-11-27 RX ADMIN — TAMSULOSIN HYDROCHLORIDE 0.4 MILLIGRAM(S): 0.4 CAPSULE ORAL at 21:29

## 2019-11-27 RX ADMIN — RIVASTIGMINE 1 PATCH: 4.6 PATCH, EXTENDED RELEASE TRANSDERMAL at 07:00

## 2019-11-27 RX ADMIN — RIVASTIGMINE 1 PATCH: 4.6 PATCH, EXTENDED RELEASE TRANSDERMAL at 18:28

## 2019-11-27 RX ADMIN — SODIUM CHLORIDE 50 MILLILITER(S): 9 INJECTION, SOLUTION INTRAVENOUS at 21:30

## 2019-11-27 RX ADMIN — FINASTERIDE 5 MILLIGRAM(S): 5 TABLET, FILM COATED ORAL at 13:28

## 2019-11-27 NOTE — PROGRESS NOTE ADULT - ATTENDING COMMENTS
Patient seen today with increased lethargy. Difficult to arouse. No new or sedating meds given. Today he is more alert and responsive.

## 2019-11-27 NOTE — PROGRESS NOTE ADULT - SUBJECTIVE AND OBJECTIVE BOX
Patient is a 89y old  Male who presents with a chief complaint of change in mental status  Hx of CAD s/p PCI x2, TIA on plavix, Atrial Fibrillation on no AC, HLD, HTN, Dementia, multiple UTI presents with son (who left before triage) with report of AMS. Per triage note, patient currently being treated with antibiotics for UTI. Patient reportedly more confused from baseline, no longer ambulating, weaker. No other review of systems known at this time, patient unable to provide further hx. Meds, allergies per charting, otherwise unable to obtain from patient. Patient last admitted - due to hematuria, UTI. Patient found to have a positive UA in ED.   Patient continue to improve with abx.  Patient today more lethargic again c/w yesterday  Daughter at bedside  Sleepy but arousable  then goes right back to sleep.  Patient starting to get stronger,  his BP elevated  - will start patient on amlodipine 10 mg po qd  for BP control.  Patient seen today with increased lethargy. Difficult to arouse. No new or sedating meds given. Today he is more alert and responsive. Patient s/p MRI      MEDICATIONS  (STANDING):  amLODIPine   Tablet 10 milliGRAM(s) Oral daily  atorvastatin 10 milliGRAM(s) Oral at bedtime  dextrose 5% + sodium chloride 0.45%. 1000 milliLiter(s) (50 mL/Hr) IV Continuous <Continuous>  finasteride 5 milliGRAM(s) Oral daily  rivastigmine patch  9.5 mG/24 Hr(s) 1 Patch Transdermal every 24 hours  senna 2 Tablet(s) Oral at bedtime  tamsulosin 0.4 milliGRAM(s) Oral at bedtime    MEDICATIONS  (PRN):  acetaminophen    Suspension .. 650 milliGRAM(s) Oral every 4 hours PRN Temp greater or equal to 38C (100.4F), Mild Pain (1 - 3)  polyethylene glycol 3350 17 Gram(s) Oral at bedtime PRN Constipation          VITALS:   T(C): 36.2 (19 @ 13:30), Max: 36.7 (19 @ 20:34)  HR: 61 (19 @ 13:30) (57 - 65)  BP: 118/71 (19 @ 13:30) (102/61 - 125/69)  RR: 20 (19 @ 13:30) (18 - 20)  SpO2: 96% (19 @ 13:30) (95% - 96%)  Wt(kg): --    PHYSICAL EXAM:  GENERAL: NAD, well nourished and conversant  HEAD:  Atraumatic  EYES: EOM, PERRLA, conjunctiva pink and sclera white  ENT: No tonsillar erythema, exudates, or enlargement, moist mucous membranes, good dentition, no lesions  NECK: Supple, No JVD, normal thyroid, carotids with normal upstrokes and no bruits  CHEST/LUNG: Clear to auscultation bilaterally, No rales, rhonchi, wheezing, or rubs  HEART: Regular rate and rhythm, No murmurs, rubs, or gallops  ABDOMEN: Soft, nondistended, no masses, guarding, tenderness or rebound, bowel sounds present  EXTREMITIES:  2+ Peripheral Pulses, No clubbing, cyanosis, or edema. No arthritis of shoulders, elbows, hands, hips, knees, ankles, or feet. No DJD C spine, T spine, or L/S spine  LYMPH: No lymphadenopathy noted  SKIN: No rashes or lesions  NERVOUS SYSTEM: lethargic and confused    LABS:        CBC Full  -  ( 2019 07:03 )  WBC Count : 6.11 K/uL  RBC Count : 3.10 M/uL  Hemoglobin : 9.3 g/dL  Hematocrit : 28.5 %  Platelet Count - Automated : 155 K/uL  Mean Cell Volume : 91.9 fl  Mean Cell Hemoglobin : 30.0 pg  Mean Cell Hemoglobin Concentration : 32.6 gm/dL  Auto Neutrophil # : x  Auto Lymphocyte # : x  Auto Monocyte # : x  Auto Eosinophil # : x  Auto Basophil # : x  Auto Neutrophil % : x  Auto Lymphocyte % : x  Auto Monocyte % : x  Auto Eosinophil % : x  Auto Basophil % : x    -    142  |  106  |  18  ----------------------------<  100<H>  4.1   |  23  |  1.43<H>    Ca    9.1      2019 06:46          Urinalysis Basic - ( 2019 16:36 )    Color: Colorless / Appearance: Clear / S.009 / pH: x  Gluc: x / Ketone: Negative  / Bili: Negative / Urobili: Negative   Blood: x / Protein: Negative / Nitrite: Negative   Leuk Esterase: Moderate / RBC: 2 /hpf / WBC 13 /HPF   Sq Epi: x / Non Sq Epi: 0 /hpf / Bacteria: Negative      < from: MR Head No Cont (11.27.19 @ 10:38) >  PROCEDURE DATE:  2019            INTERPRETATION:  Non-contrast MRI of the brain.    CLINICAL INDICATION: Dementia with altered mental status     TECHNIQUE:  Multiplanar, multisequence MR images of the brain were   obtained without the administration of intravenous contrast.      COMPARISON: Brain CT dated 2019    FINDINGS:No acute hemorrhage or infarct is identified. Age-appropriate   involutional changes are present. A moderate microvascular ischemic   changes are present. Chronic infarcts are noted in the left frontal lobe    No hydrocephalus, midline shift or extra-axial collections are   identified. A    Flow voids at the skull base are within normal limits.    The orbits are not remarkable in appearance.    The visualized paranasal sinuses and tympanomastoid cavities are free of   acute disease.    Impression:    No acute hemorrhage or infarct. Age-appropriate involutional changes,   moderate microvascular ischemic change and chronic infarcts., bilateral   basal ganglia and right peripheral cerebellar hemisphere.

## 2019-11-27 NOTE — PROGRESS NOTE ADULT - ASSESSMENT
89 yo male recently admitted to [facility] for hematuria presents with a change in mental status. Patient found to be very lethargic and unable to ambulate. In ED Patient was found to have a + UA with suggested Urinary tract infection. Patient very lethargic yesterday; alert and responsive today.

## 2019-11-28 RX ADMIN — RIVASTIGMINE 1 PATCH: 4.6 PATCH, EXTENDED RELEASE TRANSDERMAL at 07:29

## 2019-11-28 RX ADMIN — FINASTERIDE 5 MILLIGRAM(S): 5 TABLET, FILM COATED ORAL at 19:05

## 2019-11-28 RX ADMIN — RIVASTIGMINE 1 PATCH: 4.6 PATCH, EXTENDED RELEASE TRANSDERMAL at 18:00

## 2019-11-28 RX ADMIN — ATORVASTATIN CALCIUM 10 MILLIGRAM(S): 80 TABLET, FILM COATED ORAL at 22:22

## 2019-11-28 RX ADMIN — TAMSULOSIN HYDROCHLORIDE 0.4 MILLIGRAM(S): 0.4 CAPSULE ORAL at 22:22

## 2019-11-28 RX ADMIN — AMLODIPINE BESYLATE 10 MILLIGRAM(S): 2.5 TABLET ORAL at 05:31

## 2019-11-28 RX ADMIN — RIVASTIGMINE 1 PATCH: 4.6 PATCH, EXTENDED RELEASE TRANSDERMAL at 19:05

## 2019-11-28 NOTE — PROGRESS NOTE ADULT - ATTENDING COMMENTS
No arrythmias on the monitor  continue physical therapy as tolerated   encourage PO  DC planning to rehab

## 2019-11-28 NOTE — PROGRESS NOTE ADULT - SUBJECTIVE AND OBJECTIVE BOX
Patient is a 89y old  Male who presents with a chief complaint of change in mental status  Hx of CAD s/p PCI x2, TIA on plavix, Atrial Fibrillation on no AC, HLD, HTN, Dementia, multiple UTI presents with son (who left before triage) with report of AMS. Per triage note, patient currently being treated with antibiotics for UTI. Patient reportedly more confused from baseline, no longer ambulating, weaker. No other review of systems known at this time, patient unable to provide further hx. Meds, allergies per charting, otherwise unable to obtain from patient. Patient last admitted 9/13-9/20 due to hematuria, UTI. Patient found to have a positive UA in ED.   Patient continue to improve with abx.  Patient today more lethargic again c/w yesterday  Daughter at bedside  Sleepy but arousable  then goes right back to sleep.  Patient starting to get stronger,  his BP elevated  - will start patient on amlodipine 10 mg po qd  for BP control.  Patient seen today with increased lethargy. Difficult to arouse. No new or sedating meds given. Today he is more alert and responsive. Patient seen sitting up in chair. awake and alert      MEDICATIONS  (STANDING):  amLODIPine   Tablet 10 milliGRAM(s) Oral daily  atorvastatin 10 milliGRAM(s) Oral at bedtime  dextrose 5% + sodium chloride 0.45%. 1000 milliLiter(s) (50 mL/Hr) IV Continuous <Continuous>  finasteride 5 milliGRAM(s) Oral daily  rivastigmine patch  9.5 mG/24 Hr(s) 1 Patch Transdermal every 24 hours  senna 2 Tablet(s) Oral at bedtime  tamsulosin 0.4 milliGRAM(s) Oral at bedtime    MEDICATIONS  (PRN):  acetaminophen    Suspension .. 650 milliGRAM(s) Oral every 4 hours PRN Temp greater or equal to 38C (100.4F), Mild Pain (1 - 3)  polyethylene glycol 3350 17 Gram(s) Oral at bedtime PRN Constipation          VITALS:   T(C): 36.6 (11-28-19 @ 21:09), Max: 36.6 (11-28-19 @ 21:09)  HR: 74 (11-28-19 @ 21:09) (49 - 74)  BP: 131/69 (11-28-19 @ 21:09) (106/66 - 131/69)  RR: 18 (11-28-19 @ 21:09) (18 - 18)  SpO2: 97% (11-28-19 @ 21:09) (96% - 97%)  Wt(kg): --      PHYSICAL EXAM:  GENERAL: NAD, well nourished and conversant  HEAD:  Atraumatic  EYES: EOM, PERRLA, conjunctiva pink and sclera white  ENT: No tonsillar erythema, exudates, or enlargement, moist mucous membranes, good dentition, no lesions  NECK: Supple, No JVD, normal thyroid, carotids with normal upstrokes and no bruits  CHEST/LUNG: Clear to auscultation bilaterally, No rales, rhonchi, wheezing, or rubs  HEART: Regular rate and rhythm, No murmurs, rubs, or gallops  ABDOMEN: Soft, nondistended, no masses, guarding, tenderness or rebound, bowel sounds present  EXTREMITIES:  2+ Peripheral Pulses, No clubbing, cyanosis, or edema. No arthritis of shoulders, elbows, hands, hips, knees, ankles, or feet. No DJD C spine, T spine, or L/S spine  LYMPH: No lymphadenopathy noted  SKIN: No rashes or lesions  NERVOUS SYSTEM: lethargic and confused  LABS:          11-27    142  |  106  |  18  ----------------------------<  100<H>  4.1   |  23  |  1.43<H>    Ca    9.1      27 Nov 2019 06:46            CAPILLARY BLOOD GLUCOSE          RADIOLOGY & ADDITIONAL TESTS:

## 2019-11-28 NOTE — PROGRESS NOTE ADULT - PROBLEM SELECTOR PLAN 1
continue zosyn  will eventually change to PO abd prior to DC  follow urine output with a hx of BPH  will continue flomax and finesteride

## 2019-11-28 NOTE — PROGRESS NOTE ADULT - ASSESSMENT
89 yo male recently admitted to [facility] for hematuria presents with a change in mental status. Patient found to be very lethargic and unable to ambulate. In ED Patient was found to have a + UA with suggested Urinary tract infection. Patient very lethargic yesterday.  alert and responsive today.

## 2019-11-29 LAB
ANION GAP SERPL CALC-SCNC: 12 MMOL/L — SIGNIFICANT CHANGE UP (ref 5–17)
BUN SERPL-MCNC: 18 MG/DL — SIGNIFICANT CHANGE UP (ref 7–23)
CALCIUM SERPL-MCNC: 9 MG/DL — SIGNIFICANT CHANGE UP (ref 8.4–10.5)
CHLORIDE SERPL-SCNC: 107 MMOL/L — SIGNIFICANT CHANGE UP (ref 96–108)
CO2 SERPL-SCNC: 23 MMOL/L — SIGNIFICANT CHANGE UP (ref 22–31)
CREAT SERPL-MCNC: 1.1 MG/DL — SIGNIFICANT CHANGE UP (ref 0.5–1.3)
GLUCOSE BLDC GLUCOMTR-MCNC: 96 MG/DL — SIGNIFICANT CHANGE UP (ref 70–99)
GLUCOSE SERPL-MCNC: 99 MG/DL — SIGNIFICANT CHANGE UP (ref 70–99)
POTASSIUM SERPL-MCNC: 3.9 MMOL/L — SIGNIFICANT CHANGE UP (ref 3.5–5.3)
POTASSIUM SERPL-SCNC: 3.9 MMOL/L — SIGNIFICANT CHANGE UP (ref 3.5–5.3)
SODIUM SERPL-SCNC: 142 MMOL/L — SIGNIFICANT CHANGE UP (ref 135–145)

## 2019-11-29 RX ADMIN — AMLODIPINE BESYLATE 10 MILLIGRAM(S): 2.5 TABLET ORAL at 05:48

## 2019-11-29 RX ADMIN — SENNA PLUS 2 TABLET(S): 8.6 TABLET ORAL at 20:54

## 2019-11-29 RX ADMIN — FINASTERIDE 5 MILLIGRAM(S): 5 TABLET, FILM COATED ORAL at 10:25

## 2019-11-29 RX ADMIN — RIVASTIGMINE 1 PATCH: 4.6 PATCH, EXTENDED RELEASE TRANSDERMAL at 17:36

## 2019-11-29 RX ADMIN — TAMSULOSIN HYDROCHLORIDE 0.4 MILLIGRAM(S): 0.4 CAPSULE ORAL at 20:54

## 2019-11-29 RX ADMIN — ATORVASTATIN CALCIUM 10 MILLIGRAM(S): 80 TABLET, FILM COATED ORAL at 20:54

## 2019-11-29 RX ADMIN — SODIUM CHLORIDE 50 MILLILITER(S): 9 INJECTION, SOLUTION INTRAVENOUS at 05:47

## 2019-11-29 RX ADMIN — RIVASTIGMINE 1 PATCH: 4.6 PATCH, EXTENDED RELEASE TRANSDERMAL at 08:24

## 2019-11-29 NOTE — DIETITIAN INITIAL EVALUATION ADULT. - PERTINENT MEDS FT
acetaminophen    Suspension .. 650 PRN  amLODIPine   Tablet 10  atorvastatin 10  dextrose 5% + sodium chloride 0.45%. 1000  finasteride 5  polyethylene glycol 3350 17 PRN  rivastigmine patch  9.5 mG/24 Hr(s) 1  senna 2  tamsulosin 0.4

## 2019-11-29 NOTE — DIETITIAN INITIAL EVALUATION ADULT. - ADD RECOMMEND
recommend speech/swallow eval for possible diet advance. continue mechanical soft, dash diet for now. provide Danactive when available.

## 2019-11-29 NOTE — PROGRESS NOTE ADULT - SUBJECTIVE AND OBJECTIVE BOX
Patient is a 89y old  Male who presents with a chief complaint of change in mental status  Hx of CAD s/p PCI x2, TIA on plavix, Atrial Fibrillation on no AC, HLD, HTN, Dementia, multiple UTI presents with son (who left before triage) with report of AMS. Per triage note, patient currently being treated with antibiotics for UTI. Patient reportedly more confused from baseline, no longer ambulating, weaker. No other review of systems known at this time, patient unable to provide further hx. Meds, allergies per charting, otherwise unable to obtain from patient. Patient last admitted 9/13-9/20 due to hematuria, UTI. Patient found to have a positive UA in ED.   Patient continue to improve with abx.  Patient today more lethargic again c/w yesterday  Daughter at bedside  Sleepy but arousable  then goes right back to sleep.  Patient starting to get stronger,  his BP elevated  - will start patient on amlodipine 10 mg po qd  for BP control.  Patient seen today with increased lethargy. Difficult to arouse. No new or sedating meds given. Today he is more alert and responsive. Patient seen sitting up in chair. awake and alert.  Patient with waxing and waning mental status  Thuis am very alert no barely arousble        MEDICATIONS  (STANDING):  amLODIPine   Tablet 10 milliGRAM(s) Oral daily  atorvastatin 10 milliGRAM(s) Oral at bedtime  dextrose 5% + sodium chloride 0.45%. 1000 milliLiter(s) (50 mL/Hr) IV Continuous <Continuous>  finasteride 5 milliGRAM(s) Oral daily  rivastigmine patch  9.5 mG/24 Hr(s) 1 Patch Transdermal every 24 hours  senna 2 Tablet(s) Oral at bedtime  tamsulosin 0.4 milliGRAM(s) Oral at bedtime    MEDICATIONS  (PRN):  acetaminophen    Suspension .. 650 milliGRAM(s) Oral every 4 hours PRN Temp greater or equal to 38C (100.4F), Mild Pain (1 - 3)  polyethylene glycol 3350 17 Gram(s) Oral at bedtime PRN Constipation          VITALS:   T(C): 36.6 (11-28-19 @ 21:09), Max: 36.6 (11-28-19 @ 21:09)  HR: 74 (11-28-19 @ 21:09) (49 - 74)  BP: 131/69 (11-28-19 @ 21:09) (106/66 - 131/69)  RR: 18 (11-28-19 @ 21:09) (18 - 18)  SpO2: 97% (11-28-19 @ 21:09) (96% - 97%)  Wt(kg): --      PHYSICAL EXAM:  GENERAL: NAD, well nourished and conversant  HEAD:  Atraumatic  EYES: EOM, PERRLA, conjunctiva pink and sclera white  ENT: No tonsillar erythema, exudates, or enlargement, moist mucous membranes, good dentition, no lesions  NECK: Supple, No JVD, normal thyroid, carotids with normal upstrokes and no bruits  CHEST/LUNG: Clear to auscultation bilaterally, No rales, rhonchi, wheezing, or rubs  HEART: Regular rate and rhythm, No murmurs, rubs, or gallops  ABDOMEN: Soft, nondistended, no masses, guarding, tenderness or rebound, bowel sounds present  EXTREMITIES:  2+ Peripheral Pulses, No clubbing, cyanosis, or edema. No arthritis of shoulders, elbows, hands, hips, knees, ankles, or feet. No DJD C spine, T spine, or L/S spine  LYMPH: No lymphadenopathy noted  SKIN: No rashes or lesions  NERVOUS SYSTEM: lethargic and confused  LABS:          11-27    142  |  106  |  18  ----------------------------<  100<H>  4.1   |  23  |  1.43<H>    Ca    9.1      27 Nov 2019 06:46            CAPILLARY BLOOD GLUCOSE          RADIOLOGY & ADDITIONAL TESTS:

## 2019-11-29 NOTE — DIETITIAN INITIAL EVALUATION ADULT. - PROBLEM SELECTOR PLAN 3
HPI     Presenting Complaint: Pt her toady for yearly yearly eye exam . DLE: 1   year Pt states over the last year near and distance vision has got blurry   with current contacts.     Ophthalmic medication / drops:Katya    (-) headaches  (-) diplopia   (-) flashes / (-) floaters    CL Brand: Daily contact lens wear, Acuvue Piedad, Pt states contact lens get   dry and uncomfortable.  -9.50 DS OU  Sleeps in lenses: Very rarely  Changes lenses: Every month  Solution: Multipurpose and Peroxide       Last edited by Munir Aldana, OD on 1/30/2019  9:12 AM. (History)            Assessment /Plan     For exam results, see Encounter Report.    Examination of eyes and vision    Refractive error      Good ocular health OU. High myopia, discussed signs/symptoms of RD. RTC immediately if any flashes, floaters, decreased vision, veiling of vision, or trauma occurs.    Dispensed updated spectacle Rx. Discussed various spectacle lens options. Discussed adaptation period to new specs.     Discussed CLs options, pt prefers to stay in acuvue piedad. Dispensed updated CLs Rx: Acuvue piedad. Discussed proper wear and care of lenses. DW only, dispose of monthly. Do not sleep/swim/shower in lenses. Discontinue CL wear ASAP and RTC if any redness or discomfort occurs.         RTC in 1 year for comprehensive eye exam, or sooner prn.                       will continue exelon patch  reorient Patient as needed

## 2019-11-29 NOTE — DIETITIAN INITIAL EVALUATION ADULT. - OTHER INFO
Reason for admission : change in mental status  Diet PTA : pt with dementia, he report living with a lot of seniors and his boys prepare his meals. pt profile notes he arrived from home  Intake : >75%  Denies nausea/vomit :  Denies difficulty chewing /swallow : pt receiving Mechanical soft diet, he denies the need for soft texture foods and that he can tolerated regular texture foods, rec speech evaluate for appropriate for possible upgrade.   Last BM : 11/28  NKFA  IBW +/- 10%=  166pounds  Ht:70"  Usual Weight PTA: weight has been increasing since 8/24/2014 from 167.3pounds to current weight of 198.9pounds   BMI: 28.6  Education Provided : N/A  skin: no pressure injury  edema: +1 right ankle, right foot, left ankle, left foot

## 2019-11-29 NOTE — PROGRESS NOTE ADULT - PROBLEM SELECTOR PLAN 3
will continue exelon patch  reorient Patient as needed  MS slightly better today will continue exelon patch  reorient Patient as needed  MS slightly better today now minimally responsive  Plan as above

## 2019-11-29 NOTE — PROGRESS NOTE ADULT - ATTENDING COMMENTS
No arrhythmias on the monitor  continue physical therapy as tolerated   encourage PO  r/o seizure eekvated /co2 activity when unarousable   DC planning to rehab No arrhythmias on the monitor  continue physical therapy as tolerated   encourage PO  r/o seizure elevated co2 activity when unarousable   DC planning to rehab

## 2019-11-29 NOTE — PROGRESS NOTE ADULT - ASSESSMENT
89 yo male recently admitted to [facility] for hematuria presents with a change in mental status. Patient found to be very lethargic and unable to ambulate. In ED Patient was found to have a + UA with suggested Urinary tract infection. Patient very lethargic yesterday.  Alert and responsive today. Patient with waxing and waning mental status  --  very alert earlier today christopher at 2 pm minimally responsive .  Will get ammonia level, abg and EEG to r/o absence seizure  with unarousability 89 yo male recently admitted to [facility] for hematuria presents with a change in mental status. Patient found to be very lethargic and unable to ambulate. In ED Patient was found to have a + UA with suggested Urinary tract infection. Patient very lethargic yesterday.  Alert and responsive today. Patient with waxing and waning mental status  --  very alert earlier today now at 2 pm minimally responsive .  Will get ammonia level, abg and EEG to r/o absence seizure  with unarousability .

## 2019-11-30 LAB
AMMONIA BLD-MCNC: 55 UMOL/L — SIGNIFICANT CHANGE UP (ref 11–55)
ANION GAP SERPL CALC-SCNC: 13 MMOL/L — SIGNIFICANT CHANGE UP (ref 5–17)
APPEARANCE UR: ABNORMAL
BASE EXCESS BLDA CALC-SCNC: 1.4 MMOL/L — SIGNIFICANT CHANGE UP (ref -2–2)
BASE EXCESS BLDV CALC-SCNC: -0.6 MMOL/L — SIGNIFICANT CHANGE UP (ref -2–2)
BILIRUB UR-MCNC: NEGATIVE — SIGNIFICANT CHANGE UP
BUN SERPL-MCNC: 18 MG/DL — SIGNIFICANT CHANGE UP (ref 7–23)
CALCIUM SERPL-MCNC: 9.5 MG/DL — SIGNIFICANT CHANGE UP (ref 8.4–10.5)
CHLORIDE SERPL-SCNC: 104 MMOL/L — SIGNIFICANT CHANGE UP (ref 96–108)
CO2 BLDA-SCNC: 26 MMOL/L — SIGNIFICANT CHANGE UP (ref 22–30)
CO2 BLDV-SCNC: 26 MMOL/L — SIGNIFICANT CHANGE UP (ref 22–30)
CO2 SERPL-SCNC: 22 MMOL/L — SIGNIFICANT CHANGE UP (ref 22–31)
COLOR SPEC: SIGNIFICANT CHANGE UP
CREAT SERPL-MCNC: 1.12 MG/DL — SIGNIFICANT CHANGE UP (ref 0.5–1.3)
CULTURE RESULTS: SIGNIFICANT CHANGE UP
DIFF PNL FLD: ABNORMAL
GAS PNL BLDA: SIGNIFICANT CHANGE UP
GAS PNL BLDV: SIGNIFICANT CHANGE UP
GLUCOSE BLDC GLUCOMTR-MCNC: 83 MG/DL — SIGNIFICANT CHANGE UP (ref 70–99)
GLUCOSE BLDC GLUCOMTR-MCNC: 84 MG/DL — SIGNIFICANT CHANGE UP (ref 70–99)
GLUCOSE BLDC GLUCOMTR-MCNC: 88 MG/DL — SIGNIFICANT CHANGE UP (ref 70–99)
GLUCOSE SERPL-MCNC: 78 MG/DL — SIGNIFICANT CHANGE UP (ref 70–99)
GLUCOSE UR QL: NEGATIVE — SIGNIFICANT CHANGE UP
HCO3 BLDA-SCNC: 25 MMOL/L — SIGNIFICANT CHANGE UP (ref 21–29)
HCO3 BLDV-SCNC: 25 MMOL/L — SIGNIFICANT CHANGE UP (ref 21–29)
HCT VFR BLD CALC: 32.4 % — LOW (ref 39–50)
HGB BLD-MCNC: 10.5 G/DL — LOW (ref 13–17)
HOROWITZ INDEX BLDA+IHG-RTO: 21 — SIGNIFICANT CHANGE UP
KETONES UR-MCNC: NEGATIVE — SIGNIFICANT CHANGE UP
LACTATE BLDV-MCNC: 2 MMOL/L — SIGNIFICANT CHANGE UP (ref 0.7–2)
LEUKOCYTE ESTERASE UR-ACNC: ABNORMAL
MAGNESIUM SERPL-MCNC: 1.8 MG/DL — SIGNIFICANT CHANGE UP (ref 1.6–2.6)
MCHC RBC-ENTMCNC: 29.4 PG — SIGNIFICANT CHANGE UP (ref 27–34)
MCHC RBC-ENTMCNC: 32.4 GM/DL — SIGNIFICANT CHANGE UP (ref 32–36)
MCV RBC AUTO: 90.8 FL — SIGNIFICANT CHANGE UP (ref 80–100)
NITRITE UR-MCNC: POSITIVE
NRBC # BLD: 0 /100 WBCS — SIGNIFICANT CHANGE UP (ref 0–0)
PCO2 BLDA: 36 MMHG — SIGNIFICANT CHANGE UP (ref 32–46)
PCO2 BLDV: 48 MMHG — SIGNIFICANT CHANGE UP (ref 35–50)
PH BLDA: 7.46 — HIGH (ref 7.35–7.45)
PH BLDV: 7.34 — LOW (ref 7.35–7.45)
PH UR: 6.5 — SIGNIFICANT CHANGE UP (ref 5–8)
PLATELET # BLD AUTO: 182 K/UL — SIGNIFICANT CHANGE UP (ref 150–400)
PO2 BLDA: 80 MMHG — SIGNIFICANT CHANGE UP (ref 74–108)
PO2 BLDV: 107 MMHG — HIGH (ref 25–45)
POTASSIUM SERPL-MCNC: 4 MMOL/L — SIGNIFICANT CHANGE UP (ref 3.5–5.3)
POTASSIUM SERPL-SCNC: 4 MMOL/L — SIGNIFICANT CHANGE UP (ref 3.5–5.3)
PROCALCITONIN SERPL-MCNC: 0.04 NG/ML — SIGNIFICANT CHANGE UP (ref 0.02–0.1)
PROT UR-MCNC: ABNORMAL
RAPID RVP RESULT: SIGNIFICANT CHANGE UP
RBC # BLD: 3.57 M/UL — LOW (ref 4.2–5.8)
RBC # FLD: 14.8 % — HIGH (ref 10.3–14.5)
SAO2 % BLDA: 98 % — HIGH (ref 92–96)
SAO2 % BLDV: 98 % — HIGH (ref 67–88)
SODIUM SERPL-SCNC: 139 MMOL/L — SIGNIFICANT CHANGE UP (ref 135–145)
SP GR SPEC: 1.02 — SIGNIFICANT CHANGE UP (ref 1.01–1.02)
SPECIMEN SOURCE: SIGNIFICANT CHANGE UP
UROBILINOGEN FLD QL: NEGATIVE — SIGNIFICANT CHANGE UP
WBC # BLD: 4.52 K/UL — SIGNIFICANT CHANGE UP (ref 3.8–10.5)
WBC # FLD AUTO: 4.52 K/UL — SIGNIFICANT CHANGE UP (ref 3.8–10.5)

## 2019-11-30 PROCEDURE — 70450 CT HEAD/BRAIN W/O DYE: CPT | Mod: 26

## 2019-11-30 PROCEDURE — 71045 X-RAY EXAM CHEST 1 VIEW: CPT | Mod: 26

## 2019-11-30 RX ORDER — PIPERACILLIN AND TAZOBACTAM 4; .5 G/20ML; G/20ML
3.38 INJECTION, POWDER, LYOPHILIZED, FOR SOLUTION INTRAVENOUS ONCE
Refills: 0 | Status: COMPLETED | OUTPATIENT
Start: 2019-11-30 | End: 2019-11-30

## 2019-11-30 RX ORDER — VANCOMYCIN HCL 1 G
1000 VIAL (EA) INTRAVENOUS ONCE
Refills: 0 | Status: COMPLETED | OUTPATIENT
Start: 2019-11-30 | End: 2019-11-30

## 2019-11-30 RX ORDER — SODIUM CHLORIDE 9 MG/ML
1000 INJECTION, SOLUTION INTRAVENOUS
Refills: 0 | Status: DISCONTINUED | OUTPATIENT
Start: 2019-11-30 | End: 2019-12-01

## 2019-11-30 RX ADMIN — RIVASTIGMINE 1 PATCH: 4.6 PATCH, EXTENDED RELEASE TRANSDERMAL at 18:39

## 2019-11-30 RX ADMIN — Medication 250 MILLIGRAM(S): at 08:09

## 2019-11-30 RX ADMIN — SODIUM CHLORIDE 50 MILLILITER(S): 9 INJECTION, SOLUTION INTRAVENOUS at 13:40

## 2019-11-30 RX ADMIN — RIVASTIGMINE 1 PATCH: 4.6 PATCH, EXTENDED RELEASE TRANSDERMAL at 20:39

## 2019-11-30 RX ADMIN — SODIUM CHLORIDE 75 MILLILITER(S): 9 INJECTION, SOLUTION INTRAVENOUS at 21:25

## 2019-11-30 RX ADMIN — RIVASTIGMINE 1 PATCH: 4.6 PATCH, EXTENDED RELEASE TRANSDERMAL at 01:28

## 2019-11-30 RX ADMIN — FINASTERIDE 5 MILLIGRAM(S): 5 TABLET, FILM COATED ORAL at 20:57

## 2019-11-30 RX ADMIN — ATORVASTATIN CALCIUM 10 MILLIGRAM(S): 80 TABLET, FILM COATED ORAL at 21:25

## 2019-11-30 RX ADMIN — RIVASTIGMINE 1 PATCH: 4.6 PATCH, EXTENDED RELEASE TRANSDERMAL at 09:03

## 2019-11-30 RX ADMIN — PIPERACILLIN AND TAZOBACTAM 200 GRAM(S): 4; .5 INJECTION, POWDER, LYOPHILIZED, FOR SOLUTION INTRAVENOUS at 07:35

## 2019-11-30 RX ADMIN — TAMSULOSIN HYDROCHLORIDE 0.4 MILLIGRAM(S): 0.4 CAPSULE ORAL at 21:25

## 2019-11-30 RX ADMIN — AMLODIPINE BESYLATE 10 MILLIGRAM(S): 2.5 TABLET ORAL at 20:57

## 2019-11-30 NOTE — PROGRESS NOTE ADULT - PROBLEM SELECTOR PLAN 3
will continue exelon patch  reorient Patient as needed  MS slightly better today now minimally responsive  Plan as above

## 2019-11-30 NOTE — PROVIDER CONTACT NOTE (OTHER) - ASSESSMENT
Unable to obtain patient's body temperature after multiple attempts,rectal temp -94.7F.Patient lethargic appears to be in a deep sleep?Patient responsive to questions after 15 min,awake opens eyes amswering questions.Mental status waxes and wanes.Vitals BP-128/76,heart rate 60/mt,O 2 sat 95% at room air,+ congested cough.Aspiration precautions maintained.Last po intake -dinner fed by son.

## 2019-11-30 NOTE — PROGRESS NOTE ADULT - SUBJECTIVE AND OBJECTIVE BOX
Patient is a 89y old  Male who presents with a chief complaint of change in mental status  Hx of CAD s/p PCI x2, TIA on plavix, Atrial Fibrillation on no AC, HLD, HTN, Dementia, multiple UTI presents with son (who left before triage) with report of AMS. Per triage note, patient currently being treated with antibiotics for UTI. Patient reportedly more confused from baseline, no longer ambulating, weaker. No other review of systems known at this time, patient unable to provide further hx. Meds, allergies per charting, otherwise unable to obtain from patient. Patient last admitted - due to hematuria, UTI. Patient found to have a positive UA in ED.   Patient continue to improve with abx.  Patient today more lethargic again c/w yesterday  Daughter at bedside  Sleepy but arousable  then goes right back to sleep.  Patient starting to get stronger,  his BP elevated  - will start patient on amlodipine 10 mg po qd  for BP control.  Patient seen today with increased lethargy. Difficult to arouse. No new or sedating meds given. Today he is more alert and responsive. Patient seen sitting up in chair. awake and alert.  Patient with waxing and waning mental status  Thuis am very alert no barely arousble        MEDICATIONS  (STANDING):  amLODIPine   Tablet 10 milliGRAM(s) Oral daily  atorvastatin 10 milliGRAM(s) Oral at bedtime  dextrose 5% + sodium chloride 0.45%. 1000 milliLiter(s) (50 mL/Hr) IV Continuous <Continuous>  finasteride 5 milliGRAM(s) Oral daily  rivastigmine patch  9.5 mG/24 Hr(s) 1 Patch Transdermal every 24 hours  senna 2 Tablet(s) Oral at bedtime  tamsulosin 0.4 milliGRAM(s) Oral at bedtime    MEDICATIONS  (PRN):  acetaminophen    Suspension .. 650 milliGRAM(s) Oral every 4 hours PRN Temp greater or equal to 38C (100.4F), Mild Pain (1 - 3)  polyethylene glycol 3350 17 Gram(s) Oral at bedtime PRN Constipation    Vital Signs Last 24 Hrs  T(C): 36.4 (2019 11:42), Max: 36.5 (2019 09:24)  T(F): 97.6 (2019 11:42), Max: 97.7 (2019 09:24)  HR: 70 (2019 11:42) (54 - 70)  BP: 142/77 (2019 11:42) (116/65 - 160/82)  BP(mean): --  RR: 18 (2019 11:42) (18 - 18)  SpO2: 96% (2019 11:42) (95% - 97%)    PHYSICAL EXAM:  GENERAL: NAD, well nourished and conversant  HEAD:  Atraumatic  EYES: EOM, PERRLA, conjunctiva pink and sclera white  ENT: No tonsillar erythema, exudates, or enlargement, moist mucous membranes, good dentition, no lesions  NECK: Supple, No JVD, normal thyroid, carotids with normal upstrokes and no bruits  CHEST/LUNG: Clear to auscultation bilaterally, No rales, rhonchi, wheezing, or rubs  HEART: Regular rate and rhythm, No murmurs, rubs, or gallops  ABDOMEN: Soft, nondistended, no masses, guarding, tenderness or rebound, bowel sounds present  EXTREMITIES:  2+ Peripheral Pulses, No clubbing, cyanosis, or edema. No arthritis of shoulders, elbows, hands, hips, knees, ankles, or feet. No DJD C spine, T spine, or L/S spine  LYMPH: No lymphadenopathy noted  SKIN: No rashes or lesions  NERVOUS SYSTEM: lethargic and confused    LABS:            CBC Full  -  ( 2019 07:33 )  WBC Count : 4.52 K/uL  RBC Count : 3.57 M/uL  Hemoglobin : 10.5 g/dL  Hematocrit : 32.4 %  Platelet Count - Automated : 182 K/uL  Mean Cell Volume : 90.8 fl  Mean Cell Hemoglobin : 29.4 pg  Mean Cell Hemoglobin Concentration : 32.4 gm/dL  Auto Neutrophil # : x  Auto Lymphocyte # : x  Auto Monocyte # : x  Auto Eosinophil # : x  Auto Basophil # : x  Auto Neutrophil % : x  Auto Lymphocyte % : x  Auto Monocyte % : x  Auto Eosinophil % : x  Auto Basophil % : x    11    139  |  104  |  18  ----------------------------<  78  4.0   |  22  |  1.12    Ca    9.5      2019 07:33  Mg     1.8     11-30          Urinalysis Basic - ( 2019 13:44 )    Color: Light Yellow / Appearance: Slightly Turbid / S.016 / pH: x  Gluc: x / Ketone: Negative  / Bili: Negative / Urobili: Negative   Blood: x / Protein: Trace / Nitrite: Positive   Leuk Esterase: Large / RBC: 75 /hpf /  /HPF   Sq Epi: x / Non Sq Epi: 1 /hpf / Bacteria: Negative Patient is a 89y old  Male who presents with a chief complaint of change in mental status  Hx of CAD s/p PCI x2, TIA on plavix, Atrial Fibrillation on no AC, HLD, HTN, Dementia, multiple UTI presents with son (who left before triage) with report of AMS. Per triage note, patient currently being treated with antibiotics for UTI. Patient reportedly more confused from baseline, no longer ambulating, weaker. No other review of systems known at this time, patient unable to provide further hx. Meds, allergies per charting, otherwise unable to obtain from patient. Patient last admitted - due to hematuria, UTI. Patient found to have a positive UA in ED.   Patient continue to improve with abx.  Patient today more lethargic again c/w yesterday  Daughter at bedside  Sleepy but arousable  then goes right back to sleep.  Patient starting to get stronger,  his BP elevated  - will start patient on amlodipine 10 mg po qd  for BP control.  Patient seen today with increased lethargy. Difficult to arouse. No new or sedating meds given.  Patient with waxing and waning mental status  This am not alert and barely arousble.  The patient was hypothermic last night and required warming blankets. To obtain a.m. and p.m. cortisol levels and begin steroids for hypothalamic disease.  To also obtain T3, T4 and TSH to rule out occult hypothyroidism  as responsible for his comatose behavior    MEDICATIONS  (STANDING):  amLODIPine   Tablet 10 milliGRAM(s) Oral daily  atorvastatin 10 milliGRAM(s) Oral at bedtime  dextrose 5% + sodium chloride 0.45%. 1000 milliLiter(s) (50 mL/Hr) IV Continuous <Continuous>  finasteride 5 milliGRAM(s) Oral daily  rivastigmine patch  9.5 mG/24 Hr(s) 1 Patch Transdermal every 24 hours  senna 2 Tablet(s) Oral at bedtime  tamsulosin 0.4 milliGRAM(s) Oral at bedtime    MEDICATIONS  (PRN):  acetaminophen    Suspension .. 650 milliGRAM(s) Oral every 4 hours PRN Temp greater or equal to 38C (100.4F), Mild Pain (1 - 3)  polyethylene glycol 3350 17 Gram(s) Oral at bedtime PRN Constipation    Vital Signs Last 24 Hrs  T(C): 36.4 (2019 11:42), Max: 36.5 (2019 09:24)  T(F): 97.6 (2019 11:42), Max: 97.7 (2019 09:24)  HR: 70 (2019 11:42) (54 - 70)  BP: 142/77 (2019 11:42) (116/65 - 160/82)  BP(mean): --  RR: 18 (2019 11:42) (18 - 18)  SpO2: 96% (2019 11:42) (95% - 97%)    PHYSICAL EXAM:  GENERAL: NAD, well nourished and conversant  HEAD:  Atraumatic  EYES: EOM, PERRLA, conjunctiva pink and sclera white  ENT: No tonsillar erythema, exudates, or enlargement, moist mucous membranes, good dentition, no lesions  NECK: Supple, No JVD, normal thyroid, carotids with normal upstrokes and no bruits  CHEST/LUNG: Clear to auscultation bilaterally, No rales, rhonchi, wheezing, or rubs  HEART: Regular rate and rhythm, No murmurs, rubs, or gallops  ABDOMEN: Soft, nondistended, no masses, guarding, tenderness or rebound, bowel sounds present  EXTREMITIES:  2+ Peripheral Pulses, No clubbing, cyanosis, or edema. No arthritis of shoulders, elbows, hands, hips, knees, ankles, or feet. No DJD C spine, T spine, or L/S spine  LYMPH: No lymphadenopathy noted  SKIN: No rashes or lesions  NERVOUS SYSTEM: lethargic and confused    LABS:            CBC Full  -  ( 2019 07:33 )  WBC Count : 4.52 K/uL  RBC Count : 3.57 M/uL  Hemoglobin : 10.5 g/dL  Hematocrit : 32.4 %  Platelet Count - Automated : 182 K/uL  Mean Cell Volume : 90.8 fl  Mean Cell Hemoglobin : 29.4 pg  Mean Cell Hemoglobin Concentration : 32.4 gm/dL  Auto Neutrophil # : x  Auto Lymphocyte # : x  Auto Monocyte # : x  Auto Eosinophil # : x  Auto Basophil # : x  Auto Neutrophil % : x  Auto Lymphocyte % : x  Auto Monocyte % : x  Auto Eosinophil % : x  Auto Basophil % : x    11-30    139  |  104  |  18  ----------------------------<  78  4.0   |  22  |  1.12    Ca    9.5      2019 07:33  Mg     1.8     11-30          Urinalysis Basic - ( 2019 13:44 )    Color: Light Yellow / Appearance: Slightly Turbid / S.016 / pH: x  Gluc: x / Ketone: Negative  / Bili: Negative / Urobili: Negative   Blood: x / Protein: Trace / Nitrite: Positive   Leuk Esterase: Large / RBC: 75 /hpf /  /HPF   Sq Epi: x / Non Sq Epi: 1 /hpf / Bacteria: Negative

## 2019-11-30 NOTE — CHART NOTE - NSCHARTNOTEFT_GEN_A_CORE
Pt with increasing lethargy. son at bed side concerned; Feels even though Dad was lethargic , feels he is hard to awaken today      Vital Signs Last 24 Hrs  T(C): 36.8 (30 Nov 2019 18:27), Max: 36.8 (30 Nov 2019 18:27)  T(F): 98.3 (30 Nov 2019 18:27), Max: 98.3 (30 Nov 2019 18:27)  HR: 66 (30 Nov 2019 18:27) (54 - 70)  BP: 144/73 (30 Nov 2019 18:27) (116/65 - 160/82)  BP(mean): --  RR: 17 (30 Nov 2019 18:27) (17 - 18)  SpO2: 97% (30 Nov 2019 18:27) (95% - 97%)      PHYSICAL EXAM:  Constitutional: Lethargic  Eyes: equal and reactive 2 mm  Respiratory: CT Ab/l  Cardiovascular: S1S2 RRR  Gastrointestinal: soft, + BS  Extremities: No edema  Neurological: responds to painful stimuli. intermittently obeys commands    Assessment  89y old  Male who presents with a chief complaint of change in mental status  Hx of CAD s/p PCI x2, TIA on plavix, Atrial Fibrillation on no AC, HLD, HTN, Dementia, multiple UTI presents with son (who left before triage) with report of AMS; Treated for UTI. Initial Neuro work up negative; Now with worsening lethargy  Plan  Repeat CT head  ABG  Follow up cultures  Consider ID eval  Follow up AM cortisol levels  Discussed with attending

## 2019-11-30 NOTE — CHART NOTE - NSCHARTNOTEFT_GEN_A_CORE
Chart/Event Note  [facility] 3DSU 347 D1  CHRISTINE NAJERA, 89y, Male  [id number]    Reason for Notification:   Notified by RN that patient was hypothermic to 94.7 F.     Events/History of Present Illness  Went to bedside with nursing staff to evaluate patient. Patient highly lethargic, will respond to loud verbal or si     Review of Systems:  Constitutional: No fever, chills, or fatigue.  Neurologic: No headache, dizziness, vision/speech changes, numbness, or weakness.  Respiratory: No cough, wheezing, dyspnea, or shortness of breath.  Cardiovascular: No chest pain, pressure, or palpitations.   GI: No abdominal pain, nausea, vomiting, diarrhea, constipation.   : No dysuria, burning, frequency, incontinence, or retention.   Skin: No itching, burning, rashes, or lesions .  Musculoskeletal: No joint pain or swelling.   Psychiatric: No depression, anxiety, or mood swings.    T(C): 34.8 (11-30-19 @ 06:07), Max: 36.3 (11-29-19 @ 20:47)  HR: 60 (11-30-19 @ 04:26) (57 - 60)  BP: 128/76 (11-30-19 @ 04:26) (128/76 - 160/82)  RR: 18 (11-30-19 @ 04:26) (18 - 18)  SpO2: 95% (11-30-19 @ 04:26) (95% - 97%)     Physical Examination:  GENERAL: No acute distress noted during examination. Patient is well-groomed and developed.  NERVOUS SYSTEM:  Alert & oriented X3 with appropriate concentration. Motor strength is 5/5 in both bilateral upper and lower extremities. No focal or lateralizing neurologic deficits noted.   HEAD:  Atraumatic and normocephalic.  EYES: EOMI/PERRLA. Conjunctiva and sclera clear  ENMT: No tonsillar erythema, exudates, lesions, or enlargement. Moist mucous membranes with good dentition.   NECK: Supple with no JVD.   CHEST: Clear to ascultation bilaterally. No rales, rhonchi, wheezing, or rubs heard. Symmetrical/bilateral chest wall rise.   HEART: Regular rate and rhythm with no murmurs, rubs, or gallops.  ABDOMEN: Soft, nontender, and nondistended.   EXTREMITIES:  2+ Peripheral Pulses without clubbing, cyanosis, or edema.  LYMPH: No lymphadenopathy noted.  SKIN: No rashes or lesions seen.     Medical Decision Making/Assessment/Plan:      - Diagnosis:      1)   2)   3)   3) Will endorse the above diagnostics and findings to the day medicine team for review and follow up. Will additionally sign out to day medicine teams to follow with relevant specialties.     Afshin Gomez NP  Department of Medicine   # Chart/Event Note  [facility] 3DSU 347 D1  CHRISTINE NAJERA, 89y, Male  [id number]    Reason for Notification:   Notified by RN that patient was hypothermic to 94.7 F.     Events/History of Present Illness  Went to bedside with nursing staff to evaluate patient. Patient highly lethargic, will respond to loud verbal or significant tactile stimuli. Difficult to assess if this is a decrease from patient's baseline as patient has known waxing/waning mental status. Overall, appears more lethargic than yesterday. Patient no able to respond to commands or converse, but will open eyes and track. Patient unable to cooperate with majority of examination. Patient with new deep, recurrent, non-productive cough. Rhonchi heard on lung sounds. Vitals and SPO2 remain stable on room air.   Patient's chart reviewed. Was normothermic at 11/29 2045. No previous episodes of hypothermia throughout admission. Temperature repeated and confirmed after two separate attempts (rectal). Patient with recent urinary infection status post completion of Zosyn course. No previous positive blood cultures seen in EMR.   RN documentation shows that patient was high aspiration risk. Last feeding was dinner 11/29.     Review of Systems:  Unable to obtain due to patient's mental status.     T(C): 34.8 (11-30-19 @ 06:07), Max: 36.3 (11-29-19 @ 20:47)  HR: 60 (11-30-19 @ 04:26) (57 - 60)  BP: 128/76 (11-30-19 @ 04:26) (128/76 - 160/82)  RR: 18 (11-30-19 @ 04:26) (18 - 18)  SpO2: 95% (11-30-19 @ 04:26) (95% - 97%)     Physical Examination:  GENERAL: Patient is lethargic.   NERVOUS SYSTEM:  Awakes to loud verbal or significant tactile stimuli. Tracks staff members and will speak in short sentences. Unable to follow commands. Will return to sleep after stimuli is withdrawn.   HEAD: Atraumatic and normocephalic.  EYES: EOMI/PERRLA. Conjunctiva and sclera clear  NECK: Supple with no JVD.   CHEST: Significant rhonchi heard bilaterally with new recurrent non-productive cough. Symmetrical/bilateral chest wall rise. Normal SPO2 and respiratory rate.   HEART: Regular rate and rhythm with no murmurs, rubs, or gallops. Normotensive.   ABDOMEN: Soft, nontender, and nondistended.   EXTREMITIES:  2+ Peripheral Pulses without clubbing, cyanosis, or edema.    Medical Decision Making/Assessment/Plan:  89-year-old Male with past medical history of AFib, coronary artery disease, and dementia admitted with lethargy secondary to UTI, completed course of Zosyn, now with new hypothermia and cough.     - Diagnosis: Rule Out Aspiration Pneumonia, Hypothermia.   New hypothermia combined with cough is concerning for the development new infection, specifically pneumonia or aspiration pneumonia. As patient's mentation has waxed/waned significantly across admission, unsure if patient is more lethargic than his baseline. Will pursue infection work up and treat empirically.     1) CBC, BMP, Magnesium, VBG, Lactate, Procalcitonin, RVP, and blood cultures all sent. Will endorse to day medicine team to follow with results.   2) Start warming blanket with goal of raising temperature to 97 F.   3) Urgent chest X-ray to evaluate for pneumonia.   4) Recently completed a course of Zosyn for UTI, no positive blood cultures noted. Will cover for hospital-related infections empirically with Vancomycin/Zosyn. Patient not seen by Infectious Disease team during this admission, but was previous seen by Dr. Kenroy Morales during his admission in 9/2019. Should patient's diagnositcs return positive for   3) Will endorse the above diagnostics and findings to the day medicine team for review and follow up. Will additionally sign out to day medicine teams to follow with relevant specialties.     Afshin Gomez NP  Department of Medicine   # Chart/Event Note  [facility] 3DSU 347 D1  CHRISTINE NAJERA, 89y, Male  [id number]    Reason for Notification:   Notified by RN that patient was hypothermic to 94.7 F.     Events/History of Present Illness  Went to bedside with nursing staff to evaluate patient. Patient highly lethargic, will respond to loud verbal or significant tactile stimuli. Difficult to assess if this is a decrease from patient's baseline as patient has known waxing/waning mental status. Overall, appears more lethargic than yesterday. Patient no able to respond to commands or converse, but will open eyes and track. Patient unable to cooperate with majority of examination. Patient with new deep, recurrent, non-productive cough. Rhonchi heard on lung sounds. Vitals and SPO2 remain stable on room air.   Patient's chart reviewed. Was normothermic at 11/29 2045. No previous episodes of hypothermia throughout admission. Temperature repeated and confirmed after two separate attempts (rectal). Patient with recent urinary infection status post completion of Zosyn course. No previous positive blood cultures seen in EMR.   RN documentation shows that patient was high aspiration risk. Last feeding was dinner 11/29.     Review of Systems:  Unable to obtain due to patient's mental status.     T(C): 34.8 (11-30-19 @ 06:07), Max: 36.3 (11-29-19 @ 20:47)  HR: 60 (11-30-19 @ 04:26) (57 - 60)  BP: 128/76 (11-30-19 @ 04:26) (128/76 - 160/82)  RR: 18 (11-30-19 @ 04:26) (18 - 18)  SpO2: 95% (11-30-19 @ 04:26) (95% - 97%)     Physical Examination:  GENERAL: Patient is lethargic.   NERVOUS SYSTEM:  Awakes to loud verbal or significant tactile stimuli. Tracks staff members and will speak in short sentences. Unable to follow commands. Will return to sleep after stimuli is withdrawn.   HEAD: Atraumatic and normocephalic.  EYES: EOMI/PERRLA. Conjunctiva and sclera clear  NECK: Supple with no JVD.   CHEST: Significant rhonchi heard bilaterally with new recurrent non-productive cough. Symmetrical/bilateral chest wall rise. Normal SPO2 and respiratory rate.   HEART: Regular rate and rhythm with no murmurs, rubs, or gallops. Normotensive.   ABDOMEN: Soft, nontender, and nondistended.   EXTREMITIES:  2+ Peripheral Pulses without clubbing, cyanosis, or edema.    Medical Decision Making/Assessment/Plan:  89-year-old Male with past medical history of AFib, coronary artery disease, and dementia admitted with lethargy secondary to UTI, completed course of Zosyn, now with new hypothermia and cough.     - Diagnosis: Rule Out Aspiration Pneumonia, Hypothermia.   New hypothermia combined with cough is concerning for the development new infection, specifically pneumonia or aspiration pneumonia. As patient's mentation has waxed/waned significantly across admission, unsure if patient is more lethargic than his baseline. Will pursue infection work up and treat empirically.     1) CBC, BMP, Magnesium, VBG, Lactate, Procalcitonin, RVP, and blood cultures all sent. Will endorse to day medicine team to follow with results.   2) Start warming blanket with goal of raising temperature to 97 F.   3) Urgent chest X-ray to evaluate for pneumonia.   4) Recently completed a course of Zosyn for UTI, no positive blood cultures noted. Will cover for hospital-related infections empirically with Vancomycin/Zosyn x1 dose each. Patient not seen by Infectious Disease team during this admission, but was previous seen by Dr. Kenroy Morales during his admission in 9/2019. Should patient's exams return positive for an infection, will reach out to ID.  5) Above discussed with internal medicine attending Dr. Singh  3) Will endorse the above diagnostics and findings to the day medicine team for review and follow up. Will additionally sign out to day medicine teams to follow with relevant specialties.     Afshin Gomez NP  Department of Medicine   # Chart/Event Note  [facility] 3DSU 347 D1  CHRISTINE NAJERA, 89y, Male  [id number]    Reason for Notification:   Notified by RN that patient was hypothermic to 94.7 F.     Events/History of Present Illness  Went to bedside with nursing staff to evaluate patient. Patient highly lethargic, will respond to loud verbal or significant tactile stimuli. Difficult to assess if this is a decrease from patient's baseline as patient has known waxing/waning mental status. Overall, appears more lethargic than yesterday. Patient no able to respond to commands or converse, but will open eyes and track. Patient unable to cooperate with majority of examination. Patient with new deep, recurrent, non-productive cough. Rhonchi heard on lung sounds. Vitals and SPO2 remain stable on room air.   Patient's chart reviewed. Was normothermic at 11/29 2045. No previous episodes of hypothermia throughout admission. Temperature repeated and confirmed after two separate attempts (rectal). Patient with recent urinary infection status post completion of Zosyn course. No previous positive blood cultures seen in EMR.   RN documentation shows that patient was high aspiration risk. Last feeding was dinner 11/29.     Review of Systems:  Unable to obtain due to patient's mental status.     T(C): 34.8 (11-30-19 @ 06:07), Max: 36.3 (11-29-19 @ 20:47)  HR: 60 (11-30-19 @ 04:26) (57 - 60)  BP: 128/76 (11-30-19 @ 04:26) (128/76 - 160/82)  RR: 18 (11-30-19 @ 04:26) (18 - 18)  SpO2: 95% (11-30-19 @ 04:26) (95% - 97%)     Physical Examination:  GENERAL: Patient is lethargic.   NERVOUS SYSTEM:  Awakes to loud verbal or significant tactile stimuli. Tracks staff members and will speak in short sentences. Unable to follow commands. Will return to sleep after stimuli is withdrawn.   HEAD: Atraumatic and normocephalic.  EYES: EOMI/PERRLA. Conjunctiva and sclera clear  NECK: Supple with no JVD.   CHEST: Significant rhonchi heard bilaterally with new recurrent non-productive cough. Symmetrical/bilateral chest wall rise. Normal SPO2 and respiratory rate.   HEART: Regular rate and rhythm with no murmurs, rubs, or gallops. Normotensive.   ABDOMEN: Soft, nontender, and nondistended.   EXTREMITIES:  2+ Peripheral Pulses without clubbing, cyanosis, or edema.    Medical Decision Making/Assessment/Plan:  89-year-old Male with past medical history of AFib, coronary artery disease, and dementia admitted with lethargy secondary to UTI, completed course of Zosyn, now with new hypothermia and cough.     - Diagnosis: Rule Out Aspiration Pneumonia, Hypothermia.   New hypothermia combined with cough is concerning for the development new infection, specifically pneumonia or aspiration pneumonia. As patient's mentation has waxed/waned significantly across admission, unsure if patient is more lethargic than his baseline. Will pursue infection work up and treat empirically.     1) CBC, BMP, Magnesium, VBG, Lactate, Procalcitonin, RVP, and blood cultures all sent. Will endorse to day medicine team to follow with results.   2) Start warming blanket with goal of raising temperature to 97 F.   3) Urgent chest X-ray to evaluate for pneumonia.   4) Recently completed a course of Zosyn for UTI, no positive blood cultures noted. Will cover for hospital-related infections empirically with Vancomycin/Zosyn x1 dose each. Patient not seen by Infectious Disease team during this admission, but was previous seen by Dr. Kenroy Morales during his admission in 9/2019. Should patient's exams return positive for an infection, will reach out to ID.  5) Above discussed with internal medicine attending Dr. Franco who agreed with outlined plan.   3) Will endorse the above diagnostics and findings to the day medicine team for review and follow up. Will additionally sign out to day medicine teams to follow with relevant specialties.     Afshin Gomez NP  Department of Medicine   #[phone]

## 2019-11-30 NOTE — PROGRESS NOTE ADULT - ASSESSMENT
89 yo male recently admitted to [facility] for hematuria presents with a change in mental status. Patient found to be very lethargic and unable to ambulate. In ED Patient was found to have a + UA with suggested Urinary tract infection. Patient very lethargic yesterday.  Alert and responsive today. Patient with waxing and waning mental status  --  very alert earlier today now at 2 pm minimally responsive .  Will get ammonia level, abg and EEG to r/o absence seizure  with unarousability . 89 yo male recently admitted to [facility] for hematuria presents with a change in mental status. Patient found to be very lethargic and unable to ambulate. In ED Patient was found to have a + UA with suggested Urinary tract infection. Patient very lethargic yesterday.  Alert and responsive today. Patient with waxing and waning mental status. Very alert earlier in the past and now  minimally responsive .  Will get ammonia level, abg and EEG to r/o absence seizure  with unarousability. This am not alert and barely arousble.  The patient was hypothermic last night and required warming blankets. To obtain a.m. and p.m. cortisol levels and begin steroids for hypothalamic disease.  To also obtain T3, T4 and TSH to rule out occult hypothyroidism  as responsible for his comatose behavior

## 2019-11-30 NOTE — PROGRESS NOTE ADULT - ATTENDING COMMENTS
No arrhythmias on the monitor  continue physical therapy as tolerated   encourage PO  r/o seizure elevated co2 activity when unarousable   DC planning to rehab This am not alert and barely arousble.  The patient was hypothermic last night and required warming blankets. To obtain a.m. and p.m. cortisol levels and begin steroids for hypothalamic disease.  To also obtain T3, T4 and TSH to rule out occult hypothyroidism,  as responsible for his comatose behavior To D/C planning to rehab with unresponsiveness.

## 2019-12-01 LAB
CORTIS AM PEAK SERPL-MCNC: 17.5 UG/DL — SIGNIFICANT CHANGE UP (ref 6–18.4)
CORTIS F PM SERPL-MCNC: 11.8 UG/DL — HIGH (ref 2.7–10.5)
GLUCOSE BLDC GLUCOMTR-MCNC: 101 MG/DL — HIGH (ref 70–99)
GLUCOSE BLDC GLUCOMTR-MCNC: 103 MG/DL — HIGH (ref 70–99)
GLUCOSE BLDC GLUCOMTR-MCNC: 98 MG/DL — SIGNIFICANT CHANGE UP (ref 70–99)
T3 SERPL-MCNC: 75 NG/DL — LOW (ref 80–200)
T4 FREE SERPL-MCNC: 1.7 NG/DL — SIGNIFICANT CHANGE UP (ref 0.9–1.8)
TSH SERPL-MCNC: 0.91 UIU/ML — SIGNIFICANT CHANGE UP (ref 0.27–4.2)

## 2019-12-01 RX ORDER — SODIUM CHLORIDE 9 MG/ML
1000 INJECTION, SOLUTION INTRAVENOUS
Refills: 0 | Status: DISCONTINUED | OUTPATIENT
Start: 2019-12-01 | End: 2019-12-03

## 2019-12-01 RX ADMIN — SODIUM CHLORIDE 75 MILLILITER(S): 9 INJECTION, SOLUTION INTRAVENOUS at 17:33

## 2019-12-01 RX ADMIN — TAMSULOSIN HYDROCHLORIDE 0.4 MILLIGRAM(S): 0.4 CAPSULE ORAL at 21:18

## 2019-12-01 RX ADMIN — RIVASTIGMINE 1 PATCH: 4.6 PATCH, EXTENDED RELEASE TRANSDERMAL at 07:00

## 2019-12-01 RX ADMIN — AMLODIPINE BESYLATE 10 MILLIGRAM(S): 2.5 TABLET ORAL at 21:19

## 2019-12-01 RX ADMIN — ATORVASTATIN CALCIUM 10 MILLIGRAM(S): 80 TABLET, FILM COATED ORAL at 21:20

## 2019-12-01 RX ADMIN — FINASTERIDE 5 MILLIGRAM(S): 5 TABLET, FILM COATED ORAL at 21:20

## 2019-12-01 RX ADMIN — RIVASTIGMINE 1 PATCH: 4.6 PATCH, EXTENDED RELEASE TRANSDERMAL at 17:00

## 2019-12-01 RX ADMIN — SODIUM CHLORIDE 50 MILLILITER(S): 9 INJECTION, SOLUTION INTRAVENOUS at 21:20

## 2019-12-01 RX ADMIN — RIVASTIGMINE 1 PATCH: 4.6 PATCH, EXTENDED RELEASE TRANSDERMAL at 17:29

## 2019-12-01 RX ADMIN — SENNA PLUS 2 TABLET(S): 8.6 TABLET ORAL at 21:19

## 2019-12-01 RX ADMIN — RIVASTIGMINE 1 PATCH: 4.6 PATCH, EXTENDED RELEASE TRANSDERMAL at 21:21

## 2019-12-01 NOTE — PROGRESS NOTE ADULT - ASSESSMENT
89 yo male recently admitted to [facility] for hematuria presents with a change in mental status. Patient found to be very lethargic and unable to ambulate. In ED Patient was found to have a + UA with suggested Urinary tract infection. Patient very lethargic yesterday.  Alert and responsive today. Patient with waxing and waning mental status. Very alert earlier in the past and now  minimally responsive .  Will get ammonia level, abg and EEG to r/o absence seizure  with unarousability. This am not alert and barely arousble.  The patient was hypothermic last night and required warming blankets. To obtain a.m. and p.m. cortisol levels and begin steroids for hypothalamic disease.  To also obtain T3, T4 and TSH to rule out occult hypothyroidism  as responsible for his comatose behavior 89 yo male recently admitted to [facility] for hematuria presents with a change in mental status. Patient found to be very lethargic and unable to ambulate. In ED Patient was found to have a + UA with suggested Urinary tract infection. Patient very lethargic yesterday.  Alert and responsive today. Patient with waxing and waning mental status. Very alert earlier in the past and now  minimally responsive .  Will get ammonia level, abg and EEG to r/o absence seizure  with unarousability. This am not alert and barely arousable.    The patient was hypothermic last night and required warming blankets. 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

## 2019-12-01 NOTE — PROGRESS NOTE ADULT - SUBJECTIVE AND OBJECTIVE BOX
Patient is a 89y old  Male who presents with a chief complaint of change in mental status  Hx of CAD s/p PCI x2, TIA on plavix, Atrial Fibrillation on no AC, HLD, HTN, Dementia, multiple UTI presents with son (who left before triage) with report of AMS. Per triage note, patient currently being treated with antibiotics for UTI. Patient reportedly more confused from baseline, no longer ambulating, weaker. No other review of systems known at this time, patient unable to provide further hx. Meds, allergies per charting, otherwise unable to obtain from patient. Patient last admitted - due to hematuria, UTI. Patient found to have a positive UA in ED.   Patient continue to improve with abx.  Patient today more lethargic again c/w yesterday  Daughter at bedside  Sleepy but arousable  then goes right back to sleep.  Patient starting to get stronger,  his BP elevated  - will start patient on amlodipine 10 mg po qd  for BP control.  Patient seen today with increased lethargy. Difficult to arouse. No new or sedating meds given.  Patient with waxing and waning mental status  This am not alert and barely arousble.  The patient was hypothermic last night and required warming blankets. To obtain a.m. and p.m. cortisol levels and begin steroids for hypothalamic disease.  To also obtain T3, T4 and TSH to rule out occult hypothyroidism  as responsible for his comatose behavior    MEDICATIONS  (STANDING):  amLODIPine   Tablet 10 milliGRAM(s) Oral daily  atorvastatin 10 milliGRAM(s) Oral at bedtime  dextrose 5% + sodium chloride 0.45%. 1000 milliLiter(s) (75 mL/Hr) IV Continuous <Continuous>  finasteride 5 milliGRAM(s) Oral daily  rivastigmine patch  9.5 mG/24 Hr(s) 1 Patch Transdermal every 24 hours  senna 2 Tablet(s) Oral at bedtime  tamsulosin 0.4 milliGRAM(s) Oral at bedtime    MEDICATIONS  (PRN):  acetaminophen    Suspension .. 650 milliGRAM(s) Oral every 4 hours PRN Temp greater or equal to 38C (100.4F), Mild Pain (1 - 3)  polyethylene glycol 3350 17 Gram(s) Oral at bedtime PRN Constipation    Vital Signs Last 24 Hrs  T(C): 36.5 (01 Dec 2019 11:41), Max: 36.8 (2019 18:27)  T(F): 97.7 (01 Dec 2019 11:41), Max: 98.3 (2019 18:27)  HR: 63 (01 Dec 2019 11:41) (63 - 69)  BP: 110/59 (01 Dec 2019 11:41) (110/59 - 155/89)  BP(mean): --  RR: 18 (01 Dec 2019 11:41) (17 - 18)  SpO2: 97% (01 Dec 2019 11:41) (95% - 97%)    PHYSICAL EXAM:  GENERAL: NAD, well nourished and conversant  HEAD:  Atraumatic  EYES: EOM, PERRLA, conjunctiva pink and sclera white  ENT: No tonsillar erythema, exudates, or enlargement, moist mucous membranes, good dentition, no lesions  NECK: Supple, No JVD, normal thyroid, carotids with normal upstrokes and no bruits  CHEST/LUNG: Clear to auscultation bilaterally, No rales, rhonchi, wheezing, or rubs  HEART: Regular rate and rhythm, No murmurs, rubs, or gallops  ABDOMEN: Soft, nondistended, no masses, guarding, tenderness or rebound, bowel sounds present  EXTREMITIES:  2+ Peripheral Pulses, No clubbing, cyanosis, or edema. No arthritis of shoulders, elbows, hands, hips, knees, ankles, or feet. No DJD C spine, T spine, or L/S spine  LYMPH: No lymphadenopathy noted  SKIN: No rashes or lesions  NERVOUS SYSTEM: lethargic and confused    LABS:    ABG - ( 2019 18:06 )  pH, Arterial: 7.46  pH, Blood: x     /  pCO2: 36    /  pO2: 80    / HCO3: 25    / Base Excess: 1.4   /  SaO2: 98          CBC Full  -  ( 2019 07:33 )  WBC Count : 4.52 K/uL  RBC Count : 3.57 M/uL  Hemoglobin : 10.5 g/dL  Hematocrit : 32.4 %  Platelet Count - Automated : 182 K/uL  Mean Cell Volume : 90.8 fl  Mean Cell Hemoglobin : 29.4 pg  Mean Cell Hemoglobin Concentration : 32.4 gm/dL  Auto Neutrophil # : x  Auto Lymphocyte # : x  Auto Monocyte # : x  Auto Eosinophil # : x  Auto Basophil # : x  Auto Neutrophil % : x  Auto Lymphocyte % : x  Auto Monocyte % : x  Auto Eosinophil % : x  Auto Basophil % : x    11-30    139  |  104  |  18  ----------------------------<  78  4.0   |  22  |  1.12    Ca    9.5      2019 07:33  Mg     1.8               Urinalysis Basic - ( 2019 13:44 )    Color: Light Yellow / Appearance: Slightly Turbid / S.016 / pH: x  Gluc: x / Ketone: Negative  / Bili: Negative / Urobili: Negative   Blood: x / Protein: Trace / Nitrite: Positive   Leuk Esterase: Large / RBC: 75 /hpf /  /HPF   Sq Epi: x / Non Sq Epi: 1 /hpf / Bacteria: Negative Patient is a 89y old  Male who presents with a chief complaint of change in mental status  Hx of CAD s/p PCI x2, TIA on plavix, Atrial Fibrillation on no AC, HLD, HTN, Dementia, multiple UTI presents with son (who left before triage) with report of AMS. Per triage note, patient currently being treated with antibiotics for UTI. Patient reportedly more confused from baseline, no longer ambulating, weaker. No other review of systems known at this time, patient unable to provide further hx. Meds, allergies per charting, otherwise unable to obtain from patient. Patient last admitted - due to hematuria, UTI. Patient found to have a positive UA in ED.   Patient continue to improve with abx.  Patient today more lethargic again c/w yesterday  Daughter at bedside  Sleepy but arousable  then goes right back to sleep.  Patient starting to get stronger,  his BP elevated  - will start patient on amlodipine 10 mg po qd  for BP control.  Patient seen today with increased lethargy. Difficult to arouse. No new or sedating meds given.  Patient with waxing and waning mental status  This am not alert and barely arousble.  The patient was hypothermic last night and required warming blankets.  A.m. and p.m. cortisol levels are normal.   T3, T4 and TSH normal to rule out occult hypothyroidism  as responsible for his comatose behavior. 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 Patient is waking up and eating today. Cognitive  function is absent and agitated    MEDICATIONS  (STANDING):  amLODIPine   Tablet 10 milliGRAM(s) Oral daily  atorvastatin 10 milliGRAM(s) Oral at bedtime  dextrose 5% + sodium chloride 0.45%. 1000 milliLiter(s) (75 mL/Hr) IV Continuous <Continuous>  finasteride 5 milliGRAM(s) Oral daily  rivastigmine patch  9.5 mG/24 Hr(s) 1 Patch Transdermal every 24 hours  senna 2 Tablet(s) Oral at bedtime  tamsulosin 0.4 milliGRAM(s) Oral at bedtime    MEDICATIONS  (PRN):  acetaminophen    Suspension .. 650 milliGRAM(s) Oral every 4 hours PRN Temp greater or equal to 38C (100.4F), Mild Pain (1 - 3)  polyethylene glycol 3350 17 Gram(s) Oral at bedtime PRN Constipation    Vital Signs Last 24 Hrs  T(C): 36.5 (01 Dec 2019 11:41), Max: 36.8 (2019 18:27)  T(F): 97.7 (01 Dec 2019 11:41), Max: 98.3 (2019 18:27)  HR: 63 (01 Dec 2019 11:41) (63 - 69)  BP: 110/59 (01 Dec 2019 11:41) (110/59 - 155/89)  BP(mean): --  RR: 18 (01 Dec 2019 11:41) (17 - 18)  SpO2: 97% (01 Dec 2019 11:41) (95% - 97%)    PHYSICAL EXAM:  GENERAL: NAD, well nourished and conversant  HEAD:  Atraumatic  EYES: EOM, PERRLA, conjunctiva pink and sclera white  ENT: No tonsillar erythema, exudates, or enlargement, moist mucous membranes, good dentition, no lesions  NECK: Supple, No JVD, normal thyroid, carotids with normal upstrokes and no bruits  CHEST/LUNG: Clear to auscultation bilaterally, No rales, rhonchi, wheezing, or rubs  HEART: Regular rate and rhythm, No murmurs, rubs, or gallops  ABDOMEN: Soft, nondistended, no masses, guarding, tenderness or rebound, bowel sounds present  EXTREMITIES:  2+ Peripheral Pulses, No clubbing, cyanosis, or edema. No arthritis of shoulders, elbows, hands, hips, knees, ankles, or feet. No DJD C spine, T spine, or L/S spine  LYMPH: No lymphadenopathy noted  SKIN: No rashes or lesions  NERVOUS SYSTEM: lethargic and confused    LABS:    ABG - ( 2019 18:06 )  pH, Arterial: 7.46  pH, Blood: x     /  pCO2: 36    /  pO2: 80    / HCO3: 25    / Base Excess: 1.4   /  SaO2: 98          CBC Full  -  ( 2019 07:33 )  WBC Count : 4.52 K/uL  RBC Count : 3.57 M/uL  Hemoglobin : 10.5 g/dL  Hematocrit : 32.4 %  Platelet Count - Automated : 182 K/uL  Mean Cell Volume : 90.8 fl  Mean Cell Hemoglobin : 29.4 pg  Mean Cell Hemoglobin Concentration : 32.4 gm/dL  Auto Neutrophil # : x  Auto Lymphocyte # : x  Auto Monocyte # : x  Auto Eosinophil # : x  Auto Basophil # : x  Auto Neutrophil % : x  Auto Lymphocyte % : x  Auto Monocyte % : x  Auto Eosinophil % : x  Auto Basophil % : x    11-30    139  |  104  |  18  ----------------------------<  78  4.0   |  22  |  1.12    Ca    9.5      2019 07:33  Mg     1.8     11-30          Urinalysis Basic - ( 2019 13:44 )    Color: Light Yellow / Appearance: Slightly Turbid / S.016 / pH: x  Gluc: x / Ketone: Negative  / Bili: Negative / Urobili: Negative   Blood: x / Protein: Trace / Nitrite: Positive   Leuk Esterase: Large / RBC: 75 /hpf /  /HPF   Sq Epi: x / Non Sq Epi: 1 /hpf / Bacteria: Negative

## 2019-12-01 NOTE — PROVIDER CONTACT NOTE (OTHER) - ASSESSMENT
Patient sleeping between care. Patient combative, trying to hit staff when providing care. Multiple attempts made to feed patient but he is uncooperative.

## 2019-12-01 NOTE — PROGRESS NOTE ADULT - ATTENDING COMMENTS
This am not alert and barely arousble.  The patient was hypothermic last night and required warming blankets. To obtain a.m. and p.m. cortisol levels and begin steroids for hypothalamic disease.  To also obtain T3, T4 and TSH to rule out occult hypothyroidism,  as responsible for his comatose behavior To D/C planning to rehab with unresponsiveness. This am not alert and barely arousable.  The patient was hypothermic last night and required warming blankets. The patient was hypothermic last night and required warming blankets. 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

## 2019-12-01 NOTE — PROVIDER CONTACT NOTE (OTHER) - ACTION/TREATMENT ORDERED:
NP made aware. MD Franco aware as well.
NP made aware. Urine analysis sent to lab.   FS of 83 @ 12:39  fluids restarted @ 50ml/hr.
RVP,CXR,lABS INCLUDING BLOOD CULTURE X 2, IV ABT as ordered.Hypothermia blanket as ordered.Will continue to monitor.
Continue to monitor. R2 pads at bedside. BMP, CBC and Mg ordered for AM labs. Will continue to monitor and maintain safety.
EKG ordered and done. No changes to plan of care. Continue current plan of care. Continue to monitor.

## 2019-12-02 LAB
-  AMIKACIN: SIGNIFICANT CHANGE UP
-  AMPICILLIN/SULBACTAM: SIGNIFICANT CHANGE UP
-  AMPICILLIN: SIGNIFICANT CHANGE UP
-  AZTREONAM: SIGNIFICANT CHANGE UP
-  CEFAZOLIN: SIGNIFICANT CHANGE UP
-  CEFEPIME: SIGNIFICANT CHANGE UP
-  CEFOXITIN: SIGNIFICANT CHANGE UP
-  CEFTRIAXONE: SIGNIFICANT CHANGE UP
-  CIPROFLOXACIN: SIGNIFICANT CHANGE UP
-  ERTAPENEM: SIGNIFICANT CHANGE UP
-  GENTAMICIN: SIGNIFICANT CHANGE UP
-  LEVOFLOXACIN: SIGNIFICANT CHANGE UP
-  MEROPENEM: SIGNIFICANT CHANGE UP
-  NITROFURANTOIN: SIGNIFICANT CHANGE UP
-  PIPERACILLIN/TAZOBACTAM: SIGNIFICANT CHANGE UP
-  TOBRAMYCIN: SIGNIFICANT CHANGE UP
-  TRIMETHOPRIM/SULFAMETHOXAZOLE: SIGNIFICANT CHANGE UP
ALBUMIN SERPL ELPH-MCNC: 3.4 G/DL — SIGNIFICANT CHANGE UP (ref 3.3–5)
ALP SERPL-CCNC: 75 U/L — SIGNIFICANT CHANGE UP (ref 40–120)
ALT FLD-CCNC: 20 U/L — SIGNIFICANT CHANGE UP (ref 10–45)
ANION GAP SERPL CALC-SCNC: 13 MMOL/L — SIGNIFICANT CHANGE UP (ref 5–17)
AST SERPL-CCNC: 29 U/L — SIGNIFICANT CHANGE UP (ref 10–40)
BILIRUB SERPL-MCNC: 0.3 MG/DL — SIGNIFICANT CHANGE UP (ref 0.2–1.2)
BUN SERPL-MCNC: 16 MG/DL — SIGNIFICANT CHANGE UP (ref 7–23)
CALCIUM SERPL-MCNC: 8.9 MG/DL — SIGNIFICANT CHANGE UP (ref 8.4–10.5)
CHLORIDE SERPL-SCNC: 105 MMOL/L — SIGNIFICANT CHANGE UP (ref 96–108)
CO2 SERPL-SCNC: 22 MMOL/L — SIGNIFICANT CHANGE UP (ref 22–31)
CREAT SERPL-MCNC: 1.19 MG/DL — SIGNIFICANT CHANGE UP (ref 0.5–1.3)
CULTURE RESULTS: SIGNIFICANT CHANGE UP
GLUCOSE BLDC GLUCOMTR-MCNC: 102 MG/DL — HIGH (ref 70–99)
GLUCOSE SERPL-MCNC: 210 MG/DL — HIGH (ref 70–99)
HCT VFR BLD CALC: 31 % — LOW (ref 39–50)
HGB BLD-MCNC: 9.9 G/DL — LOW (ref 13–17)
MCHC RBC-ENTMCNC: 30.3 PG — SIGNIFICANT CHANGE UP (ref 27–34)
MCHC RBC-ENTMCNC: 31.9 GM/DL — LOW (ref 32–36)
MCV RBC AUTO: 94.8 FL — SIGNIFICANT CHANGE UP (ref 80–100)
METHOD TYPE: SIGNIFICANT CHANGE UP
NRBC # BLD: 0 /100 WBCS — SIGNIFICANT CHANGE UP (ref 0–0)
ORGANISM # SPEC MICROSCOPIC CNT: SIGNIFICANT CHANGE UP
ORGANISM # SPEC MICROSCOPIC CNT: SIGNIFICANT CHANGE UP
PLATELET # BLD AUTO: 166 K/UL — SIGNIFICANT CHANGE UP (ref 150–400)
POTASSIUM SERPL-MCNC: 4.2 MMOL/L — SIGNIFICANT CHANGE UP (ref 3.5–5.3)
POTASSIUM SERPL-SCNC: 4.2 MMOL/L — SIGNIFICANT CHANGE UP (ref 3.5–5.3)
PROT SERPL-MCNC: 6.7 G/DL — SIGNIFICANT CHANGE UP (ref 6–8.3)
RBC # BLD: 3.27 M/UL — LOW (ref 4.2–5.8)
RBC # FLD: 15.3 % — HIGH (ref 10.3–14.5)
SODIUM SERPL-SCNC: 140 MMOL/L — SIGNIFICANT CHANGE UP (ref 135–145)
SPECIMEN SOURCE: SIGNIFICANT CHANGE UP
WBC # BLD: 4.54 K/UL — SIGNIFICANT CHANGE UP (ref 3.8–10.5)
WBC # FLD AUTO: 4.54 K/UL — SIGNIFICANT CHANGE UP (ref 3.8–10.5)

## 2019-12-02 RX ORDER — MEROPENEM 1 G/30ML
INJECTION INTRAVENOUS
Refills: 0 | Status: DISCONTINUED | OUTPATIENT
Start: 2019-12-02 | End: 2019-12-03

## 2019-12-02 RX ORDER — MEROPENEM 1 G/30ML
500 INJECTION INTRAVENOUS ONCE
Refills: 0 | Status: COMPLETED | OUTPATIENT
Start: 2019-12-02 | End: 2019-12-02

## 2019-12-02 RX ORDER — MEROPENEM 1 G/30ML
500 INJECTION INTRAVENOUS EVERY 8 HOURS
Refills: 0 | Status: DISCONTINUED | OUTPATIENT
Start: 2019-12-03 | End: 2019-12-03

## 2019-12-02 RX ADMIN — ATORVASTATIN CALCIUM 10 MILLIGRAM(S): 80 TABLET, FILM COATED ORAL at 20:58

## 2019-12-02 RX ADMIN — TAMSULOSIN HYDROCHLORIDE 0.4 MILLIGRAM(S): 0.4 CAPSULE ORAL at 20:59

## 2019-12-02 RX ADMIN — RIVASTIGMINE 1 PATCH: 4.6 PATCH, EXTENDED RELEASE TRANSDERMAL at 17:50

## 2019-12-02 RX ADMIN — RIVASTIGMINE 1 PATCH: 4.6 PATCH, EXTENDED RELEASE TRANSDERMAL at 08:03

## 2019-12-02 RX ADMIN — MEROPENEM 100 MILLIGRAM(S): 1 INJECTION INTRAVENOUS at 20:56

## 2019-12-02 RX ADMIN — RIVASTIGMINE 1 PATCH: 4.6 PATCH, EXTENDED RELEASE TRANSDERMAL at 19:37

## 2019-12-02 RX ADMIN — RIVASTIGMINE 1 PATCH: 4.6 PATCH, EXTENDED RELEASE TRANSDERMAL at 17:47

## 2019-12-02 RX ADMIN — FINASTERIDE 5 MILLIGRAM(S): 5 TABLET, FILM COATED ORAL at 17:47

## 2019-12-02 RX ADMIN — AMLODIPINE BESYLATE 10 MILLIGRAM(S): 2.5 TABLET ORAL at 05:40

## 2019-12-02 RX ADMIN — SENNA PLUS 2 TABLET(S): 8.6 TABLET ORAL at 20:58

## 2019-12-02 NOTE — PROGRESS NOTE ADULT - ATTENDING COMMENTS
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

## 2019-12-02 NOTE — PROGRESS NOTE ADULT - ASSESSMENT
89 yo male recently admitted to [facility] for hematuria presents with a change in mental status. Patient found to be very lethargic and unable to ambulate. In ED Patient was found to have a + UA with suggested Urinary tract infection. Patient very lethargic yesterday.  Alert and responsive today. Patient with waxing and waning mental status. Very alert earlier in the past and now  minimally responsive .  Will get ammonia level, abg and EEG to r/o absence seizure  with unarousability. This am not alert and barely arousable.    The patient was hypothermic last night and required warming blankets. 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

## 2019-12-02 NOTE — PROGRESS NOTE ADULT - SUBJECTIVE AND OBJECTIVE BOX
Patient is a 89y old  Male who presents with a chief complaint of change in mental status  Hx of CAD s/p PCI x2, TIA on plavix, Atrial Fibrillation on no AC, HLD, HTN, Dementia, multiple UTI presents with son (who left before triage) with report of AMS. Per triage note, patient currently being treated with antibiotics for UTI. Patient reportedly more confused from baseline, no longer ambulating, weaker. No other review of systems known at this time, patient unable to provide further hx. Meds, allergies per charting, otherwise unable to obtain from patient. Patient last admitted 9/13-9/20 due to hematuria, UTI. Patient found to have a positive UA in ED.   Patient continue to improve with abx.  Patient today more lethargic again c/w yesterday  Daughter at bedside  Sleepy but arousable  then goes right back to sleep.  Patient starting to get stronger,  his BP elevated  - will start patient on amlodipine 10 mg po qd  for BP control.  Patient seen today with increased lethargy. Difficult to arouse. No new or sedating meds given.  Patient with waxing and waning mental status  This am not alert and barely arousble.  The patient was hypothermic last night and required warming blankets.  A.m. and p.m. cortisol levels are normal.   T3, T4 and TSH normal to rule out occult hypothyroidism  as responsible for his comatose behavior. 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 Patient is waking up and eating today. Cognitive function has been waxing and waning. As per the nursing staff the Patient s more awake at night and sleeps during the day.       MEDICATIONS  (STANDING):  amLODIPine   Tablet 10 milliGRAM(s) Oral daily  atorvastatin 10 milliGRAM(s) Oral at bedtime  dextrose 5% + sodium chloride 0.45%. 1000 milliLiter(s) (50 mL/Hr) IV Continuous <Continuous>  finasteride 5 milliGRAM(s) Oral daily  rivastigmine patch  9.5 mG/24 Hr(s) 1 Patch Transdermal every 24 hours  senna 2 Tablet(s) Oral at bedtime  tamsulosin 0.4 milliGRAM(s) Oral at bedtime    MEDICATIONS  (PRN):  acetaminophen    Suspension .. 650 milliGRAM(s) Oral every 4 hours PRN Temp greater or equal to 38C (100.4F), Mild Pain (1 - 3)  polyethylene glycol 3350 17 Gram(s) Oral at bedtime PRN Constipation          VITALS:   T(C): 36.3 (12-02-19 @ 12:51), Max: 36.6 (12-01-19 @ 21:17)  HR: 54 (12-02-19 @ 12:51) (54 - 60)  BP: 114/72 (12-02-19 @ 12:51) (101/58 - 143/77)  RR: 16 (12-02-19 @ 12:51) (16 - 17)  SpO2: 97% (12-02-19 @ 12:51) (96% - 98%)  Wt(kg): --    PHYSICAL EXAM:  GENERAL: NAD, well nourished and conversant  HEAD:  Atraumatic  EYES: EOM, PERRLA, conjunctiva pink and sclera white  ENT: No tonsillar erythema, exudates, or enlargement, moist mucous membranes, good dentition, no lesions  NECK: Supple, No JVD, normal thyroid, carotids with normal upstrokes and no bruits  CHEST/LUNG: Clear to auscultation bilaterally, No rales, rhonchi, wheezing, or rubs  HEART: Regular rate and rhythm, No murmurs, rubs, or gallops  ABDOMEN: Soft, nondistended, no masses, guarding, tenderness or rebound, bowel sounds present  EXTREMITIES:  2+ Peripheral Pulses, No clubbing, cyanosis, or edema. No arthritis of shoulders, elbows, hands, hips, knees, ankles, or feet. No DJD C spine, T spine, or L/S spine  LYMPH: No lymphadenopathy noted  SKIN: No rashes or lesions  NERVOUS SYSTEM: lethargic and confused    LABS:  ABG - ( 30 Nov 2019 18:06 )  pH, Arterial: 7.46  pH, Blood: x     /  pCO2: 36    /  pO2: 80    / HCO3: 25    / Base Excess: 1.4   /  SaO2: 98                    CBC Full  -  ( 02 Dec 2019 06:06 )  WBC Count : 4.54 K/uL  RBC Count : 3.27 M/uL  Hemoglobin : 9.9 g/dL  Hematocrit : 31.0 %  Platelet Count - Automated : 166 K/uL  Mean Cell Volume : 94.8 fl  Mean Cell Hemoglobin : 30.3 pg  Mean Cell Hemoglobin Concentration : 31.9 gm/dL  Auto Neutrophil # : x  Auto Lymphocyte # : x  Auto Monocyte # : x  Auto Eosinophil # : x  Auto Basophil # : x  Auto Neutrophil % : x  Auto Lymphocyte % : x  Auto Monocyte % : x  Auto Eosinophil % : x  Auto Basophil % : x    12-02    140  |  105  |  16  ----------------------------<  210<H>  4.2   |  22  |  1.19    Ca    8.9      02 Dec 2019 06:06    TPro  6.7  /  Alb  3.4  /  TBili  0.3  /  DBili  x   /  AST  29  /  ALT  20  /  AlkPhos  75  12-02    LIVER FUNCTIONS - ( 02 Dec 2019 06:06 )  Alb: 3.4 g/dL / Pro: 6.7 g/dL / ALK PHOS: 75 U/L / ALT: 20 U/L / AST: 29 U/L / GGT: x               CAPILLARY BLOOD GLUCOSE      POCT Blood Glucose.: 103 mg/dL (01 Dec 2019 17:23)      RADIOLOGY & ADDITIONAL TESTS:

## 2019-12-03 PROCEDURE — 99222 1ST HOSP IP/OBS MODERATE 55: CPT | Mod: GC

## 2019-12-03 RX ORDER — ERTAPENEM SODIUM 1 G/1
1000 INJECTION, POWDER, LYOPHILIZED, FOR SOLUTION INTRAMUSCULAR; INTRAVENOUS EVERY 24 HOURS
Refills: 0 | Status: DISCONTINUED | OUTPATIENT
Start: 2019-12-03 | End: 2019-12-04

## 2019-12-03 RX ORDER — SODIUM CHLORIDE 9 MG/ML
1000 INJECTION, SOLUTION INTRAVENOUS
Refills: 0 | Status: DISCONTINUED | OUTPATIENT
Start: 2019-12-03 | End: 2019-12-04

## 2019-12-03 RX ADMIN — RIVASTIGMINE 1 PATCH: 4.6 PATCH, EXTENDED RELEASE TRANSDERMAL at 17:25

## 2019-12-03 RX ADMIN — MEROPENEM 100 MILLIGRAM(S): 1 INJECTION INTRAVENOUS at 05:39

## 2019-12-03 RX ADMIN — FINASTERIDE 5 MILLIGRAM(S): 5 TABLET, FILM COATED ORAL at 22:14

## 2019-12-03 RX ADMIN — RIVASTIGMINE 1 PATCH: 4.6 PATCH, EXTENDED RELEASE TRANSDERMAL at 07:48

## 2019-12-03 RX ADMIN — ATORVASTATIN CALCIUM 10 MILLIGRAM(S): 80 TABLET, FILM COATED ORAL at 22:14

## 2019-12-03 RX ADMIN — ERTAPENEM SODIUM 120 MILLIGRAM(S): 1 INJECTION, POWDER, LYOPHILIZED, FOR SOLUTION INTRAMUSCULAR; INTRAVENOUS at 13:04

## 2019-12-03 RX ADMIN — RIVASTIGMINE 1 PATCH: 4.6 PATCH, EXTENDED RELEASE TRANSDERMAL at 19:30

## 2019-12-03 RX ADMIN — AMLODIPINE BESYLATE 10 MILLIGRAM(S): 2.5 TABLET ORAL at 05:38

## 2019-12-03 RX ADMIN — TAMSULOSIN HYDROCHLORIDE 0.4 MILLIGRAM(S): 0.4 CAPSULE ORAL at 22:14

## 2019-12-03 RX ADMIN — RIVASTIGMINE 1 PATCH: 4.6 PATCH, EXTENDED RELEASE TRANSDERMAL at 17:23

## 2019-12-03 RX ADMIN — SENNA PLUS 2 TABLET(S): 8.6 TABLET ORAL at 22:14

## 2019-12-03 NOTE — PROGRESS NOTE ADULT - ATTENDING COMMENTS
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 Treatment for resistant proteus begun with no clinical improvement to date.

## 2019-12-03 NOTE — CONSULT NOTE ADULT - ATTENDING COMMENTS
89 y/o man with hx of dementia and afib not on AC, p/w AMS. Pt with evidence of CHAN and anemia on lab work. No WBC, urine and blood cultures negative.   Pt with baseline dementia, not AOX3 at baseline. No focal neurological deficits on exam, is awake and alert,  generally weak.     Likely toxic metabolic encephalopathy.   Iv fluids  correct CHAN  Physical therapy
Patient seen and examined  Above verified  Agree with above unless as noted below.  89M PMH BPH (flomax and finasteride), recurrent UTIs (last admitted 9/2019, last treated prior to this admission with ampicillin and keflex), CAD s/p PCI x2, TIA on plavix, atrial fibrillation on no AC, HLD, HTN, dementia presenting 11/18/19 for hematuria and AMS. In ED, leukocytosis, + UA and CHAN; given one dose of ertapenem and started on zosyn x 7 days. Pt hypothermic on 11/30/19 and given one dose of vancomycin. UCx growing ESBL Proteus on 11/30.  Pt denies symptoms but ROS reliability questionable  Would rec check PVR  Urology follow up if elevated  Change coverage to Invanz  If stable 5-7 day course  Will tailor plan for ID issues  per course,results.Will defer to primary team on management of other issues.  Will Follow.  Beeper [id number]-jxmi/afterhours/No response-[id number]

## 2019-12-03 NOTE — CONSULT NOTE ADULT - ASSESSMENT
89M PMH BPH (flomax and finasteride), recurrent UTI (last admitted 9/2019), CAD s/p PCI x2, TIA on plavix, atrial fibrillation on no AC, HLD, HTN, dementia presenting 11/18/19 for hematuria and AMS. In ED, leukocytosis, + UA and CHAN; given one dose of ertapenem and started on zosyn x 7 days. Pt hypothermic on 11/30/19 and given one dose of vancomycin. UCx growing ESBL Proteus on 11/30. Abx switched to meropenem on 12/2/19. ID consulted for ESBL UTI.    # ESBL Proteus  -  c/w meropenem x 7 days 89M PMH BPH (flomax and finasteride), recurrent UTIs (last admitted 9/2019), CAD s/p PCI x2, TIA on plavix, atrial fibrillation on no AC, HLD, HTN, dementia presenting 11/18/19 for hematuria and AMS. In ED, leukocytosis, + UA and CHAN; given one dose of ertapenem and started on zosyn x 7 days. Pt hypothermic on 11/30/19 and given one dose of vancomycin. UCx growing ESBL Proteus on 11/30. Abx switched to meropenem on 12/2/19. ID consulted for ESBL UTI.    # ESBL Proteus  -  c/w meropenem x 7 days 89M PMH BPH (flomax and finasteride), recurrent UTIs (last admitted 9/2019, last treated prior to this admission with ampicillin and keflex), CAD s/p PCI x2, TIA on plavix, atrial fibrillation on no AC, HLD, HTN, dementia presenting 11/18/19 for hematuria and AMS. In ED, leukocytosis, + UA and CHAN; given one dose of ertapenem and started on zosyn x 7 days. Pt hypothermic on 11/30/19 and given one dose of vancomycin. UCx growing ESBL Proteus on 11/30. Abx switched to meropenem on 12/2/19. ID consulted for ESBL UTI.    # ESBL Proteus  -  c/w meropenem x 7 days 89M PMH BPH (flomax and finasteride), recurrent UTIs (last admitted 9/2019, last treated prior to this admission with ampicillin and keflex), CAD s/p PCI x2, TIA on plavix, atrial fibrillation on no AC, HLD, HTN, dementia presenting 11/18/19 for hematuria and AMS. In ED, leukocytosis, + UA and CHAN; given one dose of ertapenem and started on zosyn x 7 days. Pt hypothermic on 11/30/19 and given one dose of vancomycin. UCx growing ESBL Proteus on 11/30. Abx switched to meropenem on 12/2/19. ID consulted for ESBL UTI.    # ESBL Proteus  - change to Invanz 89M PMH BPH (flomax and finasteride), recurrent UTIs (last admitted 9/2019, last treated prior to this admission with ampicillin and keflex), CAD s/p PCI x2, TIA on plavix, atrial fibrillation on no AC, HLD, HTN, dementia presenting 11/18/19 for hematuria and AMS. In ED, leukocytosis, + UA and CHAN; given one dose of ertapenem and started on zosyn x 7 days. Pt hypothermic on 11/30/19 and given one dose of vancomycin. UCx growing ESBL Proteus on 11/30. Abx switched to meropenem on 12/2/19. ID consulted for ESBL UTI.    # ESBL Proteus  - change to Invanz  - Check PVR

## 2019-12-03 NOTE — PROGRESS NOTE ADULT - ASSESSMENT
89 yo male recently admitted to [facility] for hematuria presents with a change in mental status. Patient found to be very lethargic and unable to ambulate. In ED Patient was found to have a + UA with suggested Urinary tract infection. Patient very lethargic yesterday.  Alert and responsive today. Patient with waxing and waning mental status. Very alert earlier in the past and now  minimally responsive .  Will get ammonia level, abg and EEG to r/o absence seizure  with unarousability. This am not alert and barely arousable.    The patient was hypothermic last night and required warming blankets. 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 89 yo male recently admitted to [facility] for hematuria presents with a change in mental status. Patient found to be very lethargic and unable to ambulate. In ED Patient was found to have a + UA with suggested Urinary tract infection. Patient very lethargic yesterday.  Alert and responsive today. Patient with waxing and waning mental status. Very alert earlier in the past and now  minimally responsive .  Will get ammonia level, abg and EEG to r/o absence seizure  with unarousability. This am not alert and barely arousable.    The patient was hypothermic last night and required warming blankets. 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

## 2019-12-03 NOTE — PROGRESS NOTE ADULT - SUBJECTIVE AND OBJECTIVE BOX
Patient is a 89y old  Male who presents with a chief complaint of change in mental status  Hx of CAD s/p PCI x2, TIA on plavix, Atrial Fibrillation on no AC, HLD, HTN, Dementia, multiple UTI presents with son (who left before triage) with report of AMS. Per triage note, patient currently being treated with antibiotics for UTI. Patient reportedly more confused from baseline, no longer ambulating, weaker. No other review of systems known at this time, patient unable to provide further hx. Meds, allergies per charting, otherwise unable to obtain from patient. Patient last admitted 9/13-9/20 due to hematuria, UTI. Patient found to have a positive UA in ED.   Patient continue to improve with abx.  Patient today more lethargic again c/w yesterday  Daughter at bedside  Sleepy but arousable  then goes right back to sleep.  Patient starting to get stronger,  his BP elevated  - will start patient on amlodipine 10 mg po qd  for BP control.  Patient seen today with increased lethargy. Difficult to arouse. No new or sedating meds given.  Patient with waxing and waning mental status  This am not alert and barely arousble.  The patient was hypothermic last night and required warming blankets.  A.m. and p.m. cortisol levels are normal.   T3, T4 and TSH normal to rule out occult hypothyroidism  as responsible for his comatose behavior. 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 Patient is waking up and eating today. Cognitive function has been waxing and waning. As per the nursing staff the Patient s more awake at night and sleeps during the day.     MEDICATIONS  (STANDING):  amLODIPine   Tablet 10 milliGRAM(s) Oral daily  atorvastatin 10 milliGRAM(s) Oral at bedtime  dextrose 5% + sodium chloride 0.45%. 1000 milliLiter(s) (50 mL/Hr) IV Continuous <Continuous>  finasteride 5 milliGRAM(s) Oral daily  meropenem  IVPB      meropenem  IVPB 500 milliGRAM(s) IV Intermittent every 8 hours  rivastigmine patch  9.5 mG/24 Hr(s) 1 Patch Transdermal every 24 hours  senna 2 Tablet(s) Oral at bedtime  tamsulosin 0.4 milliGRAM(s) Oral at bedtime    MEDICATIONS  (PRN):  acetaminophen    Suspension .. 650 milliGRAM(s) Oral every 4 hours PRN Temp greater or equal to 38C (100.4F), Mild Pain (1 - 3)  polyethylene glycol 3350 17 Gram(s) Oral at bedtime PRN Constipation      Vital Signs Last 24 Hrs  T(C): 36.1 (03 Dec 2019 05:36), Max: 36.3 (02 Dec 2019 12:51)  T(F): 97 (03 Dec 2019 05:36), Max: 97.4 (02 Dec 2019 12:51)  HR: 58 (03 Dec 2019 05:36) (54 - 61)  BP: 133/77 (03 Dec 2019 05:36) (101/58 - 161/70)  BP(mean): --  RR: 17 (03 Dec 2019 05:36) (16 - 18)  SpO2: 96% (03 Dec 2019 05:36) (96% - 98%)        PHYSICAL EXAM:  GENERAL: NAD, well nourished and conversant  HEAD:  Atraumatic  EYES: EOM, PERRLA, conjunctiva pink and sclera white  ENT: No tonsillar erythema, exudates, or enlargement, moist mucous membranes, good dentition, no lesions  NECK: Supple, No JVD, normal thyroid, carotids with normal upstrokes and no bruits  CHEST/LUNG: Clear to auscultation bilaterally, No rales, rhonchi, wheezing, or rubs  HEART: Regular rate and rhythm, No murmurs, rubs, or gallops  ABDOMEN: Soft, nondistended, no masses, guarding, tenderness or rebound, bowel sounds present  EXTREMITIES:  2+ Peripheral Pulses, No clubbing, cyanosis, or edema. No arthritis of shoulders, elbows, hands, hips, knees, ankles, or feet. No DJD C spine, T spine, or L/S spine  LYMPH: No lymphadenopathy noted  SKIN: No rashes or lesions  NERVOUS SYSTEM: lethargic and confused    LABS: No labs obtained today Patient is a 89y old  Male who presents with a chief complaint of change in mental status  Hx of CAD s/p PCI x2, TIA on plavix, Atrial Fibrillation on no AC, HLD, HTN, Dementia, multiple UTI presents with son (who left before triage) with report of AMS. Per triage note, patient currently being treated with antibiotics for UTI. Patient reportedly more confused from baseline, no longer ambulating, weaker. No other review of systems known at this time, patient unable to provide further hx. Meds, allergies per charting, otherwise unable to obtain from patient. Patient last admitted 9/13-9/20 due to hematuria, UTI. Patient found to have a positive UA in ED.   Patient continue to improve with abx.  Patient today more lethargic again c/w yesterday  Daughter at bedside  Sleepy but arousable  then goes right back to sleep.  Patient starting to get stronger,  his BP elevated  - will start patient on amlodipine 10 mg po qd  for BP control.  Patient seen today with increased lethargy. Difficult to arouse. No new or sedating meds given.  Patient with waxing and waning mental status  This am not alert and barely arousble.  The patient was hypothermic last night and required warming blankets.  A.m. and p.m. cortisol levels are normal.   T3, T4 and TSH normal to rule out occult hypothyroidism  as responsible for his comatose behavior. 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 Patient is waking up and eating today. Cognitive function has been waxing and waning. As per the nursing staff the Patient s more awake at night and sleeps during the day. Urine culture with resistant proteus and switched  to Ertapenem by I.D.    MEDICATIONS  (STANDING):  amLODIPine   Tablet 10 milliGRAM(s) Oral daily  atorvastatin 10 milliGRAM(s) Oral at bedtime  dextrose 5% + sodium chloride 0.45%. 1000 milliLiter(s) (50 mL/Hr) IV Continuous <Continuous>  finasteride 5 milliGRAM(s) Oral daily  meropenem  IVPB      meropenem  IVPB 500 milliGRAM(s) IV Intermittent every 8 hours  rivastigmine patch  9.5 mG/24 Hr(s) 1 Patch Transdermal every 24 hours  senna 2 Tablet(s) Oral at bedtime  tamsulosin 0.4 milliGRAM(s) Oral at bedtime    MEDICATIONS  (PRN):  acetaminophen    Suspension .. 650 milliGRAM(s) Oral every 4 hours PRN Temp greater or equal to 38C (100.4F), Mild Pain (1 - 3)  polyethylene glycol 3350 17 Gram(s) Oral at bedtime PRN Constipation      Vital Signs Last 24 Hrs  T(C): 36.1 (03 Dec 2019 05:36), Max: 36.3 (02 Dec 2019 12:51)  T(F): 97 (03 Dec 2019 05:36), Max: 97.4 (02 Dec 2019 12:51)  HR: 58 (03 Dec 2019 05:36) (54 - 61)  BP: 133/77 (03 Dec 2019 05:36) (101/58 - 161/70)  BP(mean): --  RR: 17 (03 Dec 2019 05:36) (16 - 18)  SpO2: 96% (03 Dec 2019 05:36) (96% - 98%)        PHYSICAL EXAM:  GENERAL: NAD, well nourished and conversant  HEAD:  Atraumatic  EYES: EOM, PERRLA, conjunctiva pink and sclera white  ENT: No tonsillar erythema, exudates, or enlargement, moist mucous membranes, good dentition, no lesions  NECK: Supple, No JVD, normal thyroid, carotids with normal upstrokes and no bruits  CHEST/LUNG: Clear to auscultation bilaterally, No rales, rhonchi, wheezing, or rubs  HEART: Regular rate and rhythm, No murmurs, rubs, or gallops  ABDOMEN: Soft, nondistended, no masses, guarding, tenderness or rebound, bowel sounds present  EXTREMITIES:  2+ Peripheral Pulses, No clubbing, cyanosis, or edema. No arthritis of shoulders, elbows, hands, hips, knees, ankles, or feet. No DJD C spine, T spine, or L/S spine  LYMPH: No lymphadenopathy noted  SKIN: No rashes or lesions  NERVOUS SYSTEM: lethargic and confused    LABS: No labs obtained today

## 2019-12-03 NOTE — CONSULT NOTE ADULT - SUBJECTIVE AND OBJECTIVE BOX
Patient is a 89y old  Male who presents with a chief complaint of change in mental status (03 Dec 2019 10:36)    HPI:  89M, pre charting, Hx of CAD s/p PCI x2, TIA on plavix, Atrial Fibrillation on no AC, HLD, HTN, Dementia, multiple UTI presents with son (who left before triage) with report of AMS. Per triage note, patient currently being treated with antibiotics for UTI. Patient reportedly more confused from baseline, no longer ambulating, weaker. No other review of systems known at this time, patient unable to provide further hx. Meds, allergies per charting, otherwise unable to obtain from patient. Patient last admitted 9/13-9/20 due to hematuria, UTI. Patient found to have a positive UA in ED. admitted for further treatment (18 Nov 2019 22:00)    ID consulted for ESBL UTI. Pt seen and examined at the bedside. Reporting no complaints at this time. Denies fever/chills, headache, dizziness, SOB, cp, palpitations, abdominal pain, N/V, diarrhea, constipation, urinary changes, or rash       prior hospital charts reviewed [x  ]  primary team notes reviewed [ x ]  other consultant notes reviewed [ x ]    PAST MEDICAL & SURGICAL HISTORY:  Atrial fibrillation  UTI (urinary tract infection): MDR E.coli  Back pain  Fall  Dementia  Hypercholesteremia  HTN - Hypertension  CAD (Coronary Artery Disease)  S/P percutaneous transluminal coronary angioplasty: PCI: stent x2  h/o Hernia Repair    ANTIMICROBIALS:    Allergies  No Known Allergies    ANTIMICROBIALS (past 90 days)  MEDICATIONS  (STANDING):  ertapenem  IVPB   120 mL/Hr IV Intermittent (11-18-19 @ 18:40)    meropenem  IVPB   100 mL/Hr IV Intermittent (12-02-19 @ 20:56)    meropenem  IVPB   100 mL/Hr IV Intermittent (12-03-19 @ 05:39)    piperacillin/tazobactam IVPB.   200 mL/Hr IV Intermittent (11-18-19 @ 23:01)    piperacillin/tazobactam IVPB.   200 mL/Hr IV Intermittent (11-30-19 @ 07:35)    piperacillin/tazobactam IVPB..   25 mL/Hr IV Intermittent (11-25-19 @ 15:57)   25 mL/Hr IV Intermittent (11-25-19 @ 06:00)   25 mL/Hr IV Intermittent (11-24-19 @ 22:05)   25 mL/Hr IV Intermittent (11-24-19 @ 13:19)   25 mL/Hr IV Intermittent (11-24-19 @ 05:23)   25 mL/Hr IV Intermittent (11-23-19 @ 22:23)   25 mL/Hr IV Intermittent (11-23-19 @ 13:40)   25 mL/Hr IV Intermittent (11-23-19 @ 05:00)   25 mL/Hr IV Intermittent (11-22-19 @ 21:24)   25 mL/Hr IV Intermittent (11-22-19 @ 13:20)   25 mL/Hr IV Intermittent (11-22-19 @ 05:09)   25 mL/Hr IV Intermittent (11-21-19 @ 22:46)   25 mL/Hr IV Intermittent (11-21-19 @ 15:01)   25 mL/Hr IV Intermittent (11-21-19 @ 05:40)   25 mL/Hr IV Intermittent (11-20-19 @ 21:47)   25 mL/Hr IV Intermittent (11-20-19 @ 14:09)   25 mL/Hr IV Intermittent (11-20-19 @ 05:51)   25 mL/Hr IV Intermittent (11-19-19 @ 21:43)   25 mL/Hr IV Intermittent (11-19-19 @ 13:14)   25 mL/Hr IV Intermittent (11-19-19 @ 05:28)    piperacillin/tazobactam IVPB..   25 mL/Hr IV Intermittent (11-26-19 @ 15:19)   25 mL/Hr IV Intermittent (11-26-19 @ 06:09)   25 mL/Hr IV Intermittent (11-25-19 @ 23:01)    vancomycin  IVPB   250 mL/Hr IV Intermittent (11-30-19 @ 08:09)    meropenem  IVPB    meropenem  IVPB 500 every 8 hours    OTHER MEDS: MEDICATIONS  (STANDING):  acetaminophen    Suspension .. 650 every 4 hours PRN  amLODIPine   Tablet 10 daily  atorvastatin 10 at bedtime  finasteride 5 daily  polyethylene glycol 3350 17 at bedtime PRN  rivastigmine patch  9.5 mG/24 Hr(s) 1 every 24 hours  senna 2 at bedtime  tamsulosin 0.4 at bedtime    SOCIAL HISTORY:   hx smoking  non-smoker    FAMILY HISTORY:  No pertinent family history in first degree relatives    REVIEW OF SYSTEMS  Constitutional:  [ ] fever [ ] chills  [ ] weight loss  [ ] weakness  Skin:  [ ] rash [ ] phlebitis	  Eyes: [ ] icterus [ ] pain  [ ] discharge	  ENMT: [ ] sore throat  [ ] thrush [ ] ulcers [ ] exudates  Respiratory: [ ] dyspnea [ ] hemoptysis [ ] cough [ ] sputum	  Cardiovascular:  [ ] chest pain [ ] palpitations [ ] edema	  Gastrointestinal:  [ ] nausea [ ] vomiting [ ] diarrhea [ ] constipation [ ] pain	  Genitourinary:  [ ] dysuria [ ] frequency [ ] hematuria [ ] discharge [ ] flank pain  [ ] incontinence  Musculoskeletal:  [ ] myalgias [ ] arthralgias [ ] arthritis  [ ] back pain  Neurological:  [ ] headache [ ] seizures  [ ] confusion/altered mental status  Psychiatric:  [ ] anxiety [ ] depression	  Hematology/Lymphatics:  [ ] lymphadenopathy  Endocrine:  [ ] adrenal [ ] thyroid  Allergic/Immunologic:	 [ ] transplant [ ] seasonal    Vital Signs Last 24 Hrs  T(F): 97 (12-03-19 @ 05:36), Max: 98.3 (11-30-19 @ 18:27)  Vital Signs Last 24 Hrs  HR: 58 (12-03-19 @ 05:36) (54 - 61)  BP: 133/77 (12-03-19 @ 05:36) (114/72 - 161/70)  RR: 17 (12-03-19 @ 05:36)  SpO2: 96% (12-03-19 @ 05:36) (96% - 98%)    PHYSICAL EXAM:  General: non-toxic  HEAD/EYES: anicteric, PERRL, OS erythematous conjunctiva  ENT:  supple  Cardiovascular:   S1, S2, + murmur, irregular rhythm  Respiratory:  clear bilaterally  GI:  soft, non-tender, normal bowel sounds  :  no CVA tenderness   Musculoskeletal:  no synovitis  Neurologic:  grossly non-focal  Skin:  no rash  Lymph: no lymphadenopathy  Psychiatric:  appropriate affect  Vascular:  no phlebitis                        9.9    4.54  )-----------( 166      ( 02 Dec 2019 06:06 )             31.0   12-02    140  |  105  |  16  ----------------------------<  210<H>  4.2   |  22  |  1.19    Ca    8.9      02 Dec 2019 06:06    TPro  6.7  /  Alb  3.4  /  TBili  0.3  /  DBili  x   /  AST  29  /  ALT  20  /  AlkPhos  75  12-02    MICROBIOLOGY:  Culture - Urine (collected 30 Nov 2019 23:12)  Source: .Urine Catheterized  Final Report (02 Dec 2019 18:02):    50,000 - 99,000 CFU/mL Proteus mirabilis ESBL  Organism: Proteus mirabilis ESBL (02 Dec 2019 18:02)  Organism: Proteus mirabilis ESBL (02 Dec 2019 18:02)      -  Amikacin: S <=16      -  Ampicillin: R >16 These ampicillin results predict results for amoxicillin      -  Ampicillin/Sulbactam: R >16/8 Enterobacter, Citrobacter, and Serratia may develop resistance during prolonged therapy (3-4 days)      -  Aztreonam: R 8      -  Cefazolin: R >16 (MIC_CL_COM_ENTERIC_CEFAZU) For uncomplicated UTI with K. pneumoniae, E. coli, or P. mirablis: BEKA <=16 is sensitive and BEKA >=32 is resistant. This also predicts results for oral agents cefaclor, cefdinir, cefpodoxime, cefprozil, cefuroxime axetil, cephalexin and locarbef for uncomplicated UTI. Note that some isolates may be susceptible to these agents while testing resistant to cefazolin.      -  Cefepime: R >16      -  Cefoxitin: S <=8      -  Ceftriaxone: R >32 Enterobacter, Citrobacter, and Serratia may develop resistance during prolonged therapy      -  Ciprofloxacin: R >2      -  Ertapenem: S <=1      -  Gentamicin: I 8      -  Levofloxacin: R >4      -  Meropenem: S <=1      -  Nitrofurantoin: R >64 Should not be used to treat pyelonephritis      -  Piperacillin/Tazobactam: R <=16      -  Tobramycin: S <=4      -  Trimethoprim/Sulfamethoxazole: R >2/38      Method Type: BEKA    Culture - Blood (collected 30 Nov 2019 10:18)  Source: .Blood Blood-Peripheral  Preliminary Report (01 Dec 2019 11:01):    No growth to date.    Culture - Blood (collected 30 Nov 2019 10:18)  Source: .Blood Blood-Peripheral  Preliminary Report (01 Dec 2019 11:01):    No growth to date.    Rapid RVP Result: NotDetec (11-30 @ 07:13)    RADIOLOGY:  imaging below personally reviewed    < from: Xray Chest 1 View- PORTABLE-Urgent (11.30.19 @ 07:47) >  COMPARISON: 11/25/2019  FINDINGS/  IMPRESSION:  Clear lungs. Nopleural effusion or pneumothorax.    < from: CT Head No Cont (11.30.19 @ 22:33) >  FINDINGS:   There is no acute intracranial hemorrhage, extra-axial collection,   vasogenic edema, mass effect, midline shift, central herniation, or   hydrocephalus.  Age-related involution changes of the brain evidenced by prominence of   the ventricles and sulci. There is moderate patchy white matter   hypoattenuation which is nonspecific in nature and likely related to   chronic microvascular ischemic disease.  Unchanged small chronic infarct in the posterior left frontal region.   Unchanged chronic bilateral basal ganglia infarcts.  The visualized paranasal sinuses are clear. The mastoid air cells and   middle ear cavities are grossly clear.  No scalp hematoma or displaced calvarial fracture.  IMPRESSION:   No acute intracranial hemorrhage, brain edema, or mass effect.    < from: US Kidney and Bladder (09.13.19 @ 16:15) >  FINDINGS:  Right kidney:  10.1 cm. Increased parenchymal echogenicity. No renal   mass, hydronephrosis or calculi.  Left kidney:  10.8 cm. Increased parenchymal echogenicity. No renal mass,   hydronephrosis or calculi.  Urinary bladder: Decompressed secondary to Mai catheter.  IMPRESSION:   No hydronephrosis.    < from: CT Abdomen and Pelvis w/ IV Cont (09.17.19 @ 19:54) >  FINDINGS:  LOWER CHEST: Small bilateral pleural effusions with associated   compressive atelectasis.  LIVER: Within normal limits.  BILE DUCTS: Normal caliber.  GALLBLADDER: Within normal limits.  SPLEEN: Within normal limits.  PANCREAS: Withinnormal limits.  ADRENALS: Within normal limits.  KIDNEYS/URETERS: Within normal limits.  BLADDER: Contains a Mai catheter balloon. Mural thickening of the   urinary bladder with surrounding inflammatory changes, possibly cystitis.  REPRODUCTIVE ORGANS: Prostate is enlarged.  BOWEL: No bowel obstruction. Colonic diverticulosis.  PERITONEUM: No ascites.  VESSELS: Atherosclerotic changes.  RETROPERITONEUM/LYMPH NODES: No lymphadenopathy.    ABDOMINAL WALL: Within normal limits.  BONES: Degenerative changes. Old left rib fracture.  IMPRESSION:   Cystitis. Patient is a 89y old  Male who presents with a chief complaint of change in mental status (03 Dec 2019 10:36)    HPI:  89M, pre charting, Hx of CAD s/p PCI x2, TIA on plavix, Atrial Fibrillation on no AC, HLD, HTN, Dementia, multiple UTI presents with son (who left before triage) with report of AMS. Per triage note, patient currently being treated with antibiotics (ampicillin and keflex for UTI). Patient reportedly more confused from baseline, no longer ambulating, weaker. No other review of systems known at this time, patient unable to provide further hx. Meds, allergies per charting, otherwise unable to obtain from patient. Patient last admitted 9/13-9/20 due to hematuria, UTI. Patient found to have a positive UA in ED. admitted for further treatment (18 Nov 2019 22:00)    ID consulted for ESBL UTI. Pt seen and examined at the bedside. Reporting no complaints at this time. Denies fever/chills, headache, dizziness, SOB, cp, palpitations, abdominal pain, N/V, diarrhea, constipation, urinary changes, or rash       prior hospital charts reviewed [x  ]  primary team notes reviewed [ x ]  other consultant notes reviewed [ x ]    PAST MEDICAL & SURGICAL HISTORY:  Atrial fibrillation  UTI (urinary tract infection): MDR E.coli  Back pain  Fall  Dementia  Hypercholesteremia  HTN - Hypertension  CAD (Coronary Artery Disease)  S/P percutaneous transluminal coronary angioplasty: PCI: stent x2  h/o Hernia Repair    ANTIMICROBIALS:    Allergies  No Known Allergies    ANTIMICROBIALS (past 90 days)  MEDICATIONS  (STANDING):  ertapenem  IVPB   120 mL/Hr IV Intermittent (11-18-19 @ 18:40)    meropenem  IVPB   100 mL/Hr IV Intermittent (12-02-19 @ 20:56)    meropenem  IVPB   100 mL/Hr IV Intermittent (12-03-19 @ 05:39)    piperacillin/tazobactam IVPB.   200 mL/Hr IV Intermittent (11-18-19 @ 23:01)    piperacillin/tazobactam IVPB.   200 mL/Hr IV Intermittent (11-30-19 @ 07:35)    piperacillin/tazobactam IVPB..   25 mL/Hr IV Intermittent (11-25-19 @ 15:57)   25 mL/Hr IV Intermittent (11-25-19 @ 06:00)   25 mL/Hr IV Intermittent (11-24-19 @ 22:05)   25 mL/Hr IV Intermittent (11-24-19 @ 13:19)   25 mL/Hr IV Intermittent (11-24-19 @ 05:23)   25 mL/Hr IV Intermittent (11-23-19 @ 22:23)   25 mL/Hr IV Intermittent (11-23-19 @ 13:40)   25 mL/Hr IV Intermittent (11-23-19 @ 05:00)   25 mL/Hr IV Intermittent (11-22-19 @ 21:24)   25 mL/Hr IV Intermittent (11-22-19 @ 13:20)   25 mL/Hr IV Intermittent (11-22-19 @ 05:09)   25 mL/Hr IV Intermittent (11-21-19 @ 22:46)   25 mL/Hr IV Intermittent (11-21-19 @ 15:01)   25 mL/Hr IV Intermittent (11-21-19 @ 05:40)   25 mL/Hr IV Intermittent (11-20-19 @ 21:47)   25 mL/Hr IV Intermittent (11-20-19 @ 14:09)   25 mL/Hr IV Intermittent (11-20-19 @ 05:51)   25 mL/Hr IV Intermittent (11-19-19 @ 21:43)   25 mL/Hr IV Intermittent (11-19-19 @ 13:14)   25 mL/Hr IV Intermittent (11-19-19 @ 05:28)    piperacillin/tazobactam IVPB..   25 mL/Hr IV Intermittent (11-26-19 @ 15:19)   25 mL/Hr IV Intermittent (11-26-19 @ 06:09)   25 mL/Hr IV Intermittent (11-25-19 @ 23:01)    vancomycin  IVPB   250 mL/Hr IV Intermittent (11-30-19 @ 08:09)    meropenem  IVPB    meropenem  IVPB 500 every 8 hours    OTHER MEDS: MEDICATIONS  (STANDING):  acetaminophen    Suspension .. 650 every 4 hours PRN  amLODIPine   Tablet 10 daily  atorvastatin 10 at bedtime  finasteride 5 daily  polyethylene glycol 3350 17 at bedtime PRN  rivastigmine patch  9.5 mG/24 Hr(s) 1 every 24 hours  senna 2 at bedtime  tamsulosin 0.4 at bedtime    SOCIAL HISTORY:   hx smoking  non-smoker    FAMILY HISTORY:  No pertinent family history in first degree relatives    REVIEW OF SYSTEMS  Constitutional:  [ ] fever [ ] chills  [ ] weight loss  [ ] weakness  Skin:  [ ] rash [ ] phlebitis	  Eyes: [ ] icterus [ ] pain  [ ] discharge	  ENMT: [ ] sore throat  [ ] thrush [ ] ulcers [ ] exudates  Respiratory: [ ] dyspnea [ ] hemoptysis [ ] cough [ ] sputum	  Cardiovascular:  [ ] chest pain [ ] palpitations [ ] edema	  Gastrointestinal:  [ ] nausea [ ] vomiting [ ] diarrhea [ ] constipation [ ] pain	  Genitourinary:  [ ] dysuria [ ] frequency [ ] hematuria [ ] discharge [ ] flank pain  [ ] incontinence  Musculoskeletal:  [ ] myalgias [ ] arthralgias [ ] arthritis  [ ] back pain  Neurological:  [ ] headache [ ] seizures  [ ] confusion/altered mental status  Psychiatric:  [ ] anxiety [ ] depression	  Hematology/Lymphatics:  [ ] lymphadenopathy  Endocrine:  [ ] adrenal [ ] thyroid  Allergic/Immunologic:	 [ ] transplant [ ] seasonal    Vital Signs Last 24 Hrs  T(F): 97 (12-03-19 @ 05:36), Max: 98.3 (11-30-19 @ 18:27)  Vital Signs Last 24 Hrs  HR: 58 (12-03-19 @ 05:36) (54 - 61)  BP: 133/77 (12-03-19 @ 05:36) (114/72 - 161/70)  RR: 17 (12-03-19 @ 05:36)  SpO2: 96% (12-03-19 @ 05:36) (96% - 98%)    PHYSICAL EXAM:  General: non-toxic  HEAD/EYES: anicteric, PERRL, OS erythematous conjunctiva  ENT:  supple  Cardiovascular:   S1, S2, + murmur, irregular rhythm  Respiratory:  clear bilaterally  GI:  soft, non-tender, normal bowel sounds  :  no CVA tenderness   Musculoskeletal:  no synovitis  Neurologic:  grossly non-focal  Skin:  no rash  Lymph: no lymphadenopathy  Psychiatric:  appropriate affect  Vascular:  no phlebitis                        9.9    4.54  )-----------( 166      ( 02 Dec 2019 06:06 )             31.0   12-02    140  |  105  |  16  ----------------------------<  210<H>  4.2   |  22  |  1.19    Ca    8.9      02 Dec 2019 06:06    TPro  6.7  /  Alb  3.4  /  TBili  0.3  /  DBili  x   /  AST  29  /  ALT  20  /  AlkPhos  75  12-02    MICROBIOLOGY:  Culture - Urine (collected 30 Nov 2019 23:12)  Source: .Urine Catheterized  Final Report (02 Dec 2019 18:02):    50,000 - 99,000 CFU/mL Proteus mirabilis ESBL  Organism: Proteus mirabilis ESBL (02 Dec 2019 18:02)  Organism: Proteus mirabilis ESBL (02 Dec 2019 18:02)      -  Amikacin: S <=16      -  Ampicillin: R >16 These ampicillin results predict results for amoxicillin      -  Ampicillin/Sulbactam: R >16/8 Enterobacter, Citrobacter, and Serratia may develop resistance during prolonged therapy (3-4 days)      -  Aztreonam: R 8      -  Cefazolin: R >16 (MIC_CL_COM_ENTERIC_CEFAZU) For uncomplicated UTI with K. pneumoniae, E. coli, or P. mirablis: BEKA <=16 is sensitive and BEKA >=32 is resistant. This also predicts results for oral agents cefaclor, cefdinir, cefpodoxime, cefprozil, cefuroxime axetil, cephalexin and locarbef for uncomplicated UTI. Note that some isolates may be susceptible to these agents while testing resistant to cefazolin.      -  Cefepime: R >16      -  Cefoxitin: S <=8      -  Ceftriaxone: R >32 Enterobacter, Citrobacter, and Serratia may develop resistance during prolonged therapy      -  Ciprofloxacin: R >2      -  Ertapenem: S <=1      -  Gentamicin: I 8      -  Levofloxacin: R >4      -  Meropenem: S <=1      -  Nitrofurantoin: R >64 Should not be used to treat pyelonephritis      -  Piperacillin/Tazobactam: R <=16      -  Tobramycin: S <=4      -  Trimethoprim/Sulfamethoxazole: R >2/38      Method Type: BEKA    Culture - Blood (collected 30 Nov 2019 10:18)  Source: .Blood Blood-Peripheral  Preliminary Report (01 Dec 2019 11:01):    No growth to date.    Culture - Blood (collected 30 Nov 2019 10:18)  Source: .Blood Blood-Peripheral  Preliminary Report (01 Dec 2019 11:01):    No growth to date.    Rapid RVP Result: NotDetec (11-30 @ 07:13)    RADIOLOGY:  imaging below personally reviewed    < from: Xray Chest 1 View- PORTABLE-Urgent (11.30.19 @ 07:47) >  COMPARISON: 11/25/2019  FINDINGS/  IMPRESSION:  Clear lungs. Nopleural effusion or pneumothorax.    < from: CT Head No Cont (11.30.19 @ 22:33) >  FINDINGS:   There is no acute intracranial hemorrhage, extra-axial collection,   vasogenic edema, mass effect, midline shift, central herniation, or   hydrocephalus.  Age-related involution changes of the brain evidenced by prominence of   the ventricles and sulci. There is moderate patchy white matter   hypoattenuation which is nonspecific in nature and likely related to   chronic microvascular ischemic disease.  Unchanged small chronic infarct in the posterior left frontal region.   Unchanged chronic bilateral basal ganglia infarcts.  The visualized paranasal sinuses are clear. The mastoid air cells and   middle ear cavities are grossly clear.  No scalp hematoma or displaced calvarial fracture.  IMPRESSION:   No acute intracranial hemorrhage, brain edema, or mass effect.    < from: US Kidney and Bladder (09.13.19 @ 16:15) >  FINDINGS:  Right kidney:  10.1 cm. Increased parenchymal echogenicity. No renal   mass, hydronephrosis or calculi.  Left kidney:  10.8 cm. Increased parenchymal echogenicity. No renal mass,   hydronephrosis or calculi.  Urinary bladder: Decompressed secondary to Mai catheter.  IMPRESSION:   No hydronephrosis.    < from: CT Abdomen and Pelvis w/ IV Cont (09.17.19 @ 19:54) >  FINDINGS:  LOWER CHEST: Small bilateral pleural effusions with associated   compressive atelectasis.  LIVER: Within normal limits.  BILE DUCTS: Normal caliber.  GALLBLADDER: Within normal limits.  SPLEEN: Within normal limits.  PANCREAS: Withinnormal limits.  ADRENALS: Within normal limits.  KIDNEYS/URETERS: Within normal limits.  BLADDER: Contains a Mai catheter balloon. Mural thickening of the   urinary bladder with surrounding inflammatory changes, possibly cystitis.  REPRODUCTIVE ORGANS: Prostate is enlarged.  BOWEL: No bowel obstruction. Colonic diverticulosis.  PERITONEUM: No ascites.  VESSELS: Atherosclerotic changes.  RETROPERITONEUM/LYMPH NODES: No lymphadenopathy.    ABDOMINAL WALL: Within normal limits.  BONES: Degenerative changes. Old left rib fracture.  IMPRESSION:   Cystitis.

## 2019-12-04 ENCOUNTER — TRANSCRIPTION ENCOUNTER (OUTPATIENT)
Age: 84
End: 2019-12-04

## 2019-12-04 ENCOUNTER — APPOINTMENT (OUTPATIENT)
Dept: CARDIOLOGY | Facility: CLINIC | Age: 84
End: 2019-12-04

## 2019-12-04 VITALS
OXYGEN SATURATION: 96 % | RESPIRATION RATE: 16 BRPM | HEART RATE: 60 BPM | TEMPERATURE: 98 F | SYSTOLIC BLOOD PRESSURE: 109 MMHG | DIASTOLIC BLOOD PRESSURE: 66 MMHG

## 2019-12-04 LAB
ANION GAP SERPL CALC-SCNC: 13 MMOL/L — SIGNIFICANT CHANGE UP (ref 5–17)
BUN SERPL-MCNC: 18 MG/DL — SIGNIFICANT CHANGE UP (ref 7–23)
CALCIUM SERPL-MCNC: 9.2 MG/DL — SIGNIFICANT CHANGE UP (ref 8.4–10.5)
CHLORIDE SERPL-SCNC: 104 MMOL/L — SIGNIFICANT CHANGE UP (ref 96–108)
CO2 SERPL-SCNC: 24 MMOL/L — SIGNIFICANT CHANGE UP (ref 22–31)
CREAT SERPL-MCNC: 1.15 MG/DL — SIGNIFICANT CHANGE UP (ref 0.5–1.3)
GLUCOSE SERPL-MCNC: 86 MG/DL — SIGNIFICANT CHANGE UP (ref 70–99)
POTASSIUM SERPL-MCNC: 4.1 MMOL/L — SIGNIFICANT CHANGE UP (ref 3.5–5.3)
POTASSIUM SERPL-SCNC: 4.1 MMOL/L — SIGNIFICANT CHANGE UP (ref 3.5–5.3)
SODIUM SERPL-SCNC: 141 MMOL/L — SIGNIFICANT CHANGE UP (ref 135–145)

## 2019-12-04 PROCEDURE — 36569 INSJ PICC 5 YR+ W/O IMAGING: CPT

## 2019-12-04 PROCEDURE — 87389 HIV-1 AG W/HIV-1&-2 AB AG IA: CPT

## 2019-12-04 PROCEDURE — 99238 HOSP IP/OBS DSCHRG MGMT 30/<: CPT

## 2019-12-04 PROCEDURE — 83735 ASSAY OF MAGNESIUM: CPT

## 2019-12-04 PROCEDURE — 85027 COMPLETE CBC AUTOMATED: CPT

## 2019-12-04 PROCEDURE — 93005 ELECTROCARDIOGRAM TRACING: CPT

## 2019-12-04 PROCEDURE — 97162 PT EVAL MOD COMPLEX 30 MIN: CPT

## 2019-12-04 PROCEDURE — 82803 BLOOD GASES ANY COMBINATION: CPT

## 2019-12-04 PROCEDURE — 51701 INSERT BLADDER CATHETER: CPT

## 2019-12-04 PROCEDURE — G0515: CPT

## 2019-12-04 PROCEDURE — 84100 ASSAY OF PHOSPHORUS: CPT

## 2019-12-04 PROCEDURE — 87486 CHLMYD PNEUM DNA AMP PROBE: CPT

## 2019-12-04 PROCEDURE — 70450 CT HEAD/BRAIN W/O DYE: CPT

## 2019-12-04 PROCEDURE — 81001 URINALYSIS AUTO W/SCOPE: CPT

## 2019-12-04 PROCEDURE — 84443 ASSAY THYROID STIM HORMONE: CPT

## 2019-12-04 PROCEDURE — 99285 EMERGENCY DEPT VISIT HI MDM: CPT | Mod: 25

## 2019-12-04 PROCEDURE — 71045 X-RAY EXAM CHEST 1 VIEW: CPT

## 2019-12-04 PROCEDURE — 95816 EEG AWAKE AND DROWSY: CPT

## 2019-12-04 PROCEDURE — 84480 ASSAY TRIIODOTHYRONINE (T3): CPT

## 2019-12-04 PROCEDURE — 70498 CT ANGIOGRAPHY NECK: CPT

## 2019-12-04 PROCEDURE — 96374 THER/PROPH/DIAG INJ IV PUSH: CPT | Mod: XU

## 2019-12-04 PROCEDURE — 87633 RESP VIRUS 12-25 TARGETS: CPT

## 2019-12-04 PROCEDURE — 97535 SELF CARE MNGMENT TRAINING: CPT

## 2019-12-04 PROCEDURE — 99232 SBSQ HOSP IP/OBS MODERATE 35: CPT

## 2019-12-04 PROCEDURE — 84145 PROCALCITONIN (PCT): CPT

## 2019-12-04 PROCEDURE — C1751: CPT

## 2019-12-04 PROCEDURE — 97530 THERAPEUTIC ACTIVITIES: CPT

## 2019-12-04 PROCEDURE — 87581 M.PNEUMON DNA AMP PROBE: CPT

## 2019-12-04 PROCEDURE — 87040 BLOOD CULTURE FOR BACTERIA: CPT

## 2019-12-04 PROCEDURE — 83615 LACTATE (LD) (LDH) ENZYME: CPT

## 2019-12-04 PROCEDURE — 97166 OT EVAL MOD COMPLEX 45 MIN: CPT

## 2019-12-04 PROCEDURE — 87798 DETECT AGENT NOS DNA AMP: CPT

## 2019-12-04 PROCEDURE — 82962 GLUCOSE BLOOD TEST: CPT

## 2019-12-04 PROCEDURE — 97116 GAIT TRAINING THERAPY: CPT

## 2019-12-04 PROCEDURE — 82140 ASSAY OF AMMONIA: CPT

## 2019-12-04 PROCEDURE — 70551 MRI BRAIN STEM W/O DYE: CPT

## 2019-12-04 PROCEDURE — 87186 SC STD MICRODIL/AGAR DIL: CPT

## 2019-12-04 PROCEDURE — 70496 CT ANGIOGRAPHY HEAD: CPT

## 2019-12-04 PROCEDURE — 82533 TOTAL CORTISOL: CPT

## 2019-12-04 PROCEDURE — 83605 ASSAY OF LACTIC ACID: CPT

## 2019-12-04 PROCEDURE — 84439 ASSAY OF FREE THYROXINE: CPT

## 2019-12-04 PROCEDURE — 80048 BASIC METABOLIC PNL TOTAL CA: CPT

## 2019-12-04 PROCEDURE — 36600 WITHDRAWAL OF ARTERIAL BLOOD: CPT

## 2019-12-04 PROCEDURE — 87086 URINE CULTURE/COLONY COUNT: CPT

## 2019-12-04 PROCEDURE — 80053 COMPREHEN METABOLIC PANEL: CPT

## 2019-12-04 PROCEDURE — 86780 TREPONEMA PALLIDUM: CPT

## 2019-12-04 RX ORDER — AMLODIPINE BESYLATE 2.5 MG/1
1 TABLET ORAL
Qty: 0 | Refills: 0 | DISCHARGE
Start: 2019-12-04

## 2019-12-04 RX ORDER — ERTAPENEM SODIUM 1 G/1
1 INJECTION, POWDER, LYOPHILIZED, FOR SOLUTION INTRAMUSCULAR; INTRAVENOUS
Qty: 0 | Refills: 0 | DISCHARGE
Start: 2019-12-04

## 2019-12-04 RX ADMIN — AMLODIPINE BESYLATE 10 MILLIGRAM(S): 2.5 TABLET ORAL at 06:20

## 2019-12-04 RX ADMIN — ERTAPENEM SODIUM 120 MILLIGRAM(S): 1 INJECTION, POWDER, LYOPHILIZED, FOR SOLUTION INTRAMUSCULAR; INTRAVENOUS at 11:59

## 2019-12-04 RX ADMIN — RIVASTIGMINE 1 PATCH: 4.6 PATCH, EXTENDED RELEASE TRANSDERMAL at 08:42

## 2019-12-04 NOTE — PROGRESS NOTE ADULT - ATTENDING COMMENTS
Patient dced to rehab  continue current meds  PO as tolerated  follow up in office  Family aware of plan

## 2019-12-04 NOTE — PROGRESS NOTE ADULT - NSHPATTENDINGPLANDISCUSS_GEN_ALL_CORE
the medical N.p

## 2019-12-04 NOTE — PROGRESS NOTE ADULT - ASSESSMENT
89M PMH BPH (flomax and finasteride), recurrent UTIs (last admitted 9/2019, last treated prior to this admission with ampicillin and keflex), CAD s/p PCI x2, TIA on plavix, atrial fibrillation on no AC, HLD, HTN, dementia presenting 11/18/19 for hematuria and AMS. In ED, leukocytosis, + UA and CHAN; given one dose of ertapenem and started on zosyn x 7 days. Pt hypothermic on 11/30/19 and given one dose of vancomycin. UCx growing ESBL Proteus on 11/30.  Pt denies symptoms but ROS reliability questionable  check PVR  Urology follow up if elevated  continue  Invanz  If stable 5  day course  Will tailor plan for ID issues  per course,results.Will defer to primary team on management of other issues.  case d/w Med Np   Will Follow.  Beeper [id number]/afterhours/No [id number].

## 2019-12-04 NOTE — PROGRESS NOTE ADULT - SUBJECTIVE AND OBJECTIVE BOX
Patient is a 89y old  Male who presents with a chief complaint of change in mental status  Hx of CAD s/p PCI x2, TIA on plavix, Atrial Fibrillation on no AC, HLD, HTN, Dementia, multiple UTI presents with son (who left before triage) with report of AMS. Per triage note, patient currently being treated with antibiotics for UTI. Patient reportedly more confused from baseline, no longer ambulating, weaker. No other review of systems known at this time, patient unable to provide further hx. Meds, allergies per charting, otherwise unable to obtain from patient. Patient last admitted 9/13-9/20 due to hematuria, UTI. Patient found to have a positive UA in ED.   Patient continue to improve with abx.  Patient today more lethargic again c/w yesterday  Daughter at bedside  Sleepy but arousable  then goes right back to sleep.  Patient starting to get stronger,  his BP elevated  - will start patient on amlodipine 10 mg po qd  for BP control.  Patient seen today with increased lethargy. Difficult to arouse. No new or sedating meds given.  Patient with waxing and waning mental status  This am not alert and barely arousble.  The patient was hypothermic last night and required warming blankets.  A.m. and p.m. cortisol levels are normal.   T3, T4 and TSH normal to rule out occult hypothyroidism  as responsible for his comatose behavior. 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 Patient is waking up and eating today. Cognitive function has been waxing and waning. As per the nursing staff the Patient s more awake at night and sleeps during the day. Urine culture with resistant proteus and switched  to Ertapenem by I.D.    MEDICATIONS  (STANDING):  amLODIPine   Tablet 10 milliGRAM(s) Oral daily  atorvastatin 10 milliGRAM(s) Oral at bedtime  dextrose 5% + sodium chloride 0.45%. 1000 milliLiter(s) (50 mL/Hr) IV Continuous <Continuous>  finasteride 5 milliGRAM(s) Oral daily  meropenem  IVPB      meropenem  IVPB 500 milliGRAM(s) IV Intermittent every 8 hours  rivastigmine patch  9.5 mG/24 Hr(s) 1 Patch Transdermal every 24 hours  senna 2 Tablet(s) Oral at bedtime  tamsulosin 0.4 milliGRAM(s) Oral at bedtime    MEDICATIONS  (PRN):  acetaminophen    Suspension .. 650 milliGRAM(s) Oral every 4 hours PRN Temp greater or equal to 38C (100.4F), Mild Pain (1 - 3)  polyethylene glycol 3350 17 Gram(s) Oral at bedtime PRN Constipation      Vital Signs Last 24 Hrs  T(C): 36.1 (03 Dec 2019 05:36), Max: 36.3 (02 Dec 2019 12:51)  T(F): 97 (03 Dec 2019 05:36), Max: 97.4 (02 Dec 2019 12:51)  HR: 58 (03 Dec 2019 05:36) (54 - 61)  BP: 133/77 (03 Dec 2019 05:36) (101/58 - 161/70)  BP(mean): --  RR: 17 (03 Dec 2019 05:36) (16 - 18)  SpO2: 96% (03 Dec 2019 05:36) (96% - 98%)        PHYSICAL EXAM:  GENERAL: NAD, well nourished and conversant  HEAD:  Atraumatic  EYES: EOM, PERRLA, conjunctiva pink and sclera white  ENT: No tonsillar erythema, exudates, or enlargement, moist mucous membranes, good dentition, no lesions  NECK: Supple, No JVD, normal thyroid, carotids with normal upstrokes and no bruits  CHEST/LUNG: Clear to auscultation bilaterally, No rales, rhonchi, wheezing, or rubs  HEART: Regular rate and rhythm, No murmurs, rubs, or gallops  ABDOMEN: Soft, nondistended, no masses, guarding, tenderness or rebound, bowel sounds present  EXTREMITIES:  2+ Peripheral Pulses, No clubbing, cyanosis, or edema. No arthritis of shoulders, elbows, hands, hips, knees, ankles, or feet. No DJD C spine, T spine, or L/S spine  LYMPH: No lymphadenopathy noted  SKIN: No rashes or lesions  NERVOUS SYSTEM: lethargic and confused    LABS: No labs obtained today

## 2019-12-04 NOTE — PROGRESS NOTE ADULT - ASSESSMENT
89 yo male recently admitted to [facility] for hematuria presents with a change in mental status. Patient found to be very lethargic and unable to ambulate. In ED Patient was found to have a + UA with suggested Urinary tract infection. Patient very lethargic yesterday.  Alert and responsive today. Patient with waxing and waning mental status. Very alert earlier in the past and now  minimally responsive .  Will get ammonia level, abg and EEG to r/o absence seizure  with unarousability. This am not alert and barely arousable.    The patient was hypothermic last night and required warming blankets. 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

## 2019-12-04 NOTE — PROGRESS NOTE ADULT - REASON FOR ADMISSION
change in mental status

## 2019-12-04 NOTE — PROGRESS NOTE ADULT - SUBJECTIVE AND OBJECTIVE BOX
Patient is a 89y old  Male who presents with a chief complaint of change in mental status (03 Dec 2019 10:53)    Being followed by ID for UTI     Interval history:kletahrgic though arousable and answers queries appropriately  No acute events      ROS:denies any urinary complaints   No cough,SOB,CP  No N/V/D./abd pain  No other complaints      Antimicrobials:    ertapenem  IVPB 1000 milliGRAM(s) IV Intermittent every 24 hours    Other medications reviewed    Vital Signs Last 24 Hrs  T(C): 36.2 (12-04-19 @ 04:38), Max: 36.6 (12-03-19 @ 14:31)  T(F): 97.2 (12-04-19 @ 04:38), Max: 97.9 (12-03-19 @ 14:31)  HR: 53 (12-04-19 @ 06:17) (53 - 73)  BP: 120/76 (12-04-19 @ 06:17) (120/72 - 134/83)  BP(mean): --  RR: 18 (12-04-19 @ 06:17) (18 - 18)  SpO2: 96% (12-04-19 @ 06:17) (94% - 96%)    Physical Exam:        HEENT PERRLA EOMI    No oral exudate or erythema    Chest Good AE,CTA    CVS RRR S1 S2 WNl No murmur or rub or gallop    Abd soft BS normal No tenderness no masses    IV site no erythema tenderness or discharge    CNS As above     Lab Data:    WBC Count: 4.54 (12-02-19 @ 06:06)  WBC Count: 4.52 (11-30-19 @ 07:33)      12-04    141  |  104  |  18  ----------------------------<  86  4.1   |  24  |  1.15    Ca    9.2      04 Dec 2019 06:35          Culture - Urine (collected 30 Nov 2019 23:12)  Source: .Urine Catheterized  Final Report (02 Dec 2019 18:02):    50,000 - 99,000 CFU/mL Proteus mirabilis ESBL  Organism: Proteus mirabilis ESBL (02 Dec 2019 18:02)  Organism: Proteus mirabilis ESBL (02 Dec 2019 18:02)      -  Amikacin: S <=16      -  Ampicillin: R >16 These ampicillin results predict results for amoxicillin      -  Ampicillin/Sulbactam: R >16/8 Enterobacter, Citrobacter, and Serratia may develop resistance during prolonged therapy (3-4 days)      -  Aztreonam: R 8      -  Cefazolin: R >16 (MIC_CL_COM_ENTERIC_CEFAZU) For uncomplicated UTI with K. pneumoniae, E. coli, or P. mirablis: BEKA <=16 is sensitive and BEKA >=32 is resistant. This also predicts results for oral agents cefaclor, cefdinir, cefpodoxime, cefprozil, cefuroxime axetil, cephalexin and locarbef for uncomplicated UTI. Note that some isolates may be susceptible to these agents while testing resistant to cefazolin.      -  Cefepime: R >16      -  Cefoxitin: S <=8      -  Ceftriaxone: R >32 Enterobacter, Citrobacter, and Serratia may develop resistance during prolonged therapy      -  Ciprofloxacin: R >2      -  Ertapenem: S <=1      -  Gentamicin: I 8      -  Levofloxacin: R >4      -  Meropenem: S <=1      -  Nitrofurantoin: R >64 Should not be used to treat pyelonephritis      -  Piperacillin/Tazobactam: R <=16      -  Tobramycin: S <=4      -  Trimethoprim/Sulfamethoxazole: R >2/38      Method Type: BEKA    Culture - Blood (collected 30 Nov 2019 10:18)  Source: .Blood Blood-Peripheral  Preliminary Report (01 Dec 2019 11:01):    No growth to date.    Culture - Blood (collected 30 Nov 2019 10:18)  Source: .Blood Blood-Peripheral  Preliminary Report (01 Dec 2019 11:01):    No growth to date.

## 2019-12-04 NOTE — DISCHARGE NOTE NURSING/CASE MANAGEMENT/SOCIAL WORK - PATIENT PORTAL LINK FT
You can access the FollowMyHealth Patient Portal offered by [facility] by registering at the following website: [email or web address]. By joining [facility]’s FollowMyHealth portal, you will also be able to view your health information using other applications (apps) compatible with our system.

## 2019-12-06 ENCOUNTER — INPATIENT (INPATIENT)
Facility: HOSPITAL | Age: 84
LOS: 10 days | Discharge: HOME | DRG: 884 | End: 2019-12-17
Attending: INTERNAL MEDICINE | Admitting: INTERNAL MEDICINE
Payer: MEDICARE

## 2019-12-06 VITALS
OXYGEN SATURATION: 100 % | DIASTOLIC BLOOD PRESSURE: 70 MMHG | SYSTOLIC BLOOD PRESSURE: 136 MMHG | HEIGHT: 70 IN | TEMPERATURE: 99 F | HEART RATE: 66 BPM | WEIGHT: 175.05 LBS | RESPIRATION RATE: 16 BRPM

## 2019-12-06 DIAGNOSIS — N39.0 URINARY TRACT INFECTION, SITE NOT SPECIFIED: ICD-10-CM

## 2019-12-06 DIAGNOSIS — R05 COUGH: ICD-10-CM

## 2019-12-06 DIAGNOSIS — F03.90 UNSPECIFIED DEMENTIA WITHOUT BEHAVIORAL DISTURBANCE: ICD-10-CM

## 2019-12-06 DIAGNOSIS — I10 ESSENTIAL (PRIMARY) HYPERTENSION: ICD-10-CM

## 2019-12-06 LAB
ALBUMIN SERPL ELPH-MCNC: 3.7 G/DL — SIGNIFICANT CHANGE UP (ref 3.3–5)
ALP SERPL-CCNC: 100 U/L — SIGNIFICANT CHANGE UP (ref 40–120)
ALT FLD-CCNC: 16 U/L — SIGNIFICANT CHANGE UP (ref 10–45)
ANION GAP SERPL CALC-SCNC: 15 MMOL/L — SIGNIFICANT CHANGE UP (ref 5–17)
APPEARANCE UR: ABNORMAL
AST SERPL-CCNC: 18 U/L — SIGNIFICANT CHANGE UP (ref 10–40)
BACTERIA # UR AUTO: NEGATIVE — SIGNIFICANT CHANGE UP
BASE EXCESS BLDV CALC-SCNC: 2.1 MMOL/L — HIGH (ref -2–2)
BASOPHILS # BLD AUTO: 0.05 K/UL — SIGNIFICANT CHANGE UP (ref 0–0.2)
BASOPHILS NFR BLD AUTO: 0.9 % — SIGNIFICANT CHANGE UP (ref 0–2)
BILIRUB SERPL-MCNC: 0.3 MG/DL — SIGNIFICANT CHANGE UP (ref 0.2–1.2)
BILIRUB UR-MCNC: NEGATIVE — SIGNIFICANT CHANGE UP
BUN SERPL-MCNC: 27 MG/DL — HIGH (ref 7–23)
CA-I SERPL-SCNC: 1.28 MMOL/L — SIGNIFICANT CHANGE UP (ref 1.12–1.3)
CALCIUM SERPL-MCNC: 9.9 MG/DL — SIGNIFICANT CHANGE UP (ref 8.4–10.5)
CHLORIDE BLDV-SCNC: 107 MMOL/L — SIGNIFICANT CHANGE UP (ref 96–108)
CHLORIDE SERPL-SCNC: 106 MMOL/L — SIGNIFICANT CHANGE UP (ref 96–108)
CO2 BLDV-SCNC: 29 MMOL/L — SIGNIFICANT CHANGE UP (ref 22–30)
CO2 SERPL-SCNC: 21 MMOL/L — LOW (ref 22–31)
COLOR SPEC: YELLOW — SIGNIFICANT CHANGE UP
CREAT SERPL-MCNC: 1.5 MG/DL — HIGH (ref 0.5–1.3)
DIFF PNL FLD: ABNORMAL
EOSINOPHIL # BLD AUTO: 0.04 K/UL — SIGNIFICANT CHANGE UP (ref 0–0.5)
EOSINOPHIL NFR BLD AUTO: 0.7 % — SIGNIFICANT CHANGE UP (ref 0–6)
EPI CELLS # UR: 0 /HPF — SIGNIFICANT CHANGE UP
GAS PNL BLDV: 145 MMOL/L — SIGNIFICANT CHANGE UP (ref 135–145)
GAS PNL BLDV: SIGNIFICANT CHANGE UP
GAS PNL BLDV: SIGNIFICANT CHANGE UP
GLUCOSE BLDV-MCNC: 86 MG/DL — SIGNIFICANT CHANGE UP (ref 70–99)
GLUCOSE SERPL-MCNC: 84 MG/DL — SIGNIFICANT CHANGE UP (ref 70–99)
GLUCOSE UR QL: NEGATIVE — SIGNIFICANT CHANGE UP
HCO3 BLDV-SCNC: 27 MMOL/L — SIGNIFICANT CHANGE UP (ref 21–29)
HCT VFR BLD CALC: 35.4 % — LOW (ref 39–50)
HCT VFR BLDA CALC: 34 % — LOW (ref 39–50)
HGB BLD CALC-MCNC: 11 G/DL — LOW (ref 13–17)
HGB BLD-MCNC: 11.5 G/DL — LOW (ref 13–17)
HYALINE CASTS # UR AUTO: 2 /LPF — SIGNIFICANT CHANGE UP (ref 0–2)
IMM GRANULOCYTES NFR BLD AUTO: 0.2 % — SIGNIFICANT CHANGE UP (ref 0–1.5)
KETONES UR-MCNC: NEGATIVE — SIGNIFICANT CHANGE UP
LACTATE BLDV-MCNC: 1.8 MMOL/L — SIGNIFICANT CHANGE UP (ref 0.7–2)
LEUKOCYTE ESTERASE UR-ACNC: ABNORMAL
LYMPHOCYTES # BLD AUTO: 1.27 K/UL — SIGNIFICANT CHANGE UP (ref 1–3.3)
LYMPHOCYTES # BLD AUTO: 21.7 % — SIGNIFICANT CHANGE UP (ref 13–44)
MCHC RBC-ENTMCNC: 29.3 PG — SIGNIFICANT CHANGE UP (ref 27–34)
MCHC RBC-ENTMCNC: 32.5 GM/DL — SIGNIFICANT CHANGE UP (ref 32–36)
MCV RBC AUTO: 90.3 FL — SIGNIFICANT CHANGE UP (ref 80–100)
MONOCYTES # BLD AUTO: 0.58 K/UL — SIGNIFICANT CHANGE UP (ref 0–0.9)
MONOCYTES NFR BLD AUTO: 9.9 % — SIGNIFICANT CHANGE UP (ref 2–14)
NEUTROPHILS # BLD AUTO: 3.89 K/UL — SIGNIFICANT CHANGE UP (ref 1.8–7.4)
NEUTROPHILS NFR BLD AUTO: 66.6 % — SIGNIFICANT CHANGE UP (ref 43–77)
NITRITE UR-MCNC: NEGATIVE — SIGNIFICANT CHANGE UP
NRBC # BLD: 0 /100 WBCS — SIGNIFICANT CHANGE UP (ref 0–0)
PCO2 BLDV: 47 MMHG — SIGNIFICANT CHANGE UP (ref 35–50)
PH BLDV: 7.38 — SIGNIFICANT CHANGE UP (ref 7.35–7.45)
PH UR: 5.5 — SIGNIFICANT CHANGE UP (ref 5–8)
PLATELET # BLD AUTO: 202 K/UL — SIGNIFICANT CHANGE UP (ref 150–400)
PO2 BLDV: 36 MMHG — SIGNIFICANT CHANGE UP (ref 25–45)
POTASSIUM BLDV-SCNC: 4.2 MMOL/L — SIGNIFICANT CHANGE UP (ref 3.5–5.3)
POTASSIUM SERPL-MCNC: 4.3 MMOL/L — SIGNIFICANT CHANGE UP (ref 3.5–5.3)
POTASSIUM SERPL-SCNC: 4.3 MMOL/L — SIGNIFICANT CHANGE UP (ref 3.5–5.3)
PROT SERPL-MCNC: 7.8 G/DL — SIGNIFICANT CHANGE UP (ref 6–8.3)
PROT UR-MCNC: ABNORMAL
RBC # BLD: 3.92 M/UL — LOW (ref 4.2–5.8)
RBC # FLD: 15.2 % — HIGH (ref 10.3–14.5)
RBC CASTS # UR COMP ASSIST: 4 /HPF — SIGNIFICANT CHANGE UP (ref 0–4)
SAO2 % BLDV: 66 % — LOW (ref 67–88)
SODIUM SERPL-SCNC: 142 MMOL/L — SIGNIFICANT CHANGE UP (ref 135–145)
SP GR SPEC: 1.02 — SIGNIFICANT CHANGE UP (ref 1.01–1.02)
UROBILINOGEN FLD QL: NEGATIVE — SIGNIFICANT CHANGE UP
WBC # BLD: 5.84 K/UL — SIGNIFICANT CHANGE UP (ref 3.8–10.5)
WBC # FLD AUTO: 5.84 K/UL — SIGNIFICANT CHANGE UP (ref 3.8–10.5)
WBC UR QL: 365 /HPF — HIGH (ref 0–5)

## 2019-12-06 PROCEDURE — 99291 CRITICAL CARE FIRST HOUR: CPT

## 2019-12-06 PROCEDURE — 71045 X-RAY EXAM CHEST 1 VIEW: CPT | Mod: 26

## 2019-12-06 RX ORDER — POLYETHYLENE GLYCOL 3350 17 G/17G
17 POWDER, FOR SOLUTION ORAL AT BEDTIME
Refills: 0 | Status: DISCONTINUED | OUTPATIENT
Start: 2019-12-06 | End: 2019-12-17

## 2019-12-06 RX ORDER — SODIUM CHLORIDE 9 MG/ML
2500 INJECTION INTRAMUSCULAR; INTRAVENOUS; SUBCUTANEOUS ONCE
Refills: 0 | Status: COMPLETED | OUTPATIENT
Start: 2019-12-06 | End: 2019-12-06

## 2019-12-06 RX ORDER — RIVASTIGMINE 4.6 MG/24H
1 PATCH, EXTENDED RELEASE TRANSDERMAL EVERY 24 HOURS
Refills: 0 | Status: DISCONTINUED | OUTPATIENT
Start: 2019-12-06 | End: 2019-12-17

## 2019-12-06 RX ORDER — PIPERACILLIN AND TAZOBACTAM 4; .5 G/20ML; G/20ML
3.38 INJECTION, POWDER, LYOPHILIZED, FOR SOLUTION INTRAVENOUS ONCE
Refills: 0 | Status: COMPLETED | OUTPATIENT
Start: 2019-12-06 | End: 2019-12-06

## 2019-12-06 RX ORDER — SODIUM CHLORIDE 9 MG/ML
1000 INJECTION, SOLUTION INTRAVENOUS
Refills: 0 | Status: DISCONTINUED | OUTPATIENT
Start: 2019-12-06 | End: 2019-12-17

## 2019-12-06 RX ORDER — ACETAMINOPHEN 500 MG
650 TABLET ORAL EVERY 4 HOURS
Refills: 0 | Status: DISCONTINUED | OUTPATIENT
Start: 2019-12-06 | End: 2019-12-13

## 2019-12-06 RX ORDER — VANCOMYCIN HCL 1 G
1000 VIAL (EA) INTRAVENOUS ONCE
Refills: 0 | Status: COMPLETED | OUTPATIENT
Start: 2019-12-06 | End: 2019-12-06

## 2019-12-06 RX ORDER — ERTAPENEM SODIUM 1 G/1
1000 INJECTION, POWDER, LYOPHILIZED, FOR SOLUTION INTRAMUSCULAR; INTRAVENOUS EVERY 24 HOURS
Refills: 0 | Status: DISCONTINUED | OUTPATIENT
Start: 2019-12-06 | End: 2019-12-09

## 2019-12-06 RX ORDER — TAMSULOSIN HYDROCHLORIDE 0.4 MG/1
0.4 CAPSULE ORAL AT BEDTIME
Refills: 0 | Status: DISCONTINUED | OUTPATIENT
Start: 2019-12-06 | End: 2019-12-17

## 2019-12-06 RX ORDER — AMLODIPINE BESYLATE 2.5 MG/1
10 TABLET ORAL DAILY
Refills: 0 | Status: DISCONTINUED | OUTPATIENT
Start: 2019-12-06 | End: 2019-12-17

## 2019-12-06 RX ORDER — FINASTERIDE 5 MG/1
5 TABLET, FILM COATED ORAL DAILY
Refills: 0 | Status: DISCONTINUED | OUTPATIENT
Start: 2019-12-06 | End: 2019-12-17

## 2019-12-06 RX ADMIN — PIPERACILLIN AND TAZOBACTAM 200 GRAM(S): 4; .5 INJECTION, POWDER, LYOPHILIZED, FOR SOLUTION INTRAVENOUS at 18:36

## 2019-12-06 RX ADMIN — SODIUM CHLORIDE 2500 MILLILITER(S): 9 INJECTION INTRAMUSCULAR; INTRAVENOUS; SUBCUTANEOUS at 18:37

## 2019-12-06 RX ADMIN — Medication 250 MILLIGRAM(S): at 20:08

## 2019-12-06 NOTE — H&P ADULT - NSHPREVIEWOFSYSTEMS_GEN_ALL_CORE
REVIEW OF SYSTEMS:  CONSTITUTIONAL: No fever, change in weight, or fatigue  HEAD: No headache, dizziness or recent trauma  EYES: No eye pain, visual disturbances, or discharge  ENT:  No difficulty hearing, tinnitus, vertigo, No sinus or throat pain  NECK: No pain or stiffness  BREASTS: No pain, masses, or nipple discharge  RESPIRATORY: No cough, wheezing, chills or hemoptysis, No shortness of breath at rest or exertional shortness of breath  CARDIOVASCULAR: No chest pain, palpitations, dizziness, CHF, arrhythmia, cardiomegaly or leg swelling  GASTROINTESTINAL: No abdominal or epigastric pain. No nausea, vomiting, or hematemesis, No diarrhea or constipation. No melena or hematochezia.  GENITOURINARY: No dysuria, frequency, hematuria, or incontinence  SKIN: No itching, burning, rashes, or lesions   LYMPH NODES: No history of enlarged glands  ENDOCRINE: No heat or cold intolerance, No hair loss. No osteoporosis or thyroid disease  MUSCULOSKELETAL: No joint pain or swelling, No muscle, back, or extremity pain  PSYCHIATRIC: No depression, anxiety, mood swings, or difficulty sleeping  HEME/LYMPH: No easy bruising, anticoagulants, bleeding disorder or bleeding gums  ALLERGY AND IMMUNOLOGIC: No hives or eczema  NEUROLOGICAL: + memory loss and confusion with episodes of unresponsiveness.

## 2019-12-06 NOTE — ED PROVIDER NOTE - ATTENDING CONTRIBUTION TO CARE
I, Boone Mai, performed a history and physical exam of the patient and discussed their management with the resident and /or advanced care provider. I reviewed the resident and /or ACP's note and agree with the documented findings and plan of care. I was present and available for all procedures.  Patient presents lethargic.  Patient. NSR, not hypoxic, and not hypotensive.  Patient recently discharged for UTI and will evaluate for sepsis secondary to UTI.  Patient signed out to Dr. Peter at 16:15 to reassess, f/u labs, imaging, and dispo accordingly.  Patient requires admission secondary ams.

## 2019-12-06 NOTE — ED PROVIDER NOTE - OBJECTIVE STATEMENT
89 year old male with pmhx AFib, UTI, dementia, hypercholesteremia, HTN, CAD and pshx PCI x2, hernia repair presents to the ED s/p recent discharge from [place] x2 days ago for UTI. Pt was sent in from nursing home for abnormal labs and lethargy. Reported that since yesterday, pt was more lethargic. Today, the pt had labs conducted at the nursing home, with unspecified abnormal lab values. Subsequently referred to [facility] ED, found to be hypoxic by EMS to spO2 90%, placed on 3L NC, responsive to voice. Per nursing home, pt is usually able to have a conversation and is alert. Denies fever. Upon review of lab results brought by EMS, only abnormality on CBC and CMP is a hemoglobin value of 10.9.

## 2019-12-06 NOTE — H&P ADULT - HISTORY OF PRESENT ILLNESS
89 year old male with pmhx AFib, UTI, dementia, hypercholesteremia, HTN, CAD and pshx PCI x2, hernia repair presents to the ED s/p recent discharge from [place] x2 days ago for UTI. Pt was sent in from nursing home for abnormal labs and lethargy. Reported that since yesterday, pt was more lethargic. Today, the pt had labs conducted at the nursing home, with unspecified abnormal lab values. Subsequently referred to [facility] ED, found to be hypoxic by EMS to spO2 90%, placed on 3L NC, responsive to voice. Per nursing home, pt is usually able to have a conversation and is alert. Denies fever. Upon review of lab results brought by EMS, only abnormality on CBC and CMP is a hemoglobin value of 10.9. Patient with a hx of worsening multi infarct dementia. Has had multiple episode of waxing and waning mental status

## 2019-12-06 NOTE — ED PROVIDER NOTE - PHYSICAL EXAMINATION
General: Lethargic, responding only to sternal rub and any movement.   Heart: Regular rate and rhythm. No murmurs, rubs, or gallops.  Lungs: Crackles and rhonchi bilaterally with coarse breath sounds.   Abdomen: Soft, non-tender, non-distended. No organomegaly.  Eyes: PERRL, EOMI.  Neuro: AAOx4, no focal motor or sensory deficits. CN II-XII intact.  Extremities: No lower extremity swelling, 2+ DP and radial pulses.  Skin: No rashes or lesions.

## 2019-12-06 NOTE — H&P ADULT - NSHPLABSRESULTS_GEN_ALL_CORE
11.5   5.84  )-----------( 202      ( 06 Dec 2019 17:42 )             35.4           142  |  106  |  27<H>  ----------------------------<  84  4.3   |  21<L>  |  1.50<H>    Ca    9.9      06 Dec 2019 17:42    TPro  7.8  /  Alb  3.7  /  TBili  0.3  /  DBili  x   /  AST  18  /  ALT  16  /  AlkPhos  100                Urinalysis Basic - ( 06 Dec 2019 18:56 )    Color: Yellow / Appearance: Slightly Turbid / S.022 / pH: x  Gluc: x / Ketone: Negative  / Bili: Negative / Urobili: Negative   Blood: x / Protein: 30 mg/dL / Nitrite: Negative   Leuk Esterase: Large / RBC: 4 /hpf /  /HPF   Sq Epi: x / Non Sq Epi: 0 /hpf / Bacteria: Negative            Lactate Trend            CAPILLARY BLOOD GLUCOSE            Culture Results:   50,000 - 99,000 CFU/mL Proteus mirabilis ESBL ( @ 23:12)  Culture Results:   No growth at 5 days. ( @ 10:18)  Culture Results:   No growth at 5 days. ( @ 10:18)  Culture Results:   No growth at 5 days. ( @ 22:09)  Culture Results:   No growth ( @ 21:45)  Culture Results:   >=3 organisms. Probable collection contamination. ( @ 22:26)  Culture Results:   No growth at 5 days. ( @ 22:18)  Culture Results:   No growth at 5 days. ( @ 22:18)

## 2019-12-06 NOTE — H&P ADULT - NSHPPHYSICALEXAM_GEN_ALL_CORE
PHYSICAL EXAM:  GENERAL: NAD, well nourished  HEAD:  Atraumatic  EYES: EOM, PERRLA, conjunctiva pink and sclera white  ENT: No tonsillar erythema, exudates, or enlargement, moist mucous membranes, good dentition, no lesions  NECK: Supple, No JVD, normal thyroid, carotids with normal upstrokes and no bruits  CHEST/LUNG: Clear to auscultation bilaterally, No rales, rhonchi, wheezing, or rubs  HEART: Regular rate and rhythm, No murmurs, rubs, or gallops  ABDOMEN: Soft, nondistended, no masses, guarding, tenderness or rebound, bowel sounds present  EXTREMITIES:  2+ Peripheral Pulses, No clubbing, cyanosis, or edema.   LYMPH: No lymphadenopathy noted  SKIN: No rashes or lesions  NERVOUS SYSTEM:  confused minimally responsive

## 2019-12-06 NOTE — ED ADULT NURSE REASSESSMENT NOTE - NS ED NURSE REASSESS COMMENT FT1
straight cath done with 2 RNs present, +sterile equipment, +aseptic technique, +mamadou well, drained 300 ml dark yellow urine
Report received from change of shift RN Levy. Patient resting comfortably in bed, VSS, slow to respond, but is oriented to person, place, and time when woken up and prompted. Pt. on 2L NC with Oxygen saturation 100%. VSS. Bed locked and in lowest position. Comfort and safety provided.

## 2019-12-06 NOTE — H&P ADULT - PROBLEM SELECTOR PLAN 1
will continue excelon patch  reorient pt as needed  unclear if worsening mental status is due to underlying

## 2019-12-06 NOTE — ED ADULT NURSE NOTE - OBJECTIVE STATEMENT
89 year old male with pmhx AFib, UTI, dementia, hypercholesteremia, HTN, CAD and pshx PCI x2, hernia repair presents to the ED s/p recent discharge from [place] x2 days ago for UTI. Pt was sent in from nursing home for abnormal labs and lethargy. Reported that since yesterday, pt was more lethargic. Today, the pt had labs conducted at the nursing home, with unspecified abnormal lab values. Subsequently referred to [facility] ED, found to be hypoxic by EMS to spO2 90%, placed on 3L NC, responsive to voice. Per nursing home, pt is usually able to have a conversation and is alert. Denies fever. Upon review of lab results brought by EMS, only abnormality on CBC and CMP is a hemoglobin value of 10.9

## 2019-12-06 NOTE — ED PROVIDER NOTE - CLINICAL SUMMARY MEDICAL DECISION MAKING FREE TEXT BOX
89 year old male with pmhx AFib, UTI, dementia, hypercholesteremia, HTN, CAD and pshx PCI x2, hernia repair presents to the ED s/p recent discharge from [place] x2 days ago for UTI. Pt coughing here, lethargic. Concern for sepsis secondary to PNA given exam. Will obtain blood cultures, basic labs, urine and admit. Upon review of lab results brought by EMS, only abnormality on CBC and CMP is a hemoglobin of 10.9. Will repeat labs here then re-evaluate. No concern for active bleeding at this time.

## 2019-12-07 LAB
ANION GAP SERPL CALC-SCNC: 13 MMOL/L — SIGNIFICANT CHANGE UP (ref 5–17)
BUN SERPL-MCNC: 18 MG/DL — SIGNIFICANT CHANGE UP (ref 7–23)
CALCIUM SERPL-MCNC: 8.4 MG/DL — SIGNIFICANT CHANGE UP (ref 8.4–10.5)
CHLORIDE SERPL-SCNC: 107 MMOL/L — SIGNIFICANT CHANGE UP (ref 96–108)
CO2 SERPL-SCNC: 23 MMOL/L — SIGNIFICANT CHANGE UP (ref 22–31)
CREAT SERPL-MCNC: 1.22 MG/DL — SIGNIFICANT CHANGE UP (ref 0.5–1.3)
CULTURE RESULTS: NO GROWTH — SIGNIFICANT CHANGE UP
GLUCOSE BLDC GLUCOMTR-MCNC: 110 MG/DL — HIGH (ref 70–99)
GLUCOSE BLDC GLUCOMTR-MCNC: 81 MG/DL — SIGNIFICANT CHANGE UP (ref 70–99)
GLUCOSE SERPL-MCNC: 356 MG/DL — HIGH (ref 70–99)
HCT VFR BLD CALC: 33.2 % — LOW (ref 39–50)
HGB BLD-MCNC: 10.8 G/DL — LOW (ref 13–17)
MCHC RBC-ENTMCNC: 29.3 PG — SIGNIFICANT CHANGE UP (ref 27–34)
MCHC RBC-ENTMCNC: 32.5 GM/DL — SIGNIFICANT CHANGE UP (ref 32–36)
MCV RBC AUTO: 90.2 FL — SIGNIFICANT CHANGE UP (ref 80–100)
NRBC # BLD: 0 /100 WBCS — SIGNIFICANT CHANGE UP (ref 0–0)
PLATELET # BLD AUTO: 170 K/UL — SIGNIFICANT CHANGE UP (ref 150–400)
POTASSIUM SERPL-MCNC: 3.8 MMOL/L — SIGNIFICANT CHANGE UP (ref 3.5–5.3)
POTASSIUM SERPL-SCNC: 3.8 MMOL/L — SIGNIFICANT CHANGE UP (ref 3.5–5.3)
RBC # BLD: 3.68 M/UL — LOW (ref 4.2–5.8)
RBC # FLD: 14.9 % — HIGH (ref 10.3–14.5)
SODIUM SERPL-SCNC: 143 MMOL/L — SIGNIFICANT CHANGE UP (ref 135–145)
SPECIMEN SOURCE: SIGNIFICANT CHANGE UP
WBC # BLD: 7.53 K/UL — SIGNIFICANT CHANGE UP (ref 3.8–10.5)
WBC # FLD AUTO: 7.53 K/UL — SIGNIFICANT CHANGE UP (ref 3.8–10.5)

## 2019-12-07 RX ADMIN — ERTAPENEM SODIUM 120 MILLIGRAM(S): 1 INJECTION, POWDER, LYOPHILIZED, FOR SOLUTION INTRAMUSCULAR; INTRAVENOUS at 01:42

## 2019-12-07 RX ADMIN — RIVASTIGMINE 1 PATCH: 4.6 PATCH, EXTENDED RELEASE TRANSDERMAL at 21:09

## 2019-12-07 RX ADMIN — RIVASTIGMINE 1 PATCH: 4.6 PATCH, EXTENDED RELEASE TRANSDERMAL at 08:50

## 2019-12-07 RX ADMIN — RIVASTIGMINE 1 PATCH: 4.6 PATCH, EXTENDED RELEASE TRANSDERMAL at 05:28

## 2019-12-07 NOTE — PROGRESS NOTE ADULT - PROBLEM SELECTOR PLAN 1
continue ertapenem   will continue flomax and finesteride  no leukocytosis  follow surveillance cultures

## 2019-12-07 NOTE — PROGRESS NOTE ADULT - SUBJECTIVE AND OBJECTIVE BOX
89 year old male with pmhx AFib, UTI, dementia, hypercholesteremia, HTN, CAD and pshx PCI x2, hernia repair presents to the ED s/p recent discharge from [place] x2 days ago for UTI. Pt was sent in from nursing home for abnormal labs and lethargy. Reported that since yesterday, pt was more lethargic. Today, the pt had labs conducted at the nursing home, with unspecified abnormal lab values. Subsequently referred to [facility] ED, found to be hypoxic by EMS to spO2 90%, placed on 3L NC, responsive to voice. Per nursing home, pt is usually able to have a conversation and is alert. Denies fever. Upon review of lab results brought by EMS, only abnormality on CBC and CMP is a hemoglobin value of 10.9. Patient with a hx of worsening multi infarct dementia. Has had multiple episode of waxing and waning mental status.     MEDICATIONS  (STANDING):  amLODIPine   Tablet 10 milliGRAM(s) Oral daily  dextrose 5% + sodium chloride 0.45%. 1000 milliLiter(s) (50 mL/Hr) IV Continuous <Continuous>  ertapenem  IVPB 1000 milliGRAM(s) IV Intermittent every 24 hours  finasteride 5 milliGRAM(s) Oral daily  rivastigmine patch  9.5 mG/24 Hr(s) 1 Patch Transdermal every 24 hours  tamsulosin 0.4 milliGRAM(s) Oral at bedtime    MEDICATIONS  (PRN):  acetaminophen    Suspension .. 650 milliGRAM(s) Oral every 4 hours PRN Temp greater or equal to 38C (100.4F), Moderate Pain (4 - 6)  polyethylene glycol 3350 17 Gram(s) Oral at bedtime PRN Constipation          VITALS:   T(C): 37.1 (19 @ 12:05), Max: 42 (19 @ 00:30)  HR: 69 (19 @ 12:05) (50 - 69)  BP: 124/75 (19 @ 12:05) (120/75 - 131/72)  RR: 16 (19 @ 12:05) (16 - 16)  SpO2: 95% (19 @ 12:05) (95% - 100%)  Wt(kg): --    PHYSICAL EXAM:  	GENERAL: NAD, well nourished  	HEAD:  Atraumatic  	EYES: EOM, PERRLA, conjunctiva pink and sclera white  	ENT: No tonsillar erythema, exudates, or enlargement, moist mucous membranes, good dentition, no lesions  	NECK: Supple, No JVD, normal thyroid, carotids with normal upstrokes and no bruits  	CHEST/LUNG: Clear to auscultation bilaterally, No rales, rhonchi, wheezing, or rubs  	HEART: Regular rate and rhythm, No murmurs, rubs, or gallops  	ABDOMEN: Soft, nondistended, no masses, guarding, tenderness or rebound, bowel sounds present  	EXTREMITIES:  2+ Peripheral Pulses, No clubbing, cyanosis, or edema.   	LYMPH: No lymphadenopathy noted  	SKIN: No rashes or lesions  NERVOUS SYSTEM:  confused minimally responsive    LABS:        CBC Full  -  ( 06 Dec 2019 17:42 )  WBC Count : 5.84 K/uL  RBC Count : 3.92 M/uL  Hemoglobin : 11.5 g/dL  Hematocrit : 35.4 %  Platelet Count - Automated : 202 K/uL  Mean Cell Volume : 90.3 fl  Mean Cell Hemoglobin : 29.3 pg  Mean Cell Hemoglobin Concentration : 32.5 gm/dL  Auto Neutrophil # : 3.89 K/uL  Auto Lymphocyte # : 1.27 K/uL  Auto Monocyte # : 0.58 K/uL  Auto Eosinophil # : 0.04 K/uL  Auto Basophil # : 0.05 K/uL  Auto Neutrophil % : 66.6 %  Auto Lymphocyte % : 21.7 %  Auto Monocyte % : 9.9 %  Auto Eosinophil % : 0.7 %  Auto Basophil % : 0.9 %        142  |  106  |  27<H>  ----------------------------<  84  4.3   |  21<L>  |  1.50<H>    Ca    9.9      06 Dec 2019 17:42    TPro  7.8  /  Alb  3.7  /  TBili  0.3  /  DBili  x   /  AST  18  /  ALT  16  /  AlkPhos  100      LIVER FUNCTIONS - ( 06 Dec 2019 17:42 )  Alb: 3.7 g/dL / Pro: 7.8 g/dL / ALK PHOS: 100 U/L / ALT: 16 U/L / AST: 18 U/L / GGT: x             Urinalysis Basic - ( 06 Dec 2019 18:56 )    Color: Yellow / Appearance: Slightly Turbid / S.022 / pH: x  Gluc: x / Ketone: Negative  / Bili: Negative / Urobili: Negative   Blood: x / Protein: 30 mg/dL / Nitrite: Negative   Leuk Esterase: Large / RBC: 4 /hpf /  /HPF   Sq Epi: x / Non Sq Epi: 0 /hpf / Bacteria: Negative      CAPILLARY BLOOD GLUCOSE      POCT Blood Glucose.: 110 mg/dL (07 Dec 2019 17:00)  POCT Blood Glucose.: 81 mg/dL (07 Dec 2019 07:58)      RADIOLOGY & ADDITIONAL TESTS:

## 2019-12-08 LAB
ANION GAP SERPL CALC-SCNC: 13 MMOL/L — SIGNIFICANT CHANGE UP (ref 5–17)
APTT BLD: 32.3 SEC — SIGNIFICANT CHANGE UP (ref 27.5–36.3)
BUN SERPL-MCNC: 18 MG/DL — SIGNIFICANT CHANGE UP (ref 7–23)
CALCIUM SERPL-MCNC: 8.7 MG/DL — SIGNIFICANT CHANGE UP (ref 8.4–10.5)
CHLORIDE SERPL-SCNC: 108 MMOL/L — SIGNIFICANT CHANGE UP (ref 96–108)
CO2 SERPL-SCNC: 21 MMOL/L — LOW (ref 22–31)
CREAT SERPL-MCNC: 1.05 MG/DL — SIGNIFICANT CHANGE UP (ref 0.5–1.3)
GLUCOSE BLDC GLUCOMTR-MCNC: 101 MG/DL — HIGH (ref 70–99)
GLUCOSE BLDC GLUCOMTR-MCNC: 113 MG/DL — HIGH (ref 70–99)
GLUCOSE SERPL-MCNC: 260 MG/DL — HIGH (ref 70–99)
HCT VFR BLD CALC: 30.8 % — LOW (ref 39–50)
HGB BLD-MCNC: 9.8 G/DL — LOW (ref 13–17)
INR BLD: 1.29 RATIO — HIGH (ref 0.88–1.16)
MCHC RBC-ENTMCNC: 29.3 PG — SIGNIFICANT CHANGE UP (ref 27–34)
MCHC RBC-ENTMCNC: 31.8 GM/DL — LOW (ref 32–36)
MCV RBC AUTO: 91.9 FL — SIGNIFICANT CHANGE UP (ref 80–100)
PLATELET # BLD AUTO: 163 K/UL — SIGNIFICANT CHANGE UP (ref 150–400)
POTASSIUM SERPL-MCNC: 3.7 MMOL/L — SIGNIFICANT CHANGE UP (ref 3.5–5.3)
POTASSIUM SERPL-SCNC: 3.7 MMOL/L — SIGNIFICANT CHANGE UP (ref 3.5–5.3)
PROTHROM AB SERPL-ACNC: 15 SEC — HIGH (ref 10–13.1)
RBC # BLD: 3.35 M/UL — LOW (ref 4.2–5.8)
RBC # FLD: 15.2 % — HIGH (ref 10.3–14.5)
SODIUM SERPL-SCNC: 142 MMOL/L — SIGNIFICANT CHANGE UP (ref 135–145)
WBC # BLD: 6.66 K/UL — SIGNIFICANT CHANGE UP (ref 3.8–10.5)
WBC # FLD AUTO: 6.66 K/UL — SIGNIFICANT CHANGE UP (ref 3.8–10.5)

## 2019-12-08 PROCEDURE — 71045 X-RAY EXAM CHEST 1 VIEW: CPT | Mod: 26

## 2019-12-08 PROCEDURE — 99232 SBSQ HOSP IP/OBS MODERATE 35: CPT

## 2019-12-08 RX ORDER — IPRATROPIUM/ALBUTEROL SULFATE 18-103MCG
3 AEROSOL WITH ADAPTER (GRAM) INHALATION EVERY 6 HOURS
Refills: 0 | Status: DISCONTINUED | OUTPATIENT
Start: 2019-12-08 | End: 2019-12-17

## 2019-12-08 RX ADMIN — RIVASTIGMINE 1 PATCH: 4.6 PATCH, EXTENDED RELEASE TRANSDERMAL at 05:28

## 2019-12-08 RX ADMIN — Medication 3 MILLILITER(S): at 21:45

## 2019-12-08 RX ADMIN — ERTAPENEM SODIUM 120 MILLIGRAM(S): 1 INJECTION, POWDER, LYOPHILIZED, FOR SOLUTION INTRAMUSCULAR; INTRAVENOUS at 01:05

## 2019-12-08 RX ADMIN — RIVASTIGMINE 1 PATCH: 4.6 PATCH, EXTENDED RELEASE TRANSDERMAL at 08:10

## 2019-12-08 RX ADMIN — RIVASTIGMINE 1 PATCH: 4.6 PATCH, EXTENDED RELEASE TRANSDERMAL at 05:29

## 2019-12-08 RX ADMIN — RIVASTIGMINE 1 PATCH: 4.6 PATCH, EXTENDED RELEASE TRANSDERMAL at 19:18

## 2019-12-08 NOTE — PROGRESS NOTE ADULT - ASSESSMENT
89 yo male recently admitted to [facility] for hematuria presents with a change in mental status. Patient found to be very lethargic and unable to ambulate. In ED Patient was found to have a + UA with suggested Urinary tract infection. Patient very lethargic yesterday.  Alert and responsive today. Patient with waxing and waning mental status. Very alert earlier in the past and now  minimally responsive .  Will get ammonia level, abg and EEG to r/o absence seizure  with unarousability. This am not alert and barely arousable.    The patient was hypothermic last night and required warming blankets. 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

## 2019-12-08 NOTE — PROGRESS NOTE ADULT - PROBLEM SELECTOR PLAN 1
continue ertapenem   will continue Flomax and finasteride  appreciate input from urology   follow surveillance cultures

## 2019-12-08 NOTE — PROGRESS NOTE ADULT - PROBLEM SELECTOR PLAN 2
MS continues to wax and wane  possibly due to underlying dementia  will continue to monitor mental status

## 2019-12-08 NOTE — CONSULT NOTE ADULT - SUBJECTIVE AND OBJECTIVE BOX
Patient is a 89y old  Male who presents with a chief complaint of change in mental status (07 Dec 2019 20:58)      HPI:  89 year old male with pmhx AFib, UTI, dementia, hypercholesteremia, HTN, CAD and pshx PCI x2, hernia repair presents to the ED s/p recent discharge from [place] x2 days ago for UTI. Pt was sent in from nursing home for abnormal labs and lethargy. Reported that since yesterday, pt was more lethargic. Today, the pt had labs conducted at the nursing home, with unspecified abnormal lab values. Subsequently referred to [facility] ED, found to be hypoxic by EMS to spO2 90%, placed on 3L NC, responsive to voice. Per nursing home, pt is usually able to have a conversation and is alert. Denies fever. Upon review of lab results brought by EMS, only abnormality on CBC and CMP is a hemoglobin value of 10.9. Patient with a hx of worsening multi infarct dementia. Has had multiple episode of waxing and waning mental status (06 Dec 2019 21:16)    Patient recently treated for Ertapenem. Dementia likely mostly from multiinfarct dementia as opposed to UTI.      PAST MEDICAL & SURGICAL HISTORY:  Atrial fibrillation  UTI (urinary tract infection): MDR E.coli  Back pain  Fall  Dementia  Hypercholesteremia  HTN - Hypertension  CAD (Coronary Artery Disease)  S/P percutaneous transluminal coronary angioplasty: PCI: stent x2  h/o Hernia Repair      REVIEW OF SYSTEMS:    CONSTITUTIONAL: No weakness, fevers or chills  HEENT: No visual changes;  No vertigo or throat pain   ENDO: No sweating, no palpitations  NECK: No pain or stiffness  MUSCULOSKELETAL: No back pain, no joint pain  RESPIRATORY: No cough, wheezing, hemoptysis; No shortness of breath  CARDIOVASCULAR: No chest pain or palpitations  GASTROINTESTINAL: No abdominal or epigastric pain. No nausea, vomiting, or hematemesis; No diarrhea or constipation. No melena or hematochezia.  NEUROLOGICAL: No numbness or weakness  PSYCH: No depression, no mood changes  SKIN: No itching, burning, rashes, or lesions   All other review of systems is negative unless indicated above.    MEDICATIONS  (STANDING):  amLODIPine   Tablet 10 milliGRAM(s) Oral daily  dextrose 5% + sodium chloride 0.45%. 1000 milliLiter(s) (50 mL/Hr) IV Continuous <Continuous>  ertapenem  IVPB 1000 milliGRAM(s) IV Intermittent every 24 hours  finasteride 5 milliGRAM(s) Oral daily  rivastigmine patch  9.5 mG/24 Hr(s) 1 Patch Transdermal every 24 hours  tamsulosin 0.4 milliGRAM(s) Oral at bedtime    MEDICATIONS  (PRN):  acetaminophen    Suspension .. 650 milliGRAM(s) Oral every 4 hours PRN Temp greater or equal to 38C (100.4F), Moderate Pain (4 - 6)  albuterol/ipratropium for Nebulization 3 milliLiter(s) Nebulizer every 6 hours PRN Shortness of Breath and/or Wheezing  polyethylene glycol 3350 17 Gram(s) Oral at bedtime PRN Constipation      Allergies    No Known Allergies    Intolerances    morphine (Unknown)      SOCIAL HISTORY: No illicit drug use    FAMILY HISTORY:  No pertinent family history in first degree relatives      Vital Signs Last 24 Hrs  T(C): 36.3 (08 Dec 2019 08:45), Max: 37.1 (07 Dec 2019 12:05)  T(F): 97.3 (08 Dec 2019 08:45), Max: 98.7 (07 Dec 2019 12:05)  HR: 57 (08 Dec 2019 08:45) (57 - 69)  BP: 134/80 (08 Dec 2019 08:45) (124/75 - 146/68)  BP(mean): --  RR: 16 (08 Dec 2019 08:45) (16 - 18)  SpO2: 95% (08 Dec 2019 08:45) (95% - 96%)    PHYSICAL EXAM:    Constitutional: NAD, sleeping  HEENT: TOÑO, EOMI, Normal Hearing, MMM  Neck: No JVD  Back: No CVA tenderness  Respiratory: No accessory respiratory muscle use  Abd: Soft, NT/ND  : nl external genitalia  CHATO: Deferred  Extremities: No calf tenderness  Neurological: A/O x 3, no focal deficits  Psychiatric: Normal mood, normal affect  Skin: No rashes    I&O's Summary    07 Dec 2019 07:01  -  08 Dec 2019 07:00  --------------------------------------------------------  IN: 650 mL / OUT: 0 mL / NET: 650 mL        LABS:                        9.8    6.66  )-----------( 163      ( 08 Dec 2019 09:21 )             30.8         142  |  108  |  18  ----------------------------<  260<H>  3.7   |  21<L>  |  1.05    Ca    8.7      08 Dec 2019 05:24    TPro  7.8  /  Alb  3.7  /  TBili  0.3  /  DBili  x   /  AST  18  /  ALT  16  /  AlkPhos  100  12    PT/INR - ( 08 Dec 2019 09:08 )   PT: 15.0 sec;   INR: 1.29 ratio         PTT - ( 08 Dec 2019 09:08 )  PTT:32.3 sec  Urinalysis Basic - ( 06 Dec 2019 18:56 )    Color: Yellow / Appearance: Slightly Turbid / S.022 / pH: x  Gluc: x / Ketone: Negative  / Bili: Negative / Urobili: Negative   Blood: x / Protein: 30 mg/dL / Nitrite: Negative   Leuk Esterase: Large / RBC: 4 /hpf /  /HPF   Sq Epi: x / Non Sq Epi: 0 /hpf / Bacteria: Negative      Urine Culture:  negative, prior Proteus UTI successfully treated with Ertapenem.    RADIOLOGY & ADDITIONAL STUDIES:      No urological intervention required at this time.  Recommend measurement of post void residual.      Covering for Dr. Valencia.      Office: [phone]

## 2019-12-08 NOTE — CHART NOTE - NSCHARTNOTEFT_GEN_A_CORE
MEDICINE NP    DANIA CHRISTINE  89y Male    Patient is a 89y old  Male who presents with a chief complaint of change in mental status (07 Dec 2019 20:58)       > Event Summary:  Notified by RN, Patient with noted with chest congestion status post oropharyngeal suction with +blood tinged output   Patient seen at bedside, Asleep, easily awakened. NAD.  Respiration even and unlabored.  Lungs CTA anteriorly          -Vital Signs Last 24 Hrs  T(C): 36.3 (08 Dec 2019 04:29), Max: 37.1 (07 Dec 2019 12:05)  T(F): 97.3 (08 Dec 2019 04:29), Max: 98.7 (07 Dec 2019 12:05)  HR: 57 (08 Dec 2019 04:29) (57 - 69)  BP: 136/79 (08 Dec 2019 04:29) (121/75 - 146/68)  RR: 18 (08 Dec 2019 04:29) (16 - 18)  SpO2: 95% (08 Dec 2019 04:29) (95% - 96%)      >Assessment & Plan:  HPI:  89 year old male with pmhx AFib (no AC), UTI, Dementia, hypercholesteremia, HTN, CAD and pshx PCI x2, hernia repair.  Presents to the ED s/p recent discharge from [place] x2 days ago for UTI. Pt was sent in from nursing home for abnormal labs and lethargy.  Admitted with AMS, Sepsis/ UTI.  Now with concern for Hemoptysis.    1) Hemoptysis and Chest Congestion : concerning for Aspiration   -Hemoptysis ?2/2 trauma vs AC side Effect   -f/u CXR  -f/u CBC, INR, aPTT  -C/w NPO  -Aspiration precautions  -HOB elevated  -Will endorse to day team   -Attending to follow    LEONARD Rivera-BC  Medicine Department

## 2019-12-09 LAB
ANION GAP SERPL CALC-SCNC: 12 MMOL/L — SIGNIFICANT CHANGE UP (ref 5–17)
BUN SERPL-MCNC: 16 MG/DL — SIGNIFICANT CHANGE UP (ref 7–23)
CALCIUM SERPL-MCNC: 8.8 MG/DL — SIGNIFICANT CHANGE UP (ref 8.4–10.5)
CHLORIDE SERPL-SCNC: 108 MMOL/L — SIGNIFICANT CHANGE UP (ref 96–108)
CO2 SERPL-SCNC: 22 MMOL/L — SIGNIFICANT CHANGE UP (ref 22–31)
CREAT SERPL-MCNC: 0.89 MG/DL — SIGNIFICANT CHANGE UP (ref 0.5–1.3)
GLUCOSE SERPL-MCNC: 295 MG/DL — HIGH (ref 70–99)
HCT VFR BLD CALC: 31.8 % — LOW (ref 39–50)
HGB BLD-MCNC: 10.2 G/DL — LOW (ref 13–17)
MCHC RBC-ENTMCNC: 29.2 PG — SIGNIFICANT CHANGE UP (ref 27–34)
MCHC RBC-ENTMCNC: 32.1 GM/DL — SIGNIFICANT CHANGE UP (ref 32–36)
MCV RBC AUTO: 91.1 FL — SIGNIFICANT CHANGE UP (ref 80–100)
PLATELET # BLD AUTO: 162 K/UL — SIGNIFICANT CHANGE UP (ref 150–400)
POTASSIUM SERPL-MCNC: 3.8 MMOL/L — SIGNIFICANT CHANGE UP (ref 3.5–5.3)
POTASSIUM SERPL-SCNC: 3.8 MMOL/L — SIGNIFICANT CHANGE UP (ref 3.5–5.3)
RBC # BLD: 3.49 M/UL — LOW (ref 4.2–5.8)
RBC # FLD: 15 % — HIGH (ref 10.3–14.5)
SODIUM SERPL-SCNC: 142 MMOL/L — SIGNIFICANT CHANGE UP (ref 135–145)
WBC # BLD: 7.89 K/UL — SIGNIFICANT CHANGE UP (ref 3.8–10.5)
WBC # FLD AUTO: 7.89 K/UL — SIGNIFICANT CHANGE UP (ref 3.8–10.5)

## 2019-12-09 RX ADMIN — SODIUM CHLORIDE 50 MILLILITER(S): 9 INJECTION, SOLUTION INTRAVENOUS at 11:38

## 2019-12-09 RX ADMIN — RIVASTIGMINE 1 PATCH: 4.6 PATCH, EXTENDED RELEASE TRANSDERMAL at 07:31

## 2019-12-09 RX ADMIN — RIVASTIGMINE 1 PATCH: 4.6 PATCH, EXTENDED RELEASE TRANSDERMAL at 06:01

## 2019-12-09 RX ADMIN — RIVASTIGMINE 1 PATCH: 4.6 PATCH, EXTENDED RELEASE TRANSDERMAL at 19:00

## 2019-12-09 RX ADMIN — ERTAPENEM SODIUM 120 MILLIGRAM(S): 1 INJECTION, POWDER, LYOPHILIZED, FOR SOLUTION INTRAMUSCULAR; INTRAVENOUS at 00:40

## 2019-12-09 RX ADMIN — RIVASTIGMINE 1 PATCH: 4.6 PATCH, EXTENDED RELEASE TRANSDERMAL at 06:00

## 2019-12-09 NOTE — PROGRESS NOTE ADULT - SUBJECTIVE AND OBJECTIVE BOX
89 year old male with pmhx AFib, UTI, dementia, hypercholesteremia, HTN, CAD and pshx PCI x2, hernia repair presents to the ED s/p recent discharge from [place] x2 days ago for UTI. Pt was sent in from nursing home for abnormal labs and lethargy. Reported that since yesterday, pt was more lethargic. Today, the pt had labs conducted at the nursing home, with unspecified abnormal lab values. Subsequently referred to [facility] ED, found to be hypoxic by EMS to spO2 90%, placed on 3L NC, responsive to voice. Per nursing home, pt is usually able to have a conversation and is alert. Denies fever. Upon review of lab results brought by EMS, only abnormality on CBC and CMP is a hemoglobin value of 10.9. Patient with a hx of worsening multi infarct dementia. Has had multiple episode of waxing and waning mental status Pt still very lethargic      MEDICATIONS  (STANDING):  amLODIPine   Tablet 10 milliGRAM(s) Oral daily  dextrose 5% + sodium chloride 0.45%. 1000 milliLiter(s) (50 mL/Hr) IV Continuous <Continuous>  finasteride 5 milliGRAM(s) Oral daily  rivastigmine patch  9.5 mG/24 Hr(s) 1 Patch Transdermal every 24 hours  tamsulosin 0.4 milliGRAM(s) Oral at bedtime    MEDICATIONS  (PRN):  acetaminophen    Suspension .. 650 milliGRAM(s) Oral every 4 hours PRN Temp greater or equal to 38C (100.4F), Moderate Pain (4 - 6)  albuterol/ipratropium for Nebulization 3 milliLiter(s) Nebulizer every 6 hours PRN Shortness of Breath and/or Wheezing  polyethylene glycol 3350 17 Gram(s) Oral at bedtime PRN Constipation          VITALS:   T(C): 36.3 (12-09-19 @ 12:04), Max: 36.9 (12-09-19 @ 09:18)  HR: 60 (12-09-19 @ 12:04) (59 - 74)  BP: 106/66 (12-09-19 @ 12:04) (106/66 - 146/83)  RR: 18 (12-09-19 @ 12:04) (18 - 18)  SpO2: 95% (12-09-19 @ 12:04) (94% - 96%)  Wt(kg): --    PHYSICAL EXAM:  	GENERAL: NAD, well nourished  	HEAD:  Atraumatic  	EYES: EOM, PERRLA, conjunctiva pink and sclera white  	ENT: No tonsillar erythema, exudates, or enlargement, moist mucous membranes, good dentition, no lesions  	NECK: Supple, No JVD, normal thyroid, carotids with normal upstrokes and no bruits  	CHEST/LUNG: Clear to auscultation bilaterally, No rales, rhonchi, wheezing, or rubs  	HEART: Regular rate and rhythm, No murmurs, rubs, or gallops  	ABDOMEN: Soft, nondistended, no masses, guarding, tenderness or rebound, bowel sounds present  	EXTREMITIES:  2+ Peripheral Pulses, No clubbing, cyanosis, or edema.   	LYMPH: No lymphadenopathy noted  	SKIN: No rashes or lesions  NERVOUS SYSTEM:  confused minimally responsive    LABS:        CBC Full  -  ( 09 Dec 2019 07:49 )  WBC Count : 7.89 K/uL  RBC Count : 3.49 M/uL  Hemoglobin : 10.2 g/dL  Hematocrit : 31.8 %  Platelet Count - Automated : 162 K/uL  Mean Cell Volume : 91.1 fl  Mean Cell Hemoglobin : 29.2 pg  Mean Cell Hemoglobin Concentration : 32.1 gm/dL  Auto Neutrophil # : x  Auto Lymphocyte # : x  Auto Monocyte # : x  Auto Eosinophil # : x  Auto Basophil # : x  Auto Neutrophil % : x  Auto Lymphocyte % : x  Auto Monocyte % : x  Auto Eosinophil % : x  Auto Basophil % : x    12-09    142  |  108  |  16  ----------------------------<  295<H>  3.8   |  22  |  0.89    Ca    8.8      09 Dec 2019 06:21        PT/INR - ( 08 Dec 2019 09:08 )   PT: 15.0 sec;   INR: 1.29 ratio         PTT - ( 08 Dec 2019 09:08 )  PTT:32.3 sec    CAPILLARY BLOOD GLUCOSE          RADIOLOGY & ADDITIONAL TESTS:

## 2019-12-09 NOTE — PROGRESS NOTE ADULT - PROBLEM SELECTOR PLAN 2
MS continues to wax and wane  possibly due to underlying dementia  will continue to monitor mental status  will ask palliative care to see Patient

## 2019-12-09 NOTE — CHART NOTE - NSCHARTNOTEFT_GEN_A_CORE
PA Medicine Event Note    Patient made NPO over weekend for lethargy/unable to tolerate PO.  Speech and swallow consult ordered, patient is on IVF.  Discussed with Dr. Sanders to keep patient NPO for now and consult palliative care.  Palliative care consult placed.  Will continue to monitor.    Brianna Espana PA-C  Dept of Medicine  #[id number] PA Medicine Event Note    Patient made NPO over weekend for lethargy/unable to tolerate PO.  Speech and swallow consult ordered, patient is on IVF.  Discussed with Dr. Sanders to keep patient NPO for now and consult palliative care.  Ertapenem discontinued per attending.  Palliative care consult placed.  Will continue to monitor.    Brianna Espana PA-C  Dept of Medicine  #[id number]

## 2019-12-09 NOTE — PROGRESS NOTE ADULT - ASSESSMENT
89 yo male recently admitted to [facility] for hematuria presents with a change in mental status. Patient found to be very lethargic and unable to ambulate. In ED Patient was found to have a + UA with suggested Urinary tract infection. Patient very lethargic yesterday.  Alert and responsive today. Patient with waxing and waning mental status. Very alert earlier in the past and now  minimally responsive .  Will get ammonia level, abg and EEG to r/o absence seizure  with unarousability. This am not alert and barely arousable.    The patient was hypothermic last night and required warming blankets. 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

## 2019-12-10 DIAGNOSIS — F03.90 UNSPECIFIED DEMENTIA WITHOUT BEHAVIORAL DISTURBANCE: ICD-10-CM

## 2019-12-10 DIAGNOSIS — Z51.5 ENCOUNTER FOR PALLIATIVE CARE: ICD-10-CM

## 2019-12-10 DIAGNOSIS — R41.82 ALTERED MENTAL STATUS, UNSPECIFIED: ICD-10-CM

## 2019-12-10 LAB
ANION GAP SERPL CALC-SCNC: 11 MMOL/L — SIGNIFICANT CHANGE UP (ref 5–17)
BUN SERPL-MCNC: 16 MG/DL — SIGNIFICANT CHANGE UP (ref 7–23)
CALCIUM SERPL-MCNC: 9.1 MG/DL — SIGNIFICANT CHANGE UP (ref 8.4–10.5)
CHLORIDE SERPL-SCNC: 107 MMOL/L — SIGNIFICANT CHANGE UP (ref 96–108)
CO2 SERPL-SCNC: 24 MMOL/L — SIGNIFICANT CHANGE UP (ref 22–31)
CREAT SERPL-MCNC: 1.03 MG/DL — SIGNIFICANT CHANGE UP (ref 0.5–1.3)
GLUCOSE SERPL-MCNC: 122 MG/DL — HIGH (ref 70–99)
HCT VFR BLD CALC: 33.8 % — LOW (ref 39–50)
HGB BLD-MCNC: 10.8 G/DL — LOW (ref 13–17)
MCHC RBC-ENTMCNC: 28.9 PG — SIGNIFICANT CHANGE UP (ref 27–34)
MCHC RBC-ENTMCNC: 32 GM/DL — SIGNIFICANT CHANGE UP (ref 32–36)
MCV RBC AUTO: 90.4 FL — SIGNIFICANT CHANGE UP (ref 80–100)
PLATELET # BLD AUTO: 171 K/UL — SIGNIFICANT CHANGE UP (ref 150–400)
POTASSIUM SERPL-MCNC: 4 MMOL/L — SIGNIFICANT CHANGE UP (ref 3.5–5.3)
POTASSIUM SERPL-SCNC: 4 MMOL/L — SIGNIFICANT CHANGE UP (ref 3.5–5.3)
RBC # BLD: 3.74 M/UL — LOW (ref 4.2–5.8)
RBC # FLD: 14.8 % — HIGH (ref 10.3–14.5)
SODIUM SERPL-SCNC: 142 MMOL/L — SIGNIFICANT CHANGE UP (ref 135–145)
WBC # BLD: 6.95 K/UL — SIGNIFICANT CHANGE UP (ref 3.8–10.5)
WBC # FLD AUTO: 6.95 K/UL — SIGNIFICANT CHANGE UP (ref 3.8–10.5)

## 2019-12-10 PROCEDURE — 99223 1ST HOSP IP/OBS HIGH 75: CPT

## 2019-12-10 RX ADMIN — SODIUM CHLORIDE 50 MILLILITER(S): 9 INJECTION, SOLUTION INTRAVENOUS at 17:27

## 2019-12-10 RX ADMIN — RIVASTIGMINE 1 PATCH: 4.6 PATCH, EXTENDED RELEASE TRANSDERMAL at 19:00

## 2019-12-10 RX ADMIN — RIVASTIGMINE 1 PATCH: 4.6 PATCH, EXTENDED RELEASE TRANSDERMAL at 09:32

## 2019-12-10 RX ADMIN — RIVASTIGMINE 1 PATCH: 4.6 PATCH, EXTENDED RELEASE TRANSDERMAL at 05:30

## 2019-12-10 RX ADMIN — RIVASTIGMINE 1 PATCH: 4.6 PATCH, EXTENDED RELEASE TRANSDERMAL at 05:31

## 2019-12-10 NOTE — PHYSICAL THERAPY INITIAL EVALUATION ADULT - IMPAIRMENTS FOUND, PT EVAL
gait, locomotion, and balance/aerobic capacity/endurance/muscle strength/arousal, attention, and cognition/cognitive impairment/gross motor/poor safety awareness

## 2019-12-10 NOTE — CHART NOTE - NSCHARTNOTEFT_GEN_A_CORE
Upon Nutritional Assessment by the Registered Dietitian your patient was determined to meet criteria / has evidence of the following diagnosis/diagnoses:          [ ]  Mild Protein Calorie Malnutrition        [ ]  Moderate Protein Calorie Malnutrition        [x] Severe Protein Calorie Malnutrition        [ ] Unspecified Protein Calorie Malnutrition        [ ] Underweight / BMI <19        [ ] Morbid Obesity / BMI > 40      Findings as based on:  [x] Comprehensive nutrition assessment   [ ] Nutrition Focused Physical Exam  [x] Other:   Etiology: inadequate protein-energy intake in setting of prolonged hospital course and increasing lethargy.     Signs/Symptoms: <50% estimated energy intake x4 days and PTA, 5.4% wt loss x 1 weeks.       Nutrition Plan/Recommendations:    1) When medically feasible, recommend DASH diet deferring textures/consistencies to team and SLP. Also please add 1 Ensure Enlive Daily.   2) Pt aware RD remains available for any concerns, questions or diet preferences   3) Malnutrition alert placed in chart   4) Continue to monitor PO intake and GI tolerance    Delilah Urena RD. Pager: [phone]     PROVIDER Section:     By signing this assessment you are acknowledging and agree with the diagnosis/diagnoses assigned by the Registered Dietitian    Comments:

## 2019-12-10 NOTE — DIETITIAN INITIAL EVALUATION ADULT. - PHYSICAL APPEARANCE
Nutrition-focused physical exam unable to be performed at this time, pt sleeping and is unable to provide consent. Visually observed mild-moderate fat depletions of orbital fat pads and buccal region. Advanced age is noted.

## 2019-12-10 NOTE — PHYSICAL THERAPY INITIAL EVALUATION ADULT - ADDITIONAL COMMENTS
PT spoke with daughter, Sallie, for social history. Pt lives in nursing home the [facility] w/24/7 [facility] for supervision and assist with functional mobility and ADLs. Pt was ambulating 100ft w/RW and supervision as little as one month ago. Pt use w/c outdoors or when not "having a good day".

## 2019-12-10 NOTE — PROGRESS NOTE ADULT - ASSESSMENT
89 yo male recently admitted to [facility] for hematuria presents with a change in mental status. Patient found to be very lethargic and unable to ambulate. In ED Patient was found to have a + UA with suggested Urinary tract infection. Patient very lethargic yesterday.  Alert and responsive today. Patient with waxing and waning mental status. Very alert earlier in the past and now  minimally responsive .  Will get ammonia level, abg and EEG to r/o absence seizure  with unarousability. This am not alert and barely arousable.    The patient was hypothermic last night and required warming blankets. 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 Patient found to be very lethargic and unable to ambulate. In ED Patient was found to have a + UA with suggested Urinary tract infection. Patient very lethargic yesterday.  Alert and responsive today. Patient with waxing and waning mental status. Very alert earlier in the past and now  minimally responsive .  Will get ammonia level, abg and EEG to r/o absence seizure  with unarousability. This am not alert and barely arousable.    The patient was hypothermic last night and required warming blankets. 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 He has now completed Ertapenem for a resistant Proteus Infection.  Awake this morning and speaking with his son. He is coherent and asking for food. DNR/DNI now in effect; at family request.

## 2019-12-10 NOTE — CONSULT NOTE ADULT - PROBLEM SELECTOR RECOMMENDATION 2
- patient appears to be AAO x 1, unclear of his location or year  - will obtain further baseline from family

## 2019-12-10 NOTE — PHYSICAL THERAPY INITIAL EVALUATION ADULT - PATIENT/FAMILY AGREES WITH PLAN
LVM for pt to call back for results    Pt to be made aware of normal pap    ----- Message from Kaity Tenorio MD sent at 3/28/2019  2:40 PM CDT -----  Hello. Please notify pt of normal pap smear. Thank you.        spoke to daughter tre on phone/yes

## 2019-12-10 NOTE — PHYSICAL THERAPY INITIAL EVALUATION ADULT - IMPAIRMENTS CONTRIBUTING IMPAIRED BED MOBILITY, REHAB EVAL
impaired coordination/decreased strength/impaired balance/cognition/decreased flexibility/decreased ROM/impaired postural control

## 2019-12-10 NOTE — PHYSICAL THERAPY INITIAL EVALUATION ADULT - REFERRING PHYSICIAN, REHAB EVAL
Pt semi-supine in bed in NAD, + IV, +PIC line. Pt lethargic but arousable to name, AOx1. Keep eyes closed most of the time but will open when asked. Pt semi-supine in bed in NAD, R hand +IV Lock, L UE +Mid Line. Pt lethargic but arousable to name, AOx1. Keep eyes closed most of the time but will open when asked.

## 2019-12-10 NOTE — PHYSICAL THERAPY INITIAL EVALUATION ADULT - CRITERIA FOR SKILLED THERAPEUTIC INTERVENTIONS
anticipated discharge recommendation/subacute Rehab/impairments found/rehab potential/functional limitations in following categories/risk reduction/prevention

## 2019-12-10 NOTE — DIETITIAN INITIAL EVALUATION ADULT. - REASON INDICATOR FOR ASSESSMENT
Pt seen for NPO x4 days.  Source: EMR, previous RD note. Pt sleeping on multiple attempts to visit, also noted pt lethargic, confused and with dementia.  Per chart, 89 y/o male recently admitted and discharged, presenting from rehab for abnormal labs and lethargy. Palliative care and swallow evaluation consulted. "Obtundation, hypothermia and loss of cognitive function c/w multi infarct dementia and will likely be irreversible"  PMH: a fib, dementia, HTN, CAD Pt seen for NPO x4 days.  Source: EMR, previous RD note. Pt sleeping on multiple attempts to visit, also noted pt lethargic/dementia, daughter on phone [phone]  Per chart, 89 y/o male recently admitted and discharged, presenting from rehab for abnormal labs and lethargy. Palliative care and swallow evaluation consulted. "Obtundation, hypothermia and loss of cognitive function c/w multi infarct dementia and will likely be irreversible"  PMH: a fib, dementia, HTN, CAD

## 2019-12-10 NOTE — PHYSICAL THERAPY INITIAL EVALUATION ADULT - GENERAL OBSERVATIONS, REHAB EVAL
89 y/o male sent to ED from nursing home for unspecified abnormal labs and lethargy. Pt d/c from [place] 2/days ago for UTI. EMS found pt to be hypoxic spO2 90%, placed on 3L. Pt w/history of multi infarct  dementia. Has had multi-episodes of waxing and waning mental status since admission. Baseline is alert and 89 y/o male sent to ED from [facility] for unspecified abnormal labs and lethargy. Pt d/c from [facility] 2/days ago for UTI. EMS found pt to be hypoxic spO2 90%, placed on 3L. Pt w/history of multi infarct  dementia. Has had multi-episodes of waxing and waning mental status since admission. Baseline is alert and

## 2019-12-10 NOTE — DIETITIAN INITIAL EVALUATION ADULT. - ENERGY NEEDS
Ht: 70 inches Wt: 175 pounds (12/7) BMI: 25.1 kg/m2 IBW: 166 pounds(+/-10%) 105%IBW  1+ L arm edema. No pressure ulcers documented. IAD noted.

## 2019-12-10 NOTE — DIETITIAN INITIAL EVALUATION ADULT. - ADD RECOMMEND
1) When medically feasible, recommend DASH diet deferring textures/consistencies to team and SLP. Also please add 1 Ensure Enlive Daily. 2) Pt aware RD remains available for any concerns, questions or diet preferences 3) Malnutrition alert placed in chart 4) Continue to monitor PO intake and GI tolerance

## 2019-12-10 NOTE — CONSULT NOTE ADULT - SUBJECTIVE AND OBJECTIVE BOX
HPI: 89M with PMH including dementia, AF, HLD, HTN, and CAD s/p PCI x 2, here from SNF with abnormal labs and lethargy, after recent d/c from [facility] for UTI. Has had waxing and waning mental status during his admission thus far, as well as hypothermia requiring warming blankets; concern exists that his cognitive impairment may not improve. Was also on ertapenem for Proteus infection, now s/p abx. Palliative care called to discuss GOC.     PERTINENT PM/SXH:   Atrial fibrillation  UTI (urinary tract infection)  Back pain  Fall  Dementia  Hypercholesteremia  HTN - Hypertension  CAD (Coronary Artery Disease)    S/P percutaneous transluminal coronary angioplasty  h/o Hernia Repair    FAMILY HISTORY:  No pertinent family history in first degree relatives    ITEMS NOT CHECKED ARE NOT PRESENT    SOCIAL HISTORY:   Significant other/partner:  [ ]    Children:  [X]    Religious/Spirituality: Religious  Substance hx: none [ ]   Tobacco hx:  [ ]   Alcohol hx: [ ] Drug use hx: [ ]  Home Opioid hx:  [ ] (I-Stop Reference No: [id number])  Living Situation: [ ]Home  [ X]Long term care @ [facility] and 24h [facility]HA  [ ]Rehab [ ]Other  Occupation:    ADVANCE DIRECTIVES:    DNR [X]  MOLST  [ ]    Living Will  [ ]   DECISION MAKER(s):  [ ] Health Care Proxy(s)  [ ] Surrogate(s)  [ ] Guardian           Name(s) and Contact(s): daughter Wendy Goldberg [phone]    BASELINE (I)ADL(s) (prior to admission):  [place]: [ ]Total  [ ] Moderate [ ]Dependent    Allergies    No Known Allergies    Intolerances    morphine (Unknown)  MEDICATIONS  (STANDING):  amLODIPine   Tablet 10 milliGRAM(s) Oral daily  dextrose 5% + sodium chloride 0.45%. 1000 milliLiter(s) (50 mL/Hr) IV Continuous <Continuous>  finasteride 5 milliGRAM(s) Oral daily  rivastigmine patch  9.5 mG/24 Hr(s) 1 Patch Transdermal every 24 hours  tamsulosin 0.4 milliGRAM(s) Oral at bedtime    MEDICATIONS  (PRN):  acetaminophen    Suspension .. 650 milliGRAM(s) Oral every 4 hours PRN Temp greater or equal to 38C (100.4F), Moderate Pain (4 - 6)  albuterol/ipratropium for Nebulization 3 milliLiter(s) Nebulizer every 6 hours PRN Shortness of Breath and/or Wheezing  polyethylene glycol 3350 17 Gram(s) Oral at bedtime PRN Constipation    PRESENT SYMPTOMS:   Source if other than patient:  [ ]Family   [ ]Team     Pain (Impact on QOL):    Location -         Minimal acceptable level (0-10 scale):                    Aggravating factors -  Quality -  Radiation -  Severity (0-10 scale) -    Timing -    PAIN AD Score:     [email or web address] (press ctrl +  left click to view)    Dyspnea:                           [ ]Mild [ ]Moderate [ ]Severe  Anxiety:                             [ ]Mild [ ]Moderate [ ]Severe  Fatigue:                             [ ]Mild [ ]Moderate [ ]Severe  Nausea:                             [ ]Mild [ ]Moderate [ ]Severe  Loss of appetite:              [ ]Mild [ ]Moderate [ ]Severe  Constipation:                    [ ]Mild [ ]Moderate [ ]Severe    Other Symptoms:  [ ]All other review of systems negative   [ ]Unable to obtain due to poor mentation     Karnofsky Performance Score/Palliative Performance Status Version 2:         %    PHYSICAL EXAM:  Vital Signs Last 24 Hrs  T(C): 36.6 (10 Dec 2019 11:20), Max: 36.8 (09 Dec 2019 21:52)  T(F): 97.9 (10 Dec 2019 11:20), Max: 98.2 (09 Dec 2019 21:52)  HR: 62 (10 Dec 2019 11:20) (58 - 62)  BP: 129/79 (10 Dec 2019 11:20) (128/74 - 154/73)  BP(mean): --  RR: 18 (10 Dec 2019 11:20) (18 - 18)  SpO2: 97% (10 Dec 2019 11:20) (96% - 97%) I&O's Summary    09 Dec 2019 07:01  -  10 Dec 2019 07:00  --------------------------------------------------------  IN: 1200 mL / OUT: 0 mL / NET: 1200 mL        GENERAL:  [ ]Alert  [ ]Oriented x   [ ]Lethargic  [ ]Cachexia  [ ]Unarousable  [ ]Verbal  [ ]Non-Verbal    Behavioral:   [ ] Anxiety  [ ] Delirium [ ] Agitation [ ] Other    HEENT:  [ ]Normal   [ ]Dry mouth   [ ]ET Tube/Trach  [ ]Oral lesions    PULMONARY:   [ ]Clear [ ]Tachypnea  [ ]Audible excessive secretions   [ ]Rhonchi        [ ]Right [ ]Left [ ]Bilateral  [ ]Crackles        [ ]Right [ ]Left [ ]Bilateral  [ ]Wheezing     [ ]Right [ ]Left [ ]Bilateral    CARDIOVASCULAR:    [ ]Regular [ ]Irregular [ ]Tachy  [ ]Yosvany [ ]Murmur [ ]Other    GASTROINTESTINAL:  [ ]Soft  [ ]Distended   [ ]+BS  [ ]Non tender [ ]Tender  [ ]PEG [ ]OGT/ NGT  Last BM:     GENITOURINARY:  [ ]Normal [ ] Incontinent   [ ]Oliguria/Anuria   [ ]Mai    MUSCULOSKELETAL:   [ ]Normal   [ ]Weakness  [ ]Bed/Wheelchair bound [ ]Edema    NEUROLOGIC:   [ ]No focal deficits  [ ] Cognitive impairment  [ ] Dysphagia [ ]Dysarthria [ ] Paresis [ ]Other     SKIN:   [ ]Normal   [ ]Pressure ulcer(s)  [ ]Rash    CRITICAL CARE:  [ ] Shock Present  [ ]Septic [ ]Cardiogenic [ ]Neurologic [ ]Hypovolemic  [ ]  Vasopressors [ ]  Inotropes   [ ] Respiratory failure present  [ ] Acute  [ ] Chronic [ ] Hypoxic  [ ] Hypercarbic [ ] Other  [ ] Other organ failure     LABS: reviewed                        10.8   6.95  )-----------( 171      ( 10 Dec 2019 09:13 )             33.8   12-10    142  |  107  |  16  ----------------------------<  122<H>  4.0   |  24  |  1.03    Ca    9.1      10 Dec 2019 05:45    RADIOLOGY & ADDITIONAL STUDIES:    CXR 12/8/19    Left basilar atelectasis. Otherwise, clear lungs.    PROTEIN CALORIE MALNUTRITION:   [ ] PPSV2 < or = to 30% [ ] significant weight loss  [ ] poor nutritional intake [ ] catabolic state [ ] anasarca     Albumin, Serum: 3.7 g/dL (12-06-19 @ 17:42)  Artificial Nutrition [ ]     REFERRALS:   [ ]Chaplaincy  [ ] Hospice  [ ]Child Life  [ ]Social Work  [ ]Case management [ ]Holistic Therapy     Goals of Care Discussion Document:     ........ ....... ...... ..... .... ... .. .  Sergey Samano MD  Palliative Medicine  office: [phone] | [phone]  pager: [phone] HPI: 89M with PMH including dementia, AF, HLD, HTN, and CAD s/p PCI x 2, here from SNF with abnormal labs and lethargy, after recent d/c from [facility] for UTI. Has had waxing and waning mental status during his admission thus far, as well as hypothermia requiring warming blankets; concern exists that his cognitive impairment may not improve. Was also on ertapenem for Proteus infection, now s/p abx. Palliative care called to discuss GOC.     PERTINENT PM/SXH:   Atrial fibrillation  UTI (urinary tract infection)  Back pain  Fall  Dementia  Hypercholesteremia  HTN - Hypertension  CAD (Coronary Artery Disease)    S/P percutaneous transluminal coronary angioplasty  h/o Hernia Repair    FAMILY HISTORY:  No pertinent family history in first degree relatives    ITEMS NOT CHECKED ARE NOT PRESENT    SOCIAL HISTORY:   Significant other/partner:  [ ]    Children:  [X]    Orthodox/Spirituality: Muslim  Substance hx: none [ ]   Tobacco hx:  [ ]   Alcohol hx: [ ] Drug use hx: [ ]  Home Opioid hx:  [ ] (I-Stop Reference No: [id number])  Living Situation: [ ]Home  [ X]Long term care @ [facility] and 24h HHA  [ ]Rehab [ ]Other  Occupation:    ADVANCE DIRECTIVES:    DNR [X]  MOLST  [ ]    Living Will  [ ]   DECISION MAKER(s):  [ ] Health Care Proxy(s)  [ ] Surrogate(s)  [ ] Guardian           Name(s) and Contact(s): daughter Wendy Goldberg [phone]    BASELINE (I)ADL(s) (prior to admission):  [place]: [ ]Total  [ ] Moderate [ ]Dependent    Allergies    No Known Allergies    Intolerances    morphine (Unknown)  MEDICATIONS  (STANDING):  amLODIPine   Tablet 10 milliGRAM(s) Oral daily  dextrose 5% + sodium chloride 0.45%. 1000 milliLiter(s) (50 mL/Hr) IV Continuous <Continuous>  finasteride 5 milliGRAM(s) Oral daily  rivastigmine patch  9.5 mG/24 Hr(s) 1 Patch Transdermal every 24 hours  tamsulosin 0.4 milliGRAM(s) Oral at bedtime    MEDICATIONS  (PRN):  acetaminophen    Suspension .. 650 milliGRAM(s) Oral every 4 hours PRN Temp greater or equal to 38C (100.4F), Moderate Pain (4 - 6)  albuterol/ipratropium for Nebulization 3 milliLiter(s) Nebulizer every 6 hours PRN Shortness of Breath and/or Wheezing  polyethylene glycol 3350 17 Gram(s) Oral at bedtime PRN Constipation    PRESENT SYMPTOMS:   Source if other than patient:  [ ]Family   [ ]Team     Pain (Impact on QOL):    Location -         Minimal acceptable level (0-10 scale):                    Aggravating factors -  Quality -  Radiation -  Severity (0-10 scale) -    Timing -    PAIN AD Score:     [email or web address] (press ctrl +  left click to view)    Dyspnea:                           [ ]Mild [ ]Moderate [ ]Severe  Anxiety:                             [ ]Mild [ ]Moderate [ ]Severe  Fatigue:                             [ ]Mild [ ]Moderate [ ]Severe  Nausea:                             [ ]Mild [ ]Moderate [ ]Severe  Loss of appetite:              [ ]Mild [ ]Moderate [ ]Severe  Constipation:                    [ ]Mild [ ]Moderate [ ]Severe    Other Symptoms:  [ X]All other review of systems negative   [ ]Unable to obtain due to poor mentation     Karnofsky Performance Score/Palliative Performance Status Version 2:         %    PHYSICAL EXAM:  Vital Signs Last 24 Hrs  T(C): 36.6 (10 Dec 2019 11:20), Max: 36.8 (09 Dec 2019 21:52)  T(F): 97.9 (10 Dec 2019 11:20), Max: 98.2 (09 Dec 2019 21:52)  HR: 62 (10 Dec 2019 11:20) (58 - 62)  BP: 129/79 (10 Dec 2019 11:20) (128/74 - 154/73)  BP(mean): --  RR: 18 (10 Dec 2019 11:20) (18 - 18)  SpO2: 97% (10 Dec 2019 11:20) (96% - 97%) I&O's Summary    09 Dec 2019 07:01  -  10 Dec 2019 07:00  --------------------------------------------------------  IN: 1200 mL / OUT: 0 mL / NET: 1200 mL        GENERAL:  [ ]Alert  [ ]Oriented x   [ ]Lethargic  [ ]Cachexia  [ ]Unarousable  [X ]Verbal  [ ]Non-Verbal    Behavioral:   [ ] Anxiety  [ ] Delirium [ ] Agitation [ ] Other    HEENT:  [ X]Normal   [ ]Dry mouth   [ ]ET Tube/Trach  [ ]Oral lesions    PULMONARY:   [X ]Clear [ ]Tachypnea  [ ]Audible excessive secretions   [ ]Rhonchi        [ ]Right [ ]Left [ ]Bilateral  [ ]Crackles        [ ]Right [ ]Left [ ]Bilateral  [ ]Wheezing     [ ]Right [ ]Left [ ]Bilateral    CARDIOVASCULAR:    [X ]Regular [ ]Irregular [ ]Tachy  [ ]Yosvany [ ]Murmur [ ]Other    GASTROINTESTINAL:  [X ]Soft  [ ]Distended   [X ]+BS  [X ]Non tender [ ]Tender  [ ]PEG [ ]OGT/ NGT  Last BM:     GENITOURINARY:  [ ]Normal [ ] Incontinent   [ ]Oliguria/Anuria   [ ]Mai    MUSCULOSKELETAL:   [ ]Normal   [ ]Weakness  [ ]Bed/Wheelchair bound [ ]Edema    NEUROLOGIC:   [ ]No focal deficits  [ ] Cognitive impairment  [ ] Dysphagia [ ]Dysarthria [ ] Paresis [ ]Other     SKIN:   [ ]Normal   [ ]Pressure ulcer(s)  [ ]Rash    CRITICAL CARE:  [ ] Shock Present  [ ]Septic [ ]Cardiogenic [ ]Neurologic [ ]Hypovolemic  [ ]  Vasopressors [ ]  Inotropes   [ ] Respiratory failure present  [ ] Acute  [ ] Chronic [ ] Hypoxic  [ ] Hypercarbic [ ] Other  [ ] Other organ failure     LABS: reviewed                        10.8   6.95  )-----------( 171      ( 10 Dec 2019 09:13 )             33.8   12-10    142  |  107  |  16  ----------------------------<  122<H>  4.0   |  24  |  1.03    Ca    9.1      10 Dec 2019 05:45    RADIOLOGY & ADDITIONAL STUDIES:    CXR 12/8/19    Left basilar atelectasis. Otherwise, clear lungs.    PROTEIN CALORIE MALNUTRITION:   [ ] PPSV2 < or = to 30% [ ] significant weight loss  [ ] poor nutritional intake [ ] catabolic state [ ] anasarca     Albumin, Serum: 3.7 g/dL (12-06-19 @ 17:42)  Artificial Nutrition [ ]     REFERRALS:   [ ]Chaplaincy  [ ] Hospice  [ ]Child Life  [ ]Social Work  [ ]Case management [ ]Holistic Therapy     Goals of Care Discussion Document:     ........ ....... ...... ..... .... ... .. .  Sergey Samano MD  Palliative Medicine  office: [phone] | [phone]  pager: [phone]

## 2019-12-10 NOTE — PROGRESS NOTE ADULT - SUBJECTIVE AND OBJECTIVE BOX
89 year old male with pmhx AFib, UTI, dementia, hypercholesteremia, HTN, CAD and pshx PCI x2, hernia repair presents to the ED s/p recent discharge from [place] x2 days ago for UTI. Pt was sent in from nursing home for abnormal labs and lethargy. Reported that since yesterday, pt was more lethargic. Today, the pt had labs conducted at the nursing home, with unspecified abnormal lab values. Subsequently referred to [facility] ED, found to be hypoxic by EMS to spO2 90%, placed on 3L NC, responsive to voice. Per nursing home, pt is usually able to have a conversation and is alert. Denies fever. Upon review of lab results brought by EMS, only abnormality on CBC and CMP is a hemoglobin value of 10.9. Patient with a hx of worsening multi infarct dementia. Has had multiple episode of waxing and waning mental status Pt still very lethargic      MEDICATIONS  (STANDING):  amLODIPine   Tablet 10 milliGRAM(s) Oral daily  dextrose 5% + sodium chloride 0.45%. 1000 milliLiter(s) (50 mL/Hr) IV Continuous <Continuous>  finasteride 5 milliGRAM(s) Oral daily  rivastigmine patch  9.5 mG/24 Hr(s) 1 Patch Transdermal every 24 hours  tamsulosin 0.4 milliGRAM(s) Oral at bedtime    MEDICATIONS  (PRN):  acetaminophen    Suspension .. 650 milliGRAM(s) Oral every 4 hours PRN Temp greater or equal to 38C (100.4F), Moderate Pain (4 - 6)  albuterol/ipratropium for Nebulization 3 milliLiter(s) Nebulizer every 6 hours PRN Shortness of Breath and/or Wheezing  polyethylene glycol 3350 17 Gram(s) Oral at bedtime PRN Constipation      Vital Signs Last 24 Hrs  T(C): 36.7 (10 Dec 2019 04:16), Max: 36.8 (09 Dec 2019 21:52)  T(F): 98.1 (10 Dec 2019 04:16), Max: 98.2 (09 Dec 2019 21:52)  HR: 58 (10 Dec 2019 04:16) (58 - 62)  BP: 128/74 (10 Dec 2019 04:16) (106/66 - 154/73)  BP(mean): --  RR: 18 (10 Dec 2019 04:16) (18 - 18)  SpO2: 97% (10 Dec 2019 04:16) (95% - 97%)      PHYSICAL EXAM:  	GENERAL: NAD, well nourished  	HEAD:  Atraumatic  	EYES: EOM, PERRLA, conjunctiva pink and sclera white  	ENT: No tonsillar erythema, exudates, or enlargement, moist mucous membranes, good dentition, no lesions  	NECK: Supple, No JVD, normal thyroid, carotids with normal upstrokes and no bruits  	CHEST/LUNG: Clear to auscultation bilaterally, No rales, rhonchi, wheezing, or rubs  	HEART: Regular rate and rhythm, No murmurs, rubs, or gallops  	ABDOMEN: Soft, nondistended, no masses, guarding, tenderness or rebound, bowel sounds present  	EXTREMITIES:  2+ Peripheral Pulses, No clubbing, cyanosis, or edema.   	LYMPH: No lymphadenopathy noted  	SKIN: No rashes or lesions  NERVOUS SYSTEM:  confused minimally responsive    LABS:       CBC Full  -  ( 10 Dec 2019 09:13 )  WBC Count : 6.95 K/uL  RBC Count : 3.74 M/uL  Hemoglobin : 10.8 g/dL  Hematocrit : 33.8 %  Platelet Count - Automated : 171 K/uL  Mean Cell Volume : 90.4 fl  Mean Cell Hemoglobin : 28.9 pg  Mean Cell Hemoglobin Concentration : 32.0 gm/dL  Auto Neutrophil # : x  Auto Lymphocyte # : x  Auto Monocyte # : x  Auto Eosinophil # : x  Auto Basophil # : x  Auto Neutrophil % : x  Auto Lymphocyte % : x  Auto Monocyte % : x  Auto Eosinophil % : x  Auto Basophil % : x    12-10    142  |  107  |  16  ----------------------------<  122<H>  4.0   |  24  |  1.03    Ca    9.1      10 Dec 2019 05:45            CAPILLARY BLOOD GLUCOSE          RADIOLOGY & ADDITIONAL TESTS: 89 year old male with pmhx AFib, UTI, dementia, hypercholesteremia, HTN, CAD and pshx PCI x2, hernia repair presents to the ED s/p recent discharge from [place] x2 days ago for UTI. Pt was sent in from nursing home for abnormal labs and lethargy. Reported that since yesterday, pt was more lethargic. Today, the pt had labs conducted at the nursing home, with unspecified abnormal lab values. Subsequently referred to [facility] ED, found to be hypoxic by EMS to spO2 90%, placed on 3L NC, responsive to voice. Per nursing home, pt is usually able to have a conversation and is alert. Denies fever. Upon review of lab results brought by EMS, only abnormality on CBC and CMP is a hemoglobin value of 10.9. Patient with a hx of worsening multi infarct dementia. Has had multiple episode of waxing and waning mental status Pt still very lethargic. Awake this morning and speaking with his son. He is coherent and asking for food. DNR/DNI now in effect; at family request.      MEDICATIONS  (STANDING):  amLODIPine   Tablet 10 milliGRAM(s) Oral daily  dextrose 5% + sodium chloride 0.45%. 1000 milliLiter(s) (50 mL/Hr) IV Continuous <Continuous>  finasteride 5 milliGRAM(s) Oral daily  rivastigmine patch  9.5 mG/24 Hr(s) 1 Patch Transdermal every 24 hours  tamsulosin 0.4 milliGRAM(s) Oral at bedtime    MEDICATIONS  (PRN):  acetaminophen    Suspension .. 650 milliGRAM(s) Oral every 4 hours PRN Temp greater or equal to 38C (100.4F), Moderate Pain (4 - 6)  albuterol/ipratropium for Nebulization 3 milliLiter(s) Nebulizer every 6 hours PRN Shortness of Breath and/or Wheezing  polyethylene glycol 3350 17 Gram(s) Oral at bedtime PRN Constipation      Vital Signs Last 24 Hrs  T(C): 36.7 (10 Dec 2019 04:16), Max: 36.8 (09 Dec 2019 21:52)  T(F): 98.1 (10 Dec 2019 04:16), Max: 98.2 (09 Dec 2019 21:52)  HR: 58 (10 Dec 2019 04:16) (58 - 62)  BP: 128/74 (10 Dec 2019 04:16) (106/66 - 154/73)  BP(mean): --  RR: 18 (10 Dec 2019 04:16) (18 - 18)  SpO2: 97% (10 Dec 2019 04:16) (95% - 97%)      PHYSICAL EXAM:  	GENERAL: NAD, well nourished  	HEAD:  Atraumatic  	EYES: EOM, PERRLA, conjunctiva pink and sclera white  	ENT: No tonsillar erythema, exudates, or enlargement, moist mucous membranes, good dentition, no lesions  	NECK: Supple, No JVD, normal thyroid, carotids with normal upstrokes and no bruits  	CHEST/LUNG: Clear to auscultation bilaterally, No rales, rhonchi, wheezing, or rubs  	HEART: Regular rate and rhythm, No murmurs, rubs, or gallops  	ABDOMEN: Soft, nondistended, no masses, guarding, tenderness or rebound, bowel sounds present  	EXTREMITIES:  2+ Peripheral Pulses, No clubbing, cyanosis, or edema.   	LYMPH: No lymphadenopathy noted  	SKIN: No rashes or lesions  NERVOUS SYSTEM:  confused minimally responsive    LABS:       CBC Full  -  ( 10 Dec 2019 09:13 )  WBC Count : 6.95 K/uL  RBC Count : 3.74 M/uL  Hemoglobin : 10.8 g/dL  Hematocrit : 33.8 %  Platelet Count - Automated : 171 K/uL  Mean Cell Volume : 90.4 fl  Mean Cell Hemoglobin : 28.9 pg  Mean Cell Hemoglobin Concentration : 32.0 gm/dL  Auto Neutrophil # : x  Auto Lymphocyte # : x  Auto Monocyte # : x  Auto Eosinophil # : x  Auto Basophil # : x  Auto Neutrophil % : x  Auto Lymphocyte % : x  Auto Monocyte % : x  Auto Eosinophil % : x  Auto Basophil % : x    12-10    142  |  107  |  16  ----------------------------<  122<H>  4.0   |  24  |  1.03    Ca    9.1      10 Dec 2019 05:45            CAPILLARY BLOOD GLUCOSE          RADIOLOGY & ADDITIONAL TESTS:

## 2019-12-10 NOTE — DIETITIAN INITIAL EVALUATION ADULT. - OTHER INFO
Intake PTA: Pt was previously admitted and was taking mechanical soft foods. MBS was conducted on 9/20/19, with recommendations for puree/thin liquids although exam was limited. Pt is ordered for speech and swallow evaluation this admission. pt's daughter states pt was eating regular foods and chopped foods at the rehab facility, although he was very tired and needed to be coaxed.     Daughter confirms NKFA, micronutrient supplementation PTA Multivitamin and calcium 600+ D, last BM ___.    Diet: Pt NPO x4 days pending swallow evaluation, discussed diet progression with daughter depending upon swallow evaluation results. Pt's daughter amenable for pt to try Ensure Enlive if/when diet is progressed, pt has taken in the past.    Weight Hx: Per sunrise: 191 pounds (11/29), 185 pounds (12/3), 184 pounds (12/4), 175 pounds (12/7). Bed scale discrepancies possible, although pt with extensive hospital course and dementia, wt loss likely. Intake PTA: Pt was previously admitted and was taking mechanical soft foods. MBS was conducted on 9/20/19, with recommendations for puree/thin liquids although exam was limited. Pt is ordered for speech and swallow evaluation this admission. pt's daughter states pt was eating regular foods and chopped foods at the rehab facility, although he was very tired and needed to be coaxed.     Daughter confirms NKFA, micronutrient supplementation PTA Multivitamin and calcium 600+ D, Noted pt on bowel regimen, no BMs yet documented.    Diet: Pt NPO x4 days pending swallow evaluation, discussed diet progression with daughter depending upon swallow evaluation results. Pt's daughter amenable for pt to try Ensure Enlive if/when diet is progressed, pt has taken in the past.    Weight Hx: Per [facility]: 191 pounds (11/29), 185 pounds (12/3), 184 pounds (12/4), 175 pounds (12/7). Bed scale discrepancies possible, although pt with extensive hospital course and dementia, wt loss likely.

## 2019-12-10 NOTE — CONSULT NOTE ADULT - PROBLEM SELECTOR RECOMMENDATION 9
Full consult to follow shortly. Please page [phone] with any acute concerns. - continue to monitor  - will need to discuss with daughter regarding baseline mental status

## 2019-12-10 NOTE — CONSULT NOTE ADULT - ASSESSMENT
89M with PMH including dementia, AF, HLD, HTN, and CAD s/p PCI x 2, here from SNF with abnormal labs and lethargy, after recent d/c from [facility] for UTI. Has had waxing and waning mental status during his admission thus far, as well as hypothermia requiring warming blankets; concern exists that his cognitive impairment may not improve. Was also on ertapenem for Proteus infection, now s/p abx. Palliative care called to discuss GOC.

## 2019-12-10 NOTE — PHYSICAL THERAPY INITIAL EVALUATION ADULT - DIAGNOSIS, PT EVAL
pt is very lethargic and has decrease activity tolerance, decrease functional mobility capacity, decrease strength, impaired balance

## 2019-12-10 NOTE — PHYSICAL THERAPY INITIAL EVALUATION ADULT - PERTINENT HX OF CURRENT PROBLEM, REHAB EVAL
nornally able to have conversation. + UTI (IV ertapenem), +AMS, Dementia, HTN. PMHx: Afib (no AC), falls, HLD, CAD, PCI x 2, hernia repair. Chest Xray: Left basilar atelectasis. Otherwise, clear lungs. (+) hypothermic requiring warming blankets. Concern for cognitive impairment not to improve. Palliative care recommendation. normally able to have conversation. + UTI (IV ertapenem), +AMS, Dementia, HTN. PMHx: Afib (no AC), falls, HLD, CAD, PCI x 2, hernia repair. Chest Xray: Left basilar atelectasis. Otherwise, clear lungs. (+) hypothermic requiring warming blankets. Concern for cognitive impairment not to improve. Palliative care recommendation.

## 2019-12-11 LAB
ANION GAP SERPL CALC-SCNC: 13 MMOL/L — SIGNIFICANT CHANGE UP (ref 5–17)
BUN SERPL-MCNC: 16 MG/DL — SIGNIFICANT CHANGE UP (ref 7–23)
CALCIUM SERPL-MCNC: 9.3 MG/DL — SIGNIFICANT CHANGE UP (ref 8.4–10.5)
CHLORIDE SERPL-SCNC: 102 MMOL/L — SIGNIFICANT CHANGE UP (ref 96–108)
CO2 SERPL-SCNC: 23 MMOL/L — SIGNIFICANT CHANGE UP (ref 22–31)
CREAT SERPL-MCNC: 1.05 MG/DL — SIGNIFICANT CHANGE UP (ref 0.5–1.3)
CULTURE RESULTS: SIGNIFICANT CHANGE UP
CULTURE RESULTS: SIGNIFICANT CHANGE UP
GLUCOSE SERPL-MCNC: 112 MG/DL — HIGH (ref 70–99)
HCT VFR BLD CALC: 34.4 % — LOW (ref 39–50)
HGB BLD-MCNC: 11.1 G/DL — LOW (ref 13–17)
MCHC RBC-ENTMCNC: 29 PG — SIGNIFICANT CHANGE UP (ref 27–34)
MCHC RBC-ENTMCNC: 32.3 GM/DL — SIGNIFICANT CHANGE UP (ref 32–36)
MCV RBC AUTO: 89.8 FL — SIGNIFICANT CHANGE UP (ref 80–100)
PLATELET # BLD AUTO: 199 K/UL — SIGNIFICANT CHANGE UP (ref 150–400)
POTASSIUM SERPL-MCNC: 3.9 MMOL/L — SIGNIFICANT CHANGE UP (ref 3.5–5.3)
POTASSIUM SERPL-SCNC: 3.9 MMOL/L — SIGNIFICANT CHANGE UP (ref 3.5–5.3)
RBC # BLD: 3.83 M/UL — LOW (ref 4.2–5.8)
RBC # FLD: 14.8 % — HIGH (ref 10.3–14.5)
SODIUM SERPL-SCNC: 138 MMOL/L — SIGNIFICANT CHANGE UP (ref 135–145)
SPECIMEN SOURCE: SIGNIFICANT CHANGE UP
SPECIMEN SOURCE: SIGNIFICANT CHANGE UP
WBC # BLD: 7.45 K/UL — SIGNIFICANT CHANGE UP (ref 3.8–10.5)
WBC # FLD AUTO: 7.45 K/UL — SIGNIFICANT CHANGE UP (ref 3.8–10.5)

## 2019-12-11 PROCEDURE — 70450 CT HEAD/BRAIN W/O DYE: CPT | Mod: 26

## 2019-12-11 RX ADMIN — RIVASTIGMINE 1 PATCH: 4.6 PATCH, EXTENDED RELEASE TRANSDERMAL at 20:34

## 2019-12-11 RX ADMIN — TAMSULOSIN HYDROCHLORIDE 0.4 MILLIGRAM(S): 0.4 CAPSULE ORAL at 22:03

## 2019-12-11 RX ADMIN — RIVASTIGMINE 1 PATCH: 4.6 PATCH, EXTENDED RELEASE TRANSDERMAL at 07:00

## 2019-12-11 RX ADMIN — FINASTERIDE 5 MILLIGRAM(S): 5 TABLET, FILM COATED ORAL at 16:56

## 2019-12-11 RX ADMIN — SODIUM CHLORIDE 50 MILLILITER(S): 9 INJECTION, SOLUTION INTRAVENOUS at 22:03

## 2019-12-11 RX ADMIN — RIVASTIGMINE 1 PATCH: 4.6 PATCH, EXTENDED RELEASE TRANSDERMAL at 05:00

## 2019-12-11 NOTE — CHART NOTE - NSCHARTNOTEFT_GEN_A_CORE
PA Medicine Event Note    Notified by nurse manager that CT head was ordered this AM by ED physician by mistake (order intended for another patient).  I was not notified patient was transported down for exam. Notified after exam result was posted.  Family was notified by nurse manager of mistake.  I notified Dr. Sanders of above.    Brianna Espana PA-C  Dept of Medicine  #[id number]

## 2019-12-11 NOTE — PROGRESS NOTE ADULT - ASSESSMENT
89 yo male recently admitted to [facility] for hematuria presents with a change in mental status. Patient found to be very lethargic and unable to ambulate. In ED Patient was found to have a + UA with suggested Urinary tract infection. Patient very lethargic yesterday.  Alert and responsive today. Patient with waxing and waning mental status. Very alert earlier in the past and now  minimally responsive .  Will get ammonia level, abg and EEG to r/o absence seizure  with unarousability. This am not alert and barely arousable.    The patient was hypothermic last night and required warming blankets. 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 He has now completed Ertapenem for a resistant Proteus Infection.  Awake this morning and speaking with his son. He is coherent and asking for food. DNR/DNI now in effect; at family request.

## 2019-12-11 NOTE — PROGRESS NOTE ADULT - SUBJECTIVE AND OBJECTIVE BOX
89 year old male with pmhx AFib, UTI, dementia, hypercholesteremia, HTN, CAD and pshx PCI x2, hernia repair presents to the ED s/p recent discharge from [place] x2 days ago for UTI. Pt was sent in from nursing home for abnormal labs and lethargy. Reported that since yesterday, pt was more lethargic. Today, the pt had labs conducted at the nursing home, with unspecified abnormal lab values. Subsequently referred to [facility] ED, found to be hypoxic by EMS to spO2 90%, placed on 3L NC, responsive to voice. Per nursing home, pt is usually able to have a conversation and is alert. Denies fever. Upon review of lab results brought by EMS, only abnormality on CBC and CMP is a hemoglobin value of 10.9. Patient with a hx of worsening multi infarct dementia. Has had multiple episode of waxing and waning mental status Pt still very lethargic. Slightly more alert this am.       MEDICATIONS  (STANDING):  amLODIPine   Tablet 10 milliGRAM(s) Oral daily  dextrose 5% + sodium chloride 0.45%. 1000 milliLiter(s) (50 mL/Hr) IV Continuous <Continuous>  finasteride 5 milliGRAM(s) Oral daily  rivastigmine patch  9.5 mG/24 Hr(s) 1 Patch Transdermal every 24 hours  tamsulosin 0.4 milliGRAM(s) Oral at bedtime    MEDICATIONS  (PRN):  acetaminophen    Suspension .. 650 milliGRAM(s) Oral every 4 hours PRN Temp greater or equal to 38C (100.4F), Moderate Pain (4 - 6)  albuterol/ipratropium for Nebulization 3 milliLiter(s) Nebulizer every 6 hours PRN Shortness of Breath and/or Wheezing  polyethylene glycol 3350 17 Gram(s) Oral at bedtime PRN Constipation          VITALS:   T(C): 36.4 (12-11-19 @ 11:51), Max: 36.9 (12-10-19 @ 21:09)  HR: 59 (12-11-19 @ 11:51) (59 - 68)  BP: 147/82 (12-11-19 @ 11:51) (133/77 - 152/79)  RR: 18 (12-11-19 @ 11:51) (18 - 18)  PHYSICAL EXAM:  	GENERAL: NAD, well nourished  	HEAD:  Atraumatic  	EYES: EOM, PERRLA, conjunctiva pink and sclera white  	ENT: No tonsillar erythema, exudates, or enlargement, moist mucous membranes, good dentition, no lesions  	NECK: Supple, No JVD, normal thyroid, carotids with normal upstrokes and no bruits  	CHEST/LUNG: Clear to auscultation bilaterally, No rales, rhonchi, wheezing, or rubs  	HEART: Regular rate and rhythm, No murmurs, rubs, or gallops  	ABDOMEN: Soft, nondistended, no masses, guarding, tenderness or rebound, bowel sounds present  	EXTREMITIES:  2+ Peripheral Pulses, No clubbing, cyanosis, or edema.   	LYMPH: No lymphadenopathy noted  	SKIN: No rashes or lesions  NERVOUS SYSTEM:  confused minimally responsiveSpO2: 96% (12-11-19 @ 11:51) (96% - 98%)  Wt(kg): --        LABS:        CBC Full  -  ( 11 Dec 2019 08:22 )  WBC Count : 7.45 K/uL  RBC Count : 3.83 M/uL  Hemoglobin : 11.1 g/dL  Hematocrit : 34.4 %  Platelet Count - Automated : 199 K/uL  Mean Cell Volume : 89.8 fl  Mean Cell Hemoglobin : 29.0 pg  Mean Cell Hemoglobin Concentration : 32.3 gm/dL  Auto Neutrophil # : x  Auto Lymphocyte # : x  Auto Monocyte # : x  Auto Eosinophil # : x  Auto Basophil # : x  Auto Neutrophil % : x  Auto Lymphocyte % : x  Auto Monocyte % : x  Auto Eosinophil % : x  Auto Basophil % : x    12-11    138  |  102  |  16  ----------------------------<  112<H>  3.9   |  23  |  1.05    Ca    9.3      11 Dec 2019 06:07            CAPILLARY BLOOD GLUCOSE          RADIOLOGY & ADDITIONAL TESTS:

## 2019-12-12 LAB
ANION GAP SERPL CALC-SCNC: 12 MMOL/L — SIGNIFICANT CHANGE UP (ref 5–17)
BUN SERPL-MCNC: 22 MG/DL — SIGNIFICANT CHANGE UP (ref 7–23)
CALCIUM SERPL-MCNC: 9.1 MG/DL — SIGNIFICANT CHANGE UP (ref 8.4–10.5)
CHLORIDE SERPL-SCNC: 102 MMOL/L — SIGNIFICANT CHANGE UP (ref 96–108)
CO2 SERPL-SCNC: 25 MMOL/L — SIGNIFICANT CHANGE UP (ref 22–31)
CREAT SERPL-MCNC: 1.21 MG/DL — SIGNIFICANT CHANGE UP (ref 0.5–1.3)
GLUCOSE BLDC GLUCOMTR-MCNC: 127 MG/DL — HIGH (ref 70–99)
GLUCOSE BLDC GLUCOMTR-MCNC: 168 MG/DL — HIGH (ref 70–99)
GLUCOSE SERPL-MCNC: 116 MG/DL — HIGH (ref 70–99)
HCT VFR BLD CALC: 33.9 % — LOW (ref 39–50)
HGB BLD-MCNC: 10.9 G/DL — LOW (ref 13–17)
MCHC RBC-ENTMCNC: 29.1 PG — SIGNIFICANT CHANGE UP (ref 27–34)
MCHC RBC-ENTMCNC: 32.2 GM/DL — SIGNIFICANT CHANGE UP (ref 32–36)
MCV RBC AUTO: 90.4 FL — SIGNIFICANT CHANGE UP (ref 80–100)
PLATELET # BLD AUTO: 204 K/UL — SIGNIFICANT CHANGE UP (ref 150–400)
POTASSIUM SERPL-MCNC: 4 MMOL/L — SIGNIFICANT CHANGE UP (ref 3.5–5.3)
POTASSIUM SERPL-SCNC: 4 MMOL/L — SIGNIFICANT CHANGE UP (ref 3.5–5.3)
RBC # BLD: 3.75 M/UL — LOW (ref 4.2–5.8)
RBC # FLD: 14.6 % — HIGH (ref 10.3–14.5)
SODIUM SERPL-SCNC: 139 MMOL/L — SIGNIFICANT CHANGE UP (ref 135–145)
WBC # BLD: 8.11 K/UL — SIGNIFICANT CHANGE UP (ref 3.8–10.5)
WBC # FLD AUTO: 8.11 K/UL — SIGNIFICANT CHANGE UP (ref 3.8–10.5)

## 2019-12-12 RX ADMIN — TAMSULOSIN HYDROCHLORIDE 0.4 MILLIGRAM(S): 0.4 CAPSULE ORAL at 23:01

## 2019-12-12 RX ADMIN — RIVASTIGMINE 1 PATCH: 4.6 PATCH, EXTENDED RELEASE TRANSDERMAL at 05:57

## 2019-12-12 RX ADMIN — RIVASTIGMINE 1 PATCH: 4.6 PATCH, EXTENDED RELEASE TRANSDERMAL at 08:53

## 2019-12-12 RX ADMIN — AMLODIPINE BESYLATE 10 MILLIGRAM(S): 2.5 TABLET ORAL at 05:57

## 2019-12-12 RX ADMIN — SODIUM CHLORIDE 50 MILLILITER(S): 9 INJECTION, SOLUTION INTRAVENOUS at 23:01

## 2019-12-12 RX ADMIN — FINASTERIDE 5 MILLIGRAM(S): 5 TABLET, FILM COATED ORAL at 11:55

## 2019-12-12 RX ADMIN — RIVASTIGMINE 1 PATCH: 4.6 PATCH, EXTENDED RELEASE TRANSDERMAL at 22:59

## 2019-12-12 RX ADMIN — SODIUM CHLORIDE 50 MILLILITER(S): 9 INJECTION, SOLUTION INTRAVENOUS at 08:54

## 2019-12-12 NOTE — CHART NOTE - NSCHARTNOTEFT_GEN_A_CORE
Attempted to contact family over last two days to set up family meeting, voicemails left. If family available, we conduct meetings from 10AM to 3:30PM Monday to Friday. Will continue to attempt to reach out to family.    Sergey Samano MD  Palliative Medicine  [phone] | [phone]

## 2019-12-12 NOTE — PROGRESS NOTE ADULT - ASSESSMENT
89 yo male recently admitted to [facility] for hematuria presents with a change in mental status. Patient found to be very lethargic and unable to ambulate. In ED Patient was found to have a + UA with suggested Urinary tract infection. Patient very lethargic yesterday.  Alert and responsive today. Patient with waxing and waning mental status. Very alert earlier in the past and now  minimally responsive .  Will get ammonia level, abg and EEG to r/o absence seizure  with unarousability. This am not alert and barely arousable.    The patient was hypothermic last night and required warming blankets. 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 He has now completed Ertapenem for a resistant Proteus Infection.  Awake this morning and speaking with his son. He is coherent and asking for food. DNR/DNI now in effect; at family request. will need to discuss placement with family

## 2019-12-12 NOTE — PROGRESS NOTE ADULT - SUBJECTIVE AND OBJECTIVE BOX
89 year old male with pmhx AFib, UTI, dementia, hypercholesteremia, HTN, CAD and pshx PCI x2, hernia repair presents to the ED s/p recent discharge from [place] x2 days ago for UTI. Pt was sent in from nursing home for abnormal labs and lethargy. Reported that since yesterday, pt was more lethargic. Today, the pt had labs conducted at the nursing home, with unspecified abnormal lab values. Subsequently referred to [facility] ED, found to be hypoxic by EMS to spO2 90%, placed on 3L NC, responsive to voice. Per nursing home, pt is usually able to have a conversation and is alert. Denies fever. Upon review of lab results brought by EMS, only abnormality on CBC and CMP is a hemoglobin value of 10.9. Patient with a hx of worsening multi infarct dementia. Has had multiple episode of waxing and waning mental status Pt still very lethargic. Slightly more alert this am.       MEDICATIONS  (STANDING):  amLODIPine   Tablet 10 milliGRAM(s) Oral daily  dextrose 5% + sodium chloride 0.45%. 1000 milliLiter(s) (50 mL/Hr) IV Continuous <Continuous>  finasteride 5 milliGRAM(s) Oral daily  rivastigmine patch  9.5 mG/24 Hr(s) 1 Patch Transdermal every 24 hours  tamsulosin 0.4 milliGRAM(s) Oral at bedtime    MEDICATIONS  (PRN):  acetaminophen    Suspension .. 650 milliGRAM(s) Oral every 4 hours PRN Temp greater or equal to 38C (100.4F), Moderate Pain (4 - 6)  albuterol/ipratropium for Nebulization 3 milliLiter(s) Nebulizer every 6 hours PRN Shortness of Breath and/or Wheezing  polyethylene glycol 3350 17 Gram(s) Oral at bedtime PRN Constipation          VITALS:   T(C): 36.2 (12-12-19 @ 12:25), Max: 36.7 (12-11-19 @ 20:48)  HR: 50 (12-12-19 @ 12:25) (50 - 61)  BP: 129/75 (12-12-19 @ 12:25) (113/73 - 131/81)  RR: 18 (12-12-19 @ 12:25) (18 - 18)  SpO2: 98% (12-12-19 @ 12:25) (97% - 98%)  Wt(kg): --    PHYSICAL EXAM:  	GENERAL: NAD, well nourished  	HEAD:  Atraumatic  	EYES: EOM, PERRLA, conjunctiva pink and sclera white  	ENT: No tonsillar erythema, exudates, or enlargement, moist mucous membranes, good dentition, no lesions  	NECK: Supple, No JVD, normal thyroid, carotids with normal upstrokes and no bruits  	CHEST/LUNG: Clear to auscultation bilaterally, No rales, rhonchi, wheezing, or rubs  	HEART: Regular rate and rhythm, No murmurs, rubs, or gallops  	ABDOMEN: Soft, nondistended, no masses, guarding, tenderness or rebound, bowel sounds present  	EXTREMITIES:  2+ Peripheral Pulses, No clubbing, cyanosis, or edema.   	LYMPH: No lymphadenopathy noted  	SKIN: No rashes or lesions  NERVOUS SYSTEM:  confused minimally responsive    LABS:        CBC Full  -  ( 12 Dec 2019 08:28 )  WBC Count : 8.11 K/uL  RBC Count : 3.75 M/uL  Hemoglobin : 10.9 g/dL  Hematocrit : 33.9 %  Platelet Count - Automated : 204 K/uL  Mean Cell Volume : 90.4 fl  Mean Cell Hemoglobin : 29.1 pg  Mean Cell Hemoglobin Concentration : 32.2 gm/dL  Auto Neutrophil # : x  Auto Lymphocyte # : x  Auto Monocyte # : x  Auto Eosinophil # : x  Auto Basophil # : x  Auto Neutrophil % : x  Auto Lymphocyte % : x  Auto Monocyte % : x  Auto Eosinophil % : x  Auto Basophil % : x    12-12    139  |  102  |  22  ----------------------------<  116<H>  4.0   |  25  |  1.21    Ca    9.1      12 Dec 2019 06:13            CAPILLARY BLOOD GLUCOSE          RADIOLOGY & ADDITIONAL TESTS:

## 2019-12-12 NOTE — PROGRESS NOTE ADULT - PROBLEM SELECTOR PLAN 2
MS continues to wax and wanes  possibly due to underlying dementia  will continue to monitor mental status  will ask palliative care to see Patient

## 2019-12-13 LAB
ANION GAP SERPL CALC-SCNC: 11 MMOL/L — SIGNIFICANT CHANGE UP (ref 5–17)
BUN SERPL-MCNC: 19 MG/DL — SIGNIFICANT CHANGE UP (ref 7–23)
CALCIUM SERPL-MCNC: 8.6 MG/DL — SIGNIFICANT CHANGE UP (ref 8.4–10.5)
CHLORIDE SERPL-SCNC: 103 MMOL/L — SIGNIFICANT CHANGE UP (ref 96–108)
CO2 SERPL-SCNC: 24 MMOL/L — SIGNIFICANT CHANGE UP (ref 22–31)
CREAT SERPL-MCNC: 1.08 MG/DL — SIGNIFICANT CHANGE UP (ref 0.5–1.3)
GLUCOSE BLDC GLUCOMTR-MCNC: 108 MG/DL — HIGH (ref 70–99)
GLUCOSE SERPL-MCNC: 105 MG/DL — HIGH (ref 70–99)
HCT VFR BLD CALC: 31.2 % — LOW (ref 39–50)
HGB BLD-MCNC: 10 G/DL — LOW (ref 13–17)
MCHC RBC-ENTMCNC: 28.7 PG — SIGNIFICANT CHANGE UP (ref 27–34)
MCHC RBC-ENTMCNC: 32.1 GM/DL — SIGNIFICANT CHANGE UP (ref 32–36)
MCV RBC AUTO: 89.4 FL — SIGNIFICANT CHANGE UP (ref 80–100)
PLATELET # BLD AUTO: 196 K/UL — SIGNIFICANT CHANGE UP (ref 150–400)
POTASSIUM SERPL-MCNC: 3.9 MMOL/L — SIGNIFICANT CHANGE UP (ref 3.5–5.3)
POTASSIUM SERPL-SCNC: 3.9 MMOL/L — SIGNIFICANT CHANGE UP (ref 3.5–5.3)
RBC # BLD: 3.49 M/UL — LOW (ref 4.2–5.8)
RBC # FLD: 14.6 % — HIGH (ref 10.3–14.5)
SODIUM SERPL-SCNC: 138 MMOL/L — SIGNIFICANT CHANGE UP (ref 135–145)
WBC # BLD: 7.64 K/UL — SIGNIFICANT CHANGE UP (ref 3.8–10.5)
WBC # FLD AUTO: 7.64 K/UL — SIGNIFICANT CHANGE UP (ref 3.8–10.5)

## 2019-12-13 PROCEDURE — 99233 SBSQ HOSP IP/OBS HIGH 50: CPT

## 2019-12-13 RX ORDER — ACETAMINOPHEN 500 MG
650 TABLET ORAL EVERY 6 HOURS
Refills: 0 | Status: DISCONTINUED | OUTPATIENT
Start: 2019-12-13 | End: 2019-12-17

## 2019-12-13 RX ADMIN — FINASTERIDE 5 MILLIGRAM(S): 5 TABLET, FILM COATED ORAL at 11:15

## 2019-12-13 RX ADMIN — Medication 650 MILLIGRAM(S): at 20:00

## 2019-12-13 RX ADMIN — TAMSULOSIN HYDROCHLORIDE 0.4 MILLIGRAM(S): 0.4 CAPSULE ORAL at 22:53

## 2019-12-13 RX ADMIN — RIVASTIGMINE 1 PATCH: 4.6 PATCH, EXTENDED RELEASE TRANSDERMAL at 20:52

## 2019-12-13 RX ADMIN — RIVASTIGMINE 1 PATCH: 4.6 PATCH, EXTENDED RELEASE TRANSDERMAL at 06:05

## 2019-12-13 RX ADMIN — AMLODIPINE BESYLATE 10 MILLIGRAM(S): 2.5 TABLET ORAL at 06:01

## 2019-12-13 RX ADMIN — RIVASTIGMINE 1 PATCH: 4.6 PATCH, EXTENDED RELEASE TRANSDERMAL at 06:01

## 2019-12-13 RX ADMIN — SODIUM CHLORIDE 50 MILLILITER(S): 9 INJECTION, SOLUTION INTRAVENOUS at 22:54

## 2019-12-13 RX ADMIN — Medication 650 MILLIGRAM(S): at 19:14

## 2019-12-13 RX ADMIN — RIVASTIGMINE 1 PATCH: 4.6 PATCH, EXTENDED RELEASE TRANSDERMAL at 07:53

## 2019-12-13 RX ADMIN — SODIUM CHLORIDE 50 MILLILITER(S): 9 INJECTION, SOLUTION INTRAVENOUS at 07:53

## 2019-12-13 NOTE — PROGRESS NOTE ADULT - SUBJECTIVE AND OBJECTIVE BOX
HPI: 89M with PMH including dementia, AF, HLD, HTN, and CAD s/p PCI x 2, here from SNF with abnormal labs and lethargy, after recent d/c from [facility] for UTI. Has had waxing and waning mental status during his admission thus far, as well as hypothermia requiring warming blankets; concern exists that his cognitive impairment may not improve. Was also on ertapenem for Proteus infection, now s/p abx. Palliative care called to discuss GOC.     INTERVAL EVENTS:  12/13: patient appears comfortable, doesn't endorse any complaints    ADVANCE DIRECTIVES:    DNR [X]  MOLST  [ ]    Living Will  [ ]   DECISION MAKER(s):  [ ] Health Care Proxy(s)  [ ] Surrogate(s)  [ ] Guardian           Name(s) and Contact(s): daughter Wendy Goldberg [phone]    BASELINE (I)ADL(s) (prior to admission):  [place]: [ ]Total  [ ] Moderate [ ]Dependent    Allergies    No Known Allergies    Intolerances    morphine (Unknown)    MEDICATIONS  (STANDING):  amLODIPine   Tablet 10 milliGRAM(s) Oral daily  dextrose 5% + sodium chloride 0.45%. 1000 milliLiter(s) (50 mL/Hr) IV Continuous <Continuous>  finasteride 5 milliGRAM(s) Oral daily  rivastigmine patch  9.5 mG/24 Hr(s) 1 Patch Transdermal every 24 hours  tamsulosin 0.4 milliGRAM(s) Oral at bedtime    MEDICATIONS  (PRN):  acetaminophen    Suspension .. 650 milliGRAM(s) Oral every 4 hours PRN Temp greater or equal to 38C (100.4F), Moderate Pain (4 - 6)  albuterol/ipratropium for Nebulization 3 milliLiter(s) Nebulizer every 6 hours PRN Shortness of Breath and/or Wheezing  polyethylene glycol 3350 17 Gram(s) Oral at bedtime PRN Constipation    PRESENT SYMPTOMS:   Source if other than patient:  [ ]Family   [ ]Team     Pain (Impact on QOL):    Location -         Minimal acceptable level (0-10 scale):                    Aggravating factors -  Quality -  Radiation -  Severity (0-10 scale) -    Timing -    PAIN AD Score:     [email or web address] (press ctrl +  left click to view)    Dyspnea:                           [ ]Mild [ ]Moderate [ ]Severe  Anxiety:                             [ ]Mild [ ]Moderate [ ]Severe  Fatigue:                             [ ]Mild [ ]Moderate [ ]Severe  Nausea:                             [ ]Mild [ ]Moderate [ ]Severe  Loss of appetite:              [ ]Mild [ ]Moderate [ ]Severe  Constipation:                    [ ]Mild [ ]Moderate [ ]Severe    Other Symptoms:  [ X]All other review of systems negative   [ ]Unable to obtain due to poor mentation     Karnofsky Performance Score/Palliative Performance Status Version 2:         %    PHYSICAL EXAM:  Vital Signs Last 24 Hrs  T(C): 36.2 (13 Dec 2019 12:03), Max: 36.6 (12 Dec 2019 21:32)  T(F): 97.2 (13 Dec 2019 12:03), Max: 97.8 (12 Dec 2019 21:32)  HR: 62 (13 Dec 2019 12:03) (57 - 62)  BP: 110/68 (13 Dec 2019 12:03) (110/68 - 129/75)  BP(mean): --  RR: 18 (13 Dec 2019 12:03) (18 - 18)  SpO2: 95% (13 Dec 2019 12:03) (95% - 98%)    GENERAL:  [ ]Alert  [ ]Oriented x   [ ]Lethargic  [ ]Cachexia  [ ]Unarousable  [X ]Verbal  [ ]Non-Verbal    Behavioral:   [ ] Anxiety  [ ] Delirium [ ] Agitation [ ] Other    HEENT:  [ X]Normal   [ ]Dry mouth   [ ]ET Tube/Trach  [ ]Oral lesions    PULMONARY:   [X ]Clear [ ]Tachypnea  [ ]Audible excessive secretions   [ ]Rhonchi        [ ]Right [ ]Left [ ]Bilateral  [ ]Crackles        [ ]Right [ ]Left [ ]Bilateral  [ ]Wheezing     [ ]Right [ ]Left [ ]Bilateral    CARDIOVASCULAR:    [X ]Regular [ ]Irregular [ ]Tachy  [ ]Yosvany [ ]Murmur [ ]Other    GASTROINTESTINAL:  [X ]Soft  [ ]Distended   [X ]+BS  [X ]Non tender [ ]Tender  [ ]PEG [ ]OGT/ NGT  Last BM:     GENITOURINARY:  [ ]Normal [ ] Incontinent   [ ]Oliguria/Anuria   [ ]Mai    MUSCULOSKELETAL:   [ ]Normal   [ ]Weakness  [ ]Bed/Wheelchair bound [ ]Edema    NEUROLOGIC:   [ ]No focal deficits  [ ] Cognitive impairment  [ ] Dysphagia [ ]Dysarthria [ ] Paresis [ ]Other     SKIN:   [ ]Normal   [ ]Pressure ulcer(s)  [ ]Rash    CRITICAL CARE:  [ ] Shock Present  [ ]Septic [ ]Cardiogenic [ ]Neurologic [ ]Hypovolemic  [ ]  Vasopressors [ ]  Inotropes   [ ] Respiratory failure present  [ ] Acute  [ ] Chronic [ ] Hypoxic  [ ] Hypercarbic [ ] Other  [ ] Other organ failure     LABS: reviewed                           10.0   7.64  )-----------( 196      ( 13 Dec 2019 08:39 )             31.2   12-13    138  |  103  |  19  ----------------------------<  105<H>  3.9   |  24  |  1.08    Ca    8.6      13 Dec 2019 05:43      RADIOLOGY & ADDITIONAL STUDIES:    CXR 12/8/19    Left basilar atelectasis. Otherwise, clear lungs.    PROTEIN CALORIE MALNUTRITION:   [ ] PPSV2 < or = to 30% [ ] significant weight loss  [ ] poor nutritional intake [ ] catabolic state [ ] anasarca     Albumin, Serum: 3.7 g/dL (12-06-19 @ 17:42)  Artificial Nutrition [ ]     REFERRALS:   [ ]Chaplaincy  [ ] Hospice  [ ]Child Life  [ ]Social Work  [ ]Case management [ ]Holistic Therapy     Goals of Care Discussion Document:     ........ ....... ...... ..... .... ... .. .  Sergey Samano MD  Palliative Medicine  office: [phone] | [phone]  pager: [phone]

## 2019-12-13 NOTE — PROGRESS NOTE ADULT - SUBJECTIVE AND OBJECTIVE BOX
89 year old male with pmhx AFib, UTI, dementia, hypercholesteremia, HTN, CAD and pshx PCI x2, hernia repair presents to the ED s/p recent discharge from [place] x2 days ago for UTI. Pt was sent in from nursing home for abnormal labs and lethargy. Reported that since yesterday, pt was more lethargic. Today, the pt had labs conducted at the nursing home, with unspecified abnormal lab values. Subsequently referred to [facility] ED, found to be hypoxic by EMS to spO2 90%, placed on 3L NC, responsive to voice. Per nursing home, pt is usually able to have a conversation and is alert. Denies fever. Upon review of lab results brought by EMS, only abnormality on CBC and CMP is a hemoglobin value of 10.9. Patient with a hx of worsening multi infarct dementia. Has had multiple episode of waxing and waning mental status Pt still very lethargic. Slightly more alert this am. He is doing very well tonight  with son present . Family wants to have [facility] discharged [facility].  Patient is alert and talking tonight knows who the president is and the name of his son.      MEDICATIONS  (STANDING):  amLODIPine   Tablet 10 milliGRAM(s) Oral daily  dextrose 5% + sodium chloride 0.45%. 1000 milliLiter(s) (50 mL/Hr) IV Continuous <Continuous>  finasteride 5 milliGRAM(s) Oral daily  rivastigmine patch  9.5 mG/24 Hr(s) 1 Patch Transdermal every 24 hours  tamsulosin 0.4 milliGRAM(s) Oral at bedtime    MEDICATIONS  (PRN):  acetaminophen    Suspension .. 650 milliGRAM(s) Oral every 4 hours PRN Temp greater or equal to 38C (100.4F), Moderate Pain (4 - 6)  albuterol/ipratropium for Nebulization 3 milliLiter(s) Nebulizer every 6 hours PRN Shortness of Breath and/or Wheezing  polyethylene glycol 3350 17 Gram(s) Oral at bedtime PRN Constipation      Vital Signs Last 24 Hrs  T(C): 36.2 (13 Dec 2019 12:03), Max: 36.6 (12 Dec 2019 21:32)  T(F): 97.2 (13 Dec 2019 12:03), Max: 97.8 (12 Dec 2019 21:32)  HR: 68 (13 Dec 2019 18:10) (57 - 68)  BP: 123/72 (13 Dec 2019 18:10) (110/68 - 129/75)  BP(mean): --  RR: 18 (13 Dec 2019 12:03) (18 - 18)  SpO2: 97% (13 Dec 2019 18:10) (95% - 98%)        PHYSICAL EXAM:  	GENERAL: NAD, well nourished  	HEAD:  Atraumatic  	EYES: EOM, PERRLA, conjunctiva pink and sclera white  	ENT: No tonsillar erythema, exudates, or enlargement, moist mucous membranes, good dentition, no lesions  	NECK: Supple, No JVD, normal thyroid, carotids with normal upstrokes and no bruits  	CHEST/LUNG: Clear to auscultation bilaterally, No rales, rhonchi, wheezing, or rubs  	HEART: Regular rate and rhythm, No murmurs, rubs, or gallops  	ABDOMEN: Soft, nondistended, no masses, guarding, tenderness or rebound, bowel sounds present  	EXTREMITIES:  2+ Peripheral Pulses, No clubbing, cyanosis, or edema.   	LYMPH: No lymphadenopathy noted  	SKIN: No rashes or lesions no breakdown.  NERVOUS SYSTEM:  confused minimally responsive    LABS:          CBC Full  -  ( 13 Dec 2019 08:39 )  WBC Count : 7.64 K/uL  RBC Count : 3.49 M/uL  Hemoglobin : 10.0 g/dL  Hematocrit : 31.2 %  Platelet Count - Automated : 196 K/uL  Mean Cell Volume : 89.4 fl  Mean Cell Hemoglobin : 28.7 pg  Mean Cell Hemoglobin Concentration : 32.1 gm/dL  Auto Neutrophil # : x  Auto Lymphocyte # : x  Auto Monocyte # : x  Auto Eosinophil # : x  Auto Basophil # : x  Auto Neutrophil % : x  Auto Lymphocyte % : x  Auto Monocyte % : x  Auto Eosinophil % : x  Auto Basophil % : x    12-13    138  |  103  |  19  ----------------------------<  105<H>  3.9   |  24  |  1.08    Ca    8.6      13 Dec 2019 05:43              CBC Full  -  ( 12 Dec 2019 08:28 )  WBC Count : 8.11 K/uL  RBC Count : 3.75 M/uL  Hemoglobin : 10.9 g/dL  Hematocrit : 33.9 %  Platelet Count - Automated : 204 K/uL  Mean Cell Volume : 90.4 fl  Mean Cell Hemoglobin : 29.1 pg  Mean Cell Hemoglobin Concentration : 32.2 gm/dL  Auto Neutrophil # : x  Auto Lymphocyte # : x  Auto Monocyte # : x  Auto Eosinophil # : x  Auto Basophil # : x  Auto Neutrophil % : x  Auto Lymphocyte % : x  Auto Monocyte % : x  Auto Eosinophil % : x  Auto Basophil % : x    12-12    139  |  102  |  22  ----------------------------<  116<H>  4.0   |  25  |  1.21    Ca    9.1      12 Dec 2019 06:13            CAPILLARY BLOOD GLUCOSE          RADIOLOGY & ADDITIONAL TESTS:

## 2019-12-13 NOTE — PROGRESS NOTE ADULT - ASSESSMENT
89M with PMH including dementia, AF, HLD, HTN, and CAD s/p PCI x 2, here from SNF with abnormal labs and lethargy, after recent d/c from [facility] for UTI. Has had waxing and waning mental status during his admission thus far, as well as hypothermia requiring warming blankets; concern exists that his cognitive impairment may not improve. Was also on ertapenem for Proteus infection, now s/p abx. Palliative care called to discuss GOC.

## 2019-12-13 NOTE — CHART NOTE - NSCHARTNOTEFT_GEN_A_CORE
Nutrition Follow Up Note  Patient seen for severe malnutrition follow-up. Per chart, 89 y/o male recently admitted and discharged, presenting from rehab for abnormal labs and lethargy. Palliative care and swallow evaluation consulted. "Obtundation, hypothermia and loss of cognitive function c/w multi infarct dementia and will likely be irreversible." Pt is now DNR/DNI, family requesting resumption of dysphagia diet. Palliative care is following.   PMH: a fib, dementia, HTN, CAD    Source:  Pt, RN, EMR. Pt with limited information, pt is confused.    Diet :  Dysphagia 3 Nectar-consistency    RN endorses pt consumes ~50% of meals daily, flow sheets indicate 70% intake. No BM documented this admission, RN confirms no recent BM. Pt is ordered for miralax, recommend to closely monitor bowel function/ add to bowel regimen. Pt endorses enjoying Ensure Enlive PTA and would like to receive Ensure while in the hospital. RD assisted pt with menu selections.    PO intake : fair, 50-70%    Daily Weight in k (12-11), Weight in k.8 (12-10), Weight in k.3 (12-10), Weight in k.4 (12-07)  % Weight Change    Pertinent Medications: MEDICATIONS  (STANDING):  amLODIPine   Tablet 10 milliGRAM(s) Oral daily  dextrose 5% + sodium chloride 0.45%. 1000 milliLiter(s) (50 mL/Hr) IV Continuous <Continuous>  finasteride 5 milliGRAM(s) Oral daily  rivastigmine patch  9.5 mG/24 Hr(s) 1 Patch Transdermal every 24 hours  tamsulosin 0.4 milliGRAM(s) Oral at bedtime    MEDICATIONS  (PRN):  acetaminophen    Suspension .. 650 milliGRAM(s) Oral every 4 hours PRN Temp greater or equal to 38C (100.4F), Moderate Pain (4 - 6)  albuterol/ipratropium for Nebulization 3 milliLiter(s) Nebulizer every 6 hours PRN Shortness of Breath and/or Wheezing  polyethylene glycol 3350 17 Gram(s) Oral at bedtime PRN Constipation    Pertinent Labs:  @ 05:43: Na 138, BUN 19, Cr 1.08, <H>, K+ 3.9, Phos --, Mg --, Alk Phos --, ALT/SGPT --, AST/SGOT --, HbA1c --    Finger Sticks:  POCT Blood Glucose.: 108 mg/dL ( @ 07:48)  POCT Blood Glucose.: 168 mg/dL ( @ 21:54)  POCT Blood Glucose.: 127 mg/dL ( @ 16:41)      Skin per nursing documentation: IAD  Edema: 1+ generalized    Estimated Needs:   [x] no change since previous assessment  Estimated Energy Needs (25-30 calories/kg):  kcal/d  Estimated Protein Needs (0.75-1.0 gm/kg): 60-80 g/d  Estimated Fluid Needs (25-30 ml/kg):  mL/d  [ ] recalculated:     Previous Nutrition Diagnosis: severe malnutrition  Nutrition Diagnosis is: ongoing, addressed with PO dysphagia 3 diet.    New Nutrition Diagnosis: none    Interventions: Ensure Enlive NC x2 daily (350kcal and 20g protein per carton), bowel regimen    Recommend  1) Recommend continue dysphagia diet with nectar consistency as per family and pt request.   2) Monitor BMs, increase bowel regimen  3) Pt aware RD remains available for any concerns, questions or diet preferences     Monitoring and Evaluation:   Continue to monitor Nutritional intake, Tolerance to diet prescription, weights, labs, skin integrity    Delilah Urena RD  Pager [phone]  RD remains available upon request and will follow up per protocol

## 2019-12-13 NOTE — PROGRESS NOTE ADULT - ASSESSMENT
89 yo male recently admitted to [facility] for hematuria presents with a change in mental status. Patient found to be very lethargic and unable to ambulate. In ED Patient was found to have a + UA with suggested Urinary tract infection. Patient very lethargic yesterday.  Alert and responsive today. Patient with waxing and waning mental status. Very alert earlier in the past and now  minimally responsive .  Will get ammonia level, abg and EEG to r/o absence seizure  with unarousability. This am not alert and barely arousable.    The patient was hypothermic last night and required warming blankets. 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 He has now completed Ertapenem for a resistant Proteus Infection.  Awake this morning and speaking with his son. He is coherent and asking for food. DNR/DNI now in effect; at family request. will need to discuss placement with family after [facility].

## 2019-12-14 RX ADMIN — AMLODIPINE BESYLATE 10 MILLIGRAM(S): 2.5 TABLET ORAL at 05:32

## 2019-12-14 RX ADMIN — RIVASTIGMINE 1 PATCH: 4.6 PATCH, EXTENDED RELEASE TRANSDERMAL at 20:45

## 2019-12-14 RX ADMIN — RIVASTIGMINE 1 PATCH: 4.6 PATCH, EXTENDED RELEASE TRANSDERMAL at 08:17

## 2019-12-14 RX ADMIN — FINASTERIDE 5 MILLIGRAM(S): 5 TABLET, FILM COATED ORAL at 13:11

## 2019-12-14 RX ADMIN — RIVASTIGMINE 1 PATCH: 4.6 PATCH, EXTENDED RELEASE TRANSDERMAL at 05:33

## 2019-12-14 RX ADMIN — SODIUM CHLORIDE 50 MILLILITER(S): 9 INJECTION, SOLUTION INTRAVENOUS at 08:18

## 2019-12-14 RX ADMIN — TAMSULOSIN HYDROCHLORIDE 0.4 MILLIGRAM(S): 0.4 CAPSULE ORAL at 22:21

## 2019-12-14 RX ADMIN — RIVASTIGMINE 1 PATCH: 4.6 PATCH, EXTENDED RELEASE TRANSDERMAL at 05:32

## 2019-12-14 NOTE — PROGRESS NOTE ADULT - ASSESSMENT
89 yo male recently admitted to [facility] for hematuria presents with a change in mental status. Patient found to be very lethargic and unable to ambulate. In ED Patient was found to have a + UA with suggested Urinary tract infection. Patient very lethargic yesterday.  Alert and responsive today. Patient with waxing and waning mental status. Very alert earlier in the past and now  minimally responsive .  Will get ammonia level, abg and EEG to r/o absence seizure  with unarousability. This am not alert and barely arousable.    The patient was hypothermic last night and required warming blankets. 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 He has now completed Ertapenem for a resistant Proteus Infection.  Awake this morning and speaking with his son. He is coherent and asking for food. DNR/DNI now in effect; at family request. will need to discuss placement with family after [facility].

## 2019-12-14 NOTE — PROGRESS NOTE ADULT - SUBJECTIVE AND OBJECTIVE BOX
89 year old male with pmhx AFib, UTI, dementia, hypercholesteremia, HTN, CAD and pshx PCI x2, hernia repair presents to the ED s/p recent discharge from [facility] x2 days ago for UTI. Pt was sent in from nursing home for abnormal labs and lethargy. Reported that since yesterday, pt was more lethargic. Today, the pt had labs conducted at the nursing home, with unspecified abnormal lab values. Subsequently referred to [facility] ED, found to be hypoxic by EMS to spO2 90%, placed on 3L NC, responsive to voice. Per nursing home, pt is usually able to have a conversation and is alert. Denies fever. Upon review of lab results brought by EMS, only abnormality on CBC and CMP is a hemoglobin value of 10.9. Patient with a hx of worsening multi infarct dementia. Has had multiple episode of waxing and waning mental status Pt still very lethargic. Slightly more alert this am. He is doing very well tonight  with son present . Family wants to have patine discharged [facility].  Patient is alert and talking tonight knows who the president is and the name of his son.  Today alert but less sharp c/w yesterday       MEDICATIONS  (STANDING):  amLODIPine   Tablet 10 milliGRAM(s) Oral daily  dextrose 5% + sodium chloride 0.45%. 1000 milliLiter(s) (50 mL/Hr) IV Continuous <Continuous>  finasteride 5 milliGRAM(s) Oral daily  rivastigmine patch  9.5 mG/24 Hr(s) 1 Patch Transdermal every 24 hours  tamsulosin 0.4 milliGRAM(s) Oral at bedtime    MEDICATIONS  (PRN):  acetaminophen    Suspension .. 650 milliGRAM(s) Oral every 4 hours PRN Temp greater or equal to 38C (100.4F), Moderate Pain (4 - 6)  albuterol/ipratropium for Nebulization 3 milliLiter(s) Nebulizer every 6 hours PRN Shortness of Breath and/or Wheezing  polyethyl      BP(mean): --120/068  RR: 18   SpO2: 97%         PHYSICAL EXAM:  	GENERAL: NAD, well nourished  	HEAD:  Atraumatic  	EYES: EOM, PERRLA, conjunctiva pink and sclera white  	ENT: No tonsillar erythema, exudates, or enlargement, moist mucous membranes, good dentition, no lesions  	NECK: Supple, No JVD, normal thyroid, carotids with normal upstrokes and no bruits  	CHEST/LUNG: Clear to auscultation bilaterally, No rales, rhonchi, wheezing, or rubs  	HEART: Regular rate and rhythm, No murmurs, rubs, or gallops  	ABDOMEN: Soft, nondistended, no masses, guarding, tenderness or rebound, bowel sounds present  	EXTREMITIES:  2+ Peripheral Pulses, No clubbing, cyanosis, or edema.   	LYMPH: No lymphadenopathy noted  	SKIN: No rashes or lesions no breakdown.  NERVOUS SYSTEM:  confused minimally responsive    LABS:            CAPILLARY BLOOD GLUCOSE          RADIOLOGY & ADDITIONAL TESTS:

## 2019-12-15 RX ADMIN — Medication 1 DROP(S): at 22:17

## 2019-12-15 RX ADMIN — SODIUM CHLORIDE 50 MILLILITER(S): 9 INJECTION, SOLUTION INTRAVENOUS at 06:05

## 2019-12-15 RX ADMIN — RIVASTIGMINE 1 PATCH: 4.6 PATCH, EXTENDED RELEASE TRANSDERMAL at 06:03

## 2019-12-15 RX ADMIN — RIVASTIGMINE 1 PATCH: 4.6 PATCH, EXTENDED RELEASE TRANSDERMAL at 06:01

## 2019-12-15 RX ADMIN — AMLODIPINE BESYLATE 10 MILLIGRAM(S): 2.5 TABLET ORAL at 06:03

## 2019-12-15 RX ADMIN — RIVASTIGMINE 1 PATCH: 4.6 PATCH, EXTENDED RELEASE TRANSDERMAL at 21:04

## 2019-12-15 RX ADMIN — TAMSULOSIN HYDROCHLORIDE 0.4 MILLIGRAM(S): 0.4 CAPSULE ORAL at 22:06

## 2019-12-15 RX ADMIN — SODIUM CHLORIDE 50 MILLILITER(S): 9 INJECTION, SOLUTION INTRAVENOUS at 12:33

## 2019-12-15 RX ADMIN — FINASTERIDE 5 MILLIGRAM(S): 5 TABLET, FILM COATED ORAL at 12:33

## 2019-12-15 RX ADMIN — RIVASTIGMINE 1 PATCH: 4.6 PATCH, EXTENDED RELEASE TRANSDERMAL at 07:20

## 2019-12-15 NOTE — PROGRESS NOTE ADULT - ASSESSMENT
89 yo male recently admitted to [facility] for hematuria presents with a change in mental status. Patient found to be very lethargic and unable to ambulate. In ED Patient was found to have a + UA with suggested Urinary tract infection. Patient very lethargic yesterday.  Alert and responsive today. Patient with waxing and waning mental status. Very alert earlier in the past and now  minimally responsive .  Will get ammonia level, abg and EEG to r/o absence seizure  with unarousability. This am not alert and barely arousable.    The patient was hypothermic last night and required warming blankets. 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 He has now completed Ertapenem for a resistant Proteus Infection.  Awake this morning and speaking with his son. He is coherent and asking for food. DNR/DNI now in effect; at family request. will need to discuss placement with family after [facility].

## 2019-12-15 NOTE — PROGRESS NOTE ADULT - SUBJECTIVE AND OBJECTIVE BOX
89 year old male with pmhx AFib, UTI, dementia, hypercholesteremia, HTN, CAD and pshx PCI x2, hernia repair presents to the ED s/p recent discharge from [place] x2 days ago for UTI. Pt was sent in from nursing home for abnormal labs and lethargy. Reported that since yesterday, pt was more lethargic. Today, the pt had labs conducted at the nursing home, with unspecified abnormal lab values. Subsequently referred to [facility] ED, found to be hypoxic by EMS to spO2 90%, placed on 3L NC, responsive to voice. Per nursing home, pt is usually able to have a conversation and is alert. Denies fever. Upon review of lab results brought by EMS, only abnormality on CBC and CMP is a hemoglobin value of 10.9. Patient with a hx of worsening multi infarct dementia. Has had multiple episode of waxing and waning mental status Pt still very lethargic. Slightly more alert this am. He is doing very well tonight  with son present . Family wants to have pat[facility] discharged [facility].  Patient is alert and talking tonight knows who the president is and the name of his son.  Today alert but less sharp c/w yesterday .  Feels stronger and more talkative but has dementia.      MEDICATIONS  (STANDING):  amLODIPine   Tablet 10 milliGRAM(s) Oral daily  dextrose 5% + sodium chloride 0.45%. 1000 milliLiter(s) (50 mL/Hr) IV Continuous <Continuous>  finasteride 5 milliGRAM(s) Oral daily  rivastigmine patch  9.5 mG/24 Hr(s) 1 Patch Transdermal every 24 hours  tamsulosin 0.4 milliGRAM(s) Oral at bedtime    MEDICATIONS  (PRN):  acetaminophen    Suspension .. 650 milliGRAM(s) Oral every 4 hours PRN Temp greater or equal to 38C (100.4F), Moderate Pain (4 - 6)  albuterol/ipratropium for Nebulization 3 milliLiter(s) Nebulizer every 6 hours PRN Shortness of Breath and/or Wheezing  polyethyl      Vital Signs Last 24 Hrs  T(C): 36.6 (15 Dec 2019 20:54), Max: 36.8 (15 Dec 2019 04:13)  T(F): 97.9 (15 Dec 2019 20:54), Max: 98.3 (15 Dec 2019 04:13)  HR: 59 (15 Dec 2019 20:54) (59 - 62)  BP: 113/67 (15 Dec 2019 20:54) (106/69 - 121/69)  BP(mean): --  RR: 18 (15 Dec 2019 20:54) (18 - 18)  SpO2: 97% (15 Dec 2019 20:54) (93% - 97%)         PHYSICAL EXAM:  	GENERAL: NAD, well nourished  	HEAD:  Atraumatic  	EYES: EOM, PERRLA, conjunctiva pink and sclera white  	ENT: No tonsillar erythema, exudates, or enlargement, moist mucous membranes, good dentition, no lesions  	NECK: Supple, No JVD, normal thyroid, carotids with normal upstrokes and no bruits  	CHEST/LUNG: Clear to auscultation bilaterally, No rales, rhonchi, wheezing, or rubs  	HEART: Regular rate and rhythm, No murmurs, rubs, or gallops  	ABDOMEN: Soft, nondistended, no masses, guarding, tenderness or rebound, bowel sounds present  	EXTREMITIES:  2+ Peripheral Pulses, No clubbing, cyanosis, or edema.   	LYMPH: No lymphadenopathy noted  	SKIN: No rashes or lesions no breakdown.  NERVOUS SYSTEM:  confused minimally responsive    LABS:    NO labs 12/15        CAPILLARY BLOOD GLUCOSE          RADIOLOGY & ADDITIONAL TESTS:

## 2019-12-16 DIAGNOSIS — Z71.89 OTHER SPECIFIED COUNSELING: ICD-10-CM

## 2019-12-16 PROCEDURE — 99497 ADVNCD CARE PLAN 30 MIN: CPT

## 2019-12-16 PROCEDURE — 99498 ADVNCD CARE PLAN ADDL 30 MIN: CPT

## 2019-12-16 PROCEDURE — 99233 SBSQ HOSP IP/OBS HIGH 50: CPT

## 2019-12-16 RX ADMIN — Medication 1 DROP(S): at 07:01

## 2019-12-16 RX ADMIN — TAMSULOSIN HYDROCHLORIDE 0.4 MILLIGRAM(S): 0.4 CAPSULE ORAL at 22:58

## 2019-12-16 RX ADMIN — AMLODIPINE BESYLATE 10 MILLIGRAM(S): 2.5 TABLET ORAL at 07:00

## 2019-12-16 RX ADMIN — RIVASTIGMINE 1 PATCH: 4.6 PATCH, EXTENDED RELEASE TRANSDERMAL at 17:26

## 2019-12-16 RX ADMIN — FINASTERIDE 5 MILLIGRAM(S): 5 TABLET, FILM COATED ORAL at 14:07

## 2019-12-16 RX ADMIN — RIVASTIGMINE 1 PATCH: 4.6 PATCH, EXTENDED RELEASE TRANSDERMAL at 07:02

## 2019-12-16 RX ADMIN — SODIUM CHLORIDE 50 MILLILITER(S): 9 INJECTION, SOLUTION INTRAVENOUS at 17:31

## 2019-12-16 RX ADMIN — Medication 1 DROP(S): at 17:31

## 2019-12-16 RX ADMIN — RIVASTIGMINE 1 PATCH: 4.6 PATCH, EXTENDED RELEASE TRANSDERMAL at 07:01

## 2019-12-16 NOTE — PROGRESS NOTE ADULT - SUBJECTIVE AND OBJECTIVE BOX
89 year old male with pmhx AFib, UTI, dementia, hypercholesteremia, HTN, CAD and pshx PCI x2, hernia repair presents to the ED s/p recent discharge from [place] x2 days ago for UTI. Pt was sent in from nursing home for abnormal labs and lethargy. Reported that since yesterday, pt was more lethargic. Today, the pt had labs conducted at the nursing home, with unspecified abnormal lab values. Subsequently referred to [facility] ED, found to be hypoxic by EMS to spO2 90%, placed on 3L NC, responsive to voice. Per nursing home, pt is usually able to have a conversation and is alert. Denies fever. Upon review of lab results brought by EMS, only abnormality on CBC and CMP is a hemoglobin value of 10.9. Patient with a hx of worsening multi infarct dementia. Has had multiple episode of waxing and waning mental status Pt still very lethargic. Slightly more alert this am. He is doing very well tonight  with son present . Family wants to have [facility].  Patient is more awake and alert. answering questions appropriately. still confused at times          MEDICATIONS  (STANDING):  amLODIPine   Tablet 10 milliGRAM(s) Oral daily  artificial  tears Solution 1 Drop(s) Both EYES two times a day  dextrose 5% + sodium chloride 0.45%. 1000 milliLiter(s) (50 mL/Hr) IV Continuous <Continuous>  finasteride 5 milliGRAM(s) Oral daily  rivastigmine patch  9.5 mG/24 Hr(s) 1 Patch Transdermal every 24 hours  tamsulosin 0.4 milliGRAM(s) Oral at bedtime    MEDICATIONS  (PRN):  acetaminophen   Tablet .. 650 milliGRAM(s) Oral every 6 hours PRN Moderate Pain (4 - 6)  albuterol/ipratropium for Nebulization 3 milliLiter(s) Nebulizer every 6 hours PRN Shortness of Breath and/or Wheezing  polyethylene glycol 3350 17 Gram(s) Oral at bedtime PRN Constipation          VITALS:   T(C): 36.3 (12-16-19 @ 20:45), Max: 36.7 (12-16-19 @ 04:28)  HR: 70 (12-16-19 @ 20:45) (67 - 70)  BP: 146/75 (12-16-19 @ 20:45) (131/66 - 157/87)  RR: 18 (12-16-19 @ 20:45) (18 - 18)  SpO2: 98% (12-16-19 @ 20:45) (95% - 98%)  Wt(kg): --      PHYSICAL EXAM:  	GENERAL: NAD, well nourished  	HEAD:  Atraumatic  	EYES: EOM, PERRLA, conjunctiva pink and sclera white  	ENT: No tonsillar erythema, exudates, or enlargement, moist mucous membranes, good dentition, no lesions  	NECK: Supple, No JVD, normal thyroid, carotids with normal upstrokes and no bruits  	CHEST/LUNG: Clear to auscultation bilaterally, No rales, rhonchi, wheezing, or rubs  	HEART: Regular rate and rhythm, No murmurs, rubs, or gallops  	ABDOMEN: Soft, nondistended, no masses, guarding, tenderness or rebound, bowel sounds present  	EXTREMITIES:  2+ Peripheral Pulses, No clubbing, cyanosis, or edema.   	LYMPH: No lymphadenopathy noted  	SKIN: No rashes or lesions no breakdown.  NERVOUS SYSTEM:  confused minimally responsive    LABS:                      CAPILLARY BLOOD GLUCOSE          RADIOLOGY & ADDITIONAL TESTS:

## 2019-12-16 NOTE — PROGRESS NOTE ADULT - SUBJECTIVE AND OBJECTIVE BOX
HPI: 89M with PMH including dementia, AF, HLD, HTN, and CAD s/p PCI x 2, here from SNF with abnormal labs and lethargy, after recent d/c from [facility] for UTI. Has had waxing and waning mental status during his admission thus far, as well as hypothermia requiring warming blankets; concern exists that his cognitive impairment may not improve. Was also on ertapenem for Proteus infection, now s/p abx. Palliative care called to discuss GOC.     INTERVAL EVENTS:  12/13: patient appears comfortable, doesn't endorse any complaints  12/16: appears comfortable, resting    ADVANCE DIRECTIVES:    DNR [X]  MOLST  [ ]    Living Will  [ ]   DECISION MAKER(s):  [ ] Health Care Proxy(s)  [ ] Surrogate(s)  [ ] Guardian           Name(s) and Contact(s): daughter Wendy Goldberg [phone]    BASELINE (I)ADL(s) (prior to admission):  [place]: [ ]Total  [ ] Moderate [ ]Dependent    Allergies    No Known Allergies    Intolerances    morphine (Unknown)    MEDICATIONS  (STANDING):  amLODIPine   Tablet 10 milliGRAM(s) Oral daily  artificial  tears Solution 1 Drop(s) Both EYES two times a day  dextrose 5% + sodium chloride 0.45%. 1000 milliLiter(s) (50 mL/Hr) IV Continuous <Continuous>  finasteride 5 milliGRAM(s) Oral daily  rivastigmine patch  9.5 mG/24 Hr(s) 1 Patch Transdermal every 24 hours  tamsulosin 0.4 milliGRAM(s) Oral at bedtime    MEDICATIONS  (PRN):  acetaminophen   Tablet .. 650 milliGRAM(s) Oral every 6 hours PRN Moderate Pain (4 - 6)  albuterol/ipratropium for Nebulization 3 milliLiter(s) Nebulizer every 6 hours PRN Shortness of Breath and/or Wheezing  polyethylene glycol 3350 17 Gram(s) Oral at bedtime PRN Constipation    PRESENT SYMPTOMS:   Source if other than patient:  [ ]Family   [ ]Team     Pain (Impact on QOL):    Location -         Minimal acceptable level (0-10 scale):                    Aggravating factors -  Quality -  Radiation -  Severity (0-10 scale) -    Timing -    PAIN AD Score:     [email or web address] (press ctrl +  left click to view)    Dyspnea:                           [ ]Mild [ ]Moderate [ ]Severe  Anxiety:                             [ ]Mild [ ]Moderate [ ]Severe  Fatigue:                             [ ]Mild [ ]Moderate [ ]Severe  Nausea:                             [ ]Mild [ ]Moderate [ ]Severe  Loss of appetite:              [ ]Mild [ ]Moderate [ ]Severe  Constipation:                    [ ]Mild [ ]Moderate [ ]Severe    Other Symptoms:  [ X]All other review of systems negative   [ ]Unable to obtain due to poor mentation     Karnofsky Performance Score/Palliative Performance Status Version 2:         %    PHYSICAL EXAM:  Vital Signs Last 24 Hrs  T(C): 36.7 (16 Dec 2019 12:14), Max: 36.7 (16 Dec 2019 04:28)  T(F): 98 (16 Dec 2019 12:14), Max: 98 (16 Dec 2019 04:28)  HR: 69 (16 Dec 2019 12:14) (59 - 69)  BP: 157/87 (16 Dec 2019 12:14) (113/67 - 157/87)  BP(mean): --  RR: 18 (16 Dec 2019 12:14) (18 - 18)  SpO2: 95% (16 Dec 2019 12:14) (95% - 97%)    Limited exam for patient comfort  GENERAL:  [ ]Alert  [ ]Oriented x   [ ]Lethargic  [ ]Cachexia  [ ]Unarousable  [X ]Verbal  [ ]Non-Verbal    Behavioral:   [ ] Anxiety  [ ] Delirium [ ] Agitation [ ] Other    HEENT:  [ X]Normal   [ ]Dry mouth   [ ]ET Tube/Trach  [ ]Oral lesions    PULMONARY:   [ ]Clear [ ]Tachypnea  [ ]Audible excessive secretions   [ ]Rhonchi        [ ]Right [ ]Left [ ]Bilateral  [ ]Crackles        [ ]Right [ ]Left [ ]Bilateral  [ ]Wheezing     [ ]Right [ ]Left [ ]Bilateral    CARDIOVASCULAR:    [ ]Regular [ ]Irregular [ ]Tachy  [ ]Yosvany [ ]Murmur [ ]Other    GASTROINTESTINAL:  [ ]Soft  [ ]Distended   [ ]+BS  [ ]Non tender [ ]Tender  [ ]PEG [ ]OGT/ NGT  Last BM:     GENITOURINARY:  [ ]Normal [ ] Incontinent   [ ]Oliguria/Anuria   [ ]Mai    MUSCULOSKELETAL:   [ ]Normal   [ ]Weakness  [ ]Bed/Wheelchair bound [ ]Edema    NEUROLOGIC:   [ ]No focal deficits  [ ] Cognitive impairment  [ ] Dysphagia [ ]Dysarthria [ ] Paresis [ ]Other     SKIN:   [ ]Normal   [ ]Pressure ulcer(s)  [ ]Rash    CRITICAL CARE:  [ ] Shock Present  [ ]Septic [ ]Cardiogenic [ ]Neurologic [ ]Hypovolemic  [ ]  Vasopressors [ ]  Inotropes   [ ] Respiratory failure present  [ ] Acute  [ ] Chronic [ ] Hypoxic  [ ] Hypercarbic [ ] Other  [ ] Other organ failure     LABS: no AM labs for review     RADIOLOGY & ADDITIONAL STUDIES:    CXR 12/8/19    Left basilar atelectasis. Otherwise, clear lungs.    PROTEIN CALORIE MALNUTRITION:   [ ] PPSV2 < or = to 30% [ ] significant weight loss  [ ] poor nutritional intake [ ] catabolic state [ ] anasarca     Albumin, Serum: 3.7 g/dL (12-06-19 @ 17:42)  Artificial Nutrition [ ]     REFERRALS:   [ ]Chaplaincy  [ ] Hospice  [ ]Child Life  [ ]Social Work  [ ]Case management [ ]Holistic Therapy     Goals of Care Discussion Document:     ........ ....... ...... ..... .... ... .. .  Sergey Samano MD  Palliative Medicine  office: [phone] | [phone]  pager: [phone]

## 2019-12-16 NOTE — PROGRESS NOTE ADULT - PROBLEM SELECTOR PLAN 2
- per family, wavers between being ambulatory and conversant, with periods (usually weekly) of a "dementia state" (per family) where he is more lethargic and bedbound and withdrawn  - family was told he does not have Alzheimer's, but regardless his dementia has been worsening over time; he has also had frequent hospitalizations

## 2019-12-16 NOTE — PROGRESS NOTE ADULT - ASSESSMENT
89 yo male recently admitted to [facility] for hematuria presents with a change in mental status. Patient found to be very lethargic and unable to ambulate. In ED Patient was found to have a + UA with suggested Urinary tract infection. Patient very lethargic yesterday.  Alert and responsive today. Patient with waxing and waning mental status. Very alert earlier in the past and now  minimally responsive .  Will get ammonia level, abg and EEG to r/o absence seizure  with unarousability. This am not alert and barely arousable.    The patient was hypothermic last night and required warming blankets. 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 He has now completed Ertapenem for a resistant Proteus Infection.  Awake this morning and speaking with his son. He is coherent and asking for food. DNR/DNI now in effect; at family request. will need to discuss placement with family after [facility].

## 2019-12-16 NOTE — PROGRESS NOTE ADULT - ASSESSMENT
89M with PMH including dementia, AF, HLD, HTN, and CAD s/p PCI x 2, here from SNF with abnormal labs and lethargy, after recent d/c from [facility] for UTI. Has had waxing and waning mental status during his admission thus far, as well as hypothermia requiring warming blankets; concern exists that his cognitive impairment may not improve. Was also on ertapenem for Proteus infection, now s/p abx. Palliative care called to discuss GOC.

## 2019-12-16 NOTE — PROGRESS NOTE ADULT - PROBLEM SELECTOR PLAN 2
MS continues to wax and wanes  slight improvement. Patient is responding to question  will continue to monitor mental status

## 2019-12-16 NOTE — PROGRESS NOTE ADULT - PROBLEM SELECTOR PLAN 3
- extensive discussion held with johnie Taqueria and Antonio, palliative SW, and myself  - Tauqeria states he is HCP, and that he brought in paperwork previously; not noted in paper chart or sunrise, Taqueria will provide another copy  - MOLST reviewed; was completed incorrectly, voided and new form completed: DNR, DNI, no feeding tube  - discussed hospice, family is opting for return to [facility] independent living with 24/7 HHA, but are aware of hospice in the future  - would benefit from outpatient palliative care follow-up; please note in d/c instructions that patient can make an appointment with Dr. Gorman, a geriatrics and palliative care specialist, by calling [phone]    >60 minutes spent on above

## 2019-12-17 ENCOUNTER — TRANSCRIPTION ENCOUNTER (OUTPATIENT)
Age: 84
End: 2019-12-17

## 2019-12-17 VITALS
TEMPERATURE: 98 F | SYSTOLIC BLOOD PRESSURE: 113 MMHG | OXYGEN SATURATION: 97 % | RESPIRATION RATE: 18 BRPM | DIASTOLIC BLOOD PRESSURE: 65 MMHG | HEART RATE: 58 BPM

## 2019-12-17 LAB
ANION GAP SERPL CALC-SCNC: 12 MMOL/L — SIGNIFICANT CHANGE UP (ref 5–17)
BUN SERPL-MCNC: 18 MG/DL — SIGNIFICANT CHANGE UP (ref 7–23)
CALCIUM SERPL-MCNC: 8.8 MG/DL — SIGNIFICANT CHANGE UP (ref 8.4–10.5)
CHLORIDE SERPL-SCNC: 106 MMOL/L — SIGNIFICANT CHANGE UP (ref 96–108)
CO2 SERPL-SCNC: 26 MMOL/L — SIGNIFICANT CHANGE UP (ref 22–31)
CREAT SERPL-MCNC: 1.19 MG/DL — SIGNIFICANT CHANGE UP (ref 0.5–1.3)
GLUCOSE SERPL-MCNC: 111 MG/DL — HIGH (ref 70–99)
HCT VFR BLD CALC: 32.3 % — LOW (ref 39–50)
HGB BLD-MCNC: 10.2 G/DL — LOW (ref 13–17)
MCHC RBC-ENTMCNC: 28.7 PG — SIGNIFICANT CHANGE UP (ref 27–34)
MCHC RBC-ENTMCNC: 31.6 GM/DL — LOW (ref 32–36)
MCV RBC AUTO: 90.7 FL — SIGNIFICANT CHANGE UP (ref 80–100)
PLATELET # BLD AUTO: 220 K/UL — SIGNIFICANT CHANGE UP (ref 150–400)
POTASSIUM SERPL-MCNC: 3.9 MMOL/L — SIGNIFICANT CHANGE UP (ref 3.5–5.3)
POTASSIUM SERPL-SCNC: 3.9 MMOL/L — SIGNIFICANT CHANGE UP (ref 3.5–5.3)
RBC # BLD: 3.56 M/UL — LOW (ref 4.2–5.8)
RBC # FLD: 14.6 % — HIGH (ref 10.3–14.5)
SODIUM SERPL-SCNC: 144 MMOL/L — SIGNIFICANT CHANGE UP (ref 135–145)
WBC # BLD: 5.93 K/UL — SIGNIFICANT CHANGE UP (ref 3.8–10.5)
WBC # FLD AUTO: 5.93 K/UL — SIGNIFICANT CHANGE UP (ref 3.8–10.5)

## 2019-12-17 PROCEDURE — 80053 COMPREHEN METABOLIC PANEL: CPT

## 2019-12-17 PROCEDURE — 87086 URINE CULTURE/COLONY COUNT: CPT

## 2019-12-17 PROCEDURE — 82947 ASSAY GLUCOSE BLOOD QUANT: CPT

## 2019-12-17 PROCEDURE — 82962 GLUCOSE BLOOD TEST: CPT

## 2019-12-17 PROCEDURE — 85610 PROTHROMBIN TIME: CPT

## 2019-12-17 PROCEDURE — 94640 AIRWAY INHALATION TREATMENT: CPT

## 2019-12-17 PROCEDURE — 71045 X-RAY EXAM CHEST 1 VIEW: CPT

## 2019-12-17 PROCEDURE — 81001 URINALYSIS AUTO W/SCOPE: CPT

## 2019-12-17 PROCEDURE — 97110 THERAPEUTIC EXERCISES: CPT

## 2019-12-17 PROCEDURE — 85014 HEMATOCRIT: CPT

## 2019-12-17 PROCEDURE — 84295 ASSAY OF SERUM SODIUM: CPT

## 2019-12-17 PROCEDURE — 83605 ASSAY OF LACTIC ACID: CPT

## 2019-12-17 PROCEDURE — 80048 BASIC METABOLIC PNL TOTAL CA: CPT

## 2019-12-17 PROCEDURE — 85730 THROMBOPLASTIN TIME PARTIAL: CPT

## 2019-12-17 PROCEDURE — 70450 CT HEAD/BRAIN W/O DYE: CPT

## 2019-12-17 PROCEDURE — 84132 ASSAY OF SERUM POTASSIUM: CPT

## 2019-12-17 PROCEDURE — 87040 BLOOD CULTURE FOR BACTERIA: CPT

## 2019-12-17 PROCEDURE — 99285 EMERGENCY DEPT VISIT HI MDM: CPT | Mod: 25

## 2019-12-17 PROCEDURE — 96375 TX/PRO/DX INJ NEW DRUG ADDON: CPT | Mod: XU

## 2019-12-17 PROCEDURE — 97161 PT EVAL LOW COMPLEX 20 MIN: CPT

## 2019-12-17 PROCEDURE — 85027 COMPLETE CBC AUTOMATED: CPT

## 2019-12-17 PROCEDURE — 82435 ASSAY OF BLOOD CHLORIDE: CPT

## 2019-12-17 PROCEDURE — 99238 HOSP IP/OBS DSCHRG MGMT 30/<: CPT

## 2019-12-17 PROCEDURE — 51701 INSERT BLADDER CATHETER: CPT | Mod: XU

## 2019-12-17 PROCEDURE — 82330 ASSAY OF CALCIUM: CPT

## 2019-12-17 PROCEDURE — 82803 BLOOD GASES ANY COMBINATION: CPT

## 2019-12-17 PROCEDURE — 96374 THER/PROPH/DIAG INJ IV PUSH: CPT | Mod: XU

## 2019-12-17 RX ORDER — ATORVASTATIN CALCIUM 80 MG/1
1 TABLET, FILM COATED ORAL
Qty: 0 | Refills: 0 | DISCHARGE

## 2019-12-17 RX ADMIN — SODIUM CHLORIDE 50 MILLILITER(S): 9 INJECTION, SOLUTION INTRAVENOUS at 08:10

## 2019-12-17 RX ADMIN — SODIUM CHLORIDE 50 MILLILITER(S): 9 INJECTION, SOLUTION INTRAVENOUS at 06:46

## 2019-12-17 RX ADMIN — AMLODIPINE BESYLATE 10 MILLIGRAM(S): 2.5 TABLET ORAL at 06:44

## 2019-12-17 RX ADMIN — RIVASTIGMINE 1 PATCH: 4.6 PATCH, EXTENDED RELEASE TRANSDERMAL at 06:47

## 2019-12-17 RX ADMIN — RIVASTIGMINE 1 PATCH: 4.6 PATCH, EXTENDED RELEASE TRANSDERMAL at 06:45

## 2019-12-17 RX ADMIN — FINASTERIDE 5 MILLIGRAM(S): 5 TABLET, FILM COATED ORAL at 13:21

## 2019-12-17 RX ADMIN — Medication 1 DROP(S): at 06:44

## 2019-12-17 RX ADMIN — RIVASTIGMINE 1 PATCH: 4.6 PATCH, EXTENDED RELEASE TRANSDERMAL at 08:09

## 2019-12-17 NOTE — PROGRESS NOTE ADULT - ASSESSMENT
89 yo male recently admitted to [facility] for hematuria presents with a change in mental status. Patient found to be very lethargic and unable to ambulate. In ED Patient was found to have a + UA with suggested Urinary tract infection. Patient very lethargic yesterday.  Alert and responsive today. Patient with waxing and waning mental status. Very alert earlier in the past and now  minimally responsive .  Will get ammonia level, abg and EEG to r/o absence seizure  with unarousability. This am not alert and barely arousable.    The patient was hypothermic last night and required warming blankets. 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 He has now completed Ertapenem for a resistant Proteus Infection.  Awake this morning and speaking with his son. He is coherent and asking for food. DNR/DNI now in effect; at family request. will need to discuss placement with family after [facility].

## 2019-12-17 NOTE — DISCHARGE NOTE NURSING/CASE MANAGEMENT/SOCIAL WORK - NSDCFUADDAPPT_GEN_ALL_CORE_FT
Call to schedule follow up appointment with primary care physician upon discharge from rehab  make an appointment with Dr. Gorman, a geriatrics and palliative care specialist, by calling [phone]

## 2019-12-17 NOTE — DISCHARGE NOTE PROVIDER - CARE PROVIDERS DIRECT ADDRESSES
,DirectAddress_Unknown ,DirectAddress_Unknown,[email or web address]

## 2019-12-17 NOTE — DISCHARGE NOTE PROVIDER - NSDCFUADDAPPT_GEN_ALL_CORE_FT
Call to schedule follow up appointment with primary care physician upon discharge from rehab Call to schedule follow up appointment with primary care physician upon discharge from rehab  make an appointment with Dr. Gorman, a geriatrics and palliative care specialist, by calling [phone]

## 2019-12-17 NOTE — PROGRESS NOTE ADULT - SUBJECTIVE AND OBJECTIVE BOX
89 year old male with pmhx AFib, UTI, dementia, hypercholesteremia, HTN, CAD and pshx PCI x2, hernia repair presents to the ED s/p recent discharge from [place] x2 days ago for UTI. Pt was sent in from nursing home for abnormal labs and lethargy. Reported that since yesterday, pt was more lethargic. Today, the pt had labs conducted at the nursing home, with unspecified abnormal lab values. Subsequently referred to [facility] ED, found to be hypoxic by EMS to spO2 90%, placed on 3L NC, responsive to voice. Per nursing home, pt is usually able to have a conversation and is alert. Denies fever. Upon review of lab results brought by EMS, only abnormality on CBC and CMP is a hemoglobin value of 10.9. Patient with a hx of worsening multi infarct dementia. Has had multiple episode of waxing and waning mental status Pt still very lethargic. Slightly more alert this am. He is doing very well tonight  with son present . Family wants to have patine discharged [facility].  Patient is more awake and alert. answering questions appropriately. still confused at times          MEDICATIONS  (STANDING):  amLODIPine   Tablet 10 milliGRAM(s) Oral daily  artificial  tears Solution 1 Drop(s) Both EYES two times a day  dextrose 5% + sodium chloride 0.45%. 1000 milliLiter(s) (50 mL/Hr) IV Continuous <Continuous>  finasteride 5 milliGRAM(s) Oral daily  rivastigmine patch  9.5 mG/24 Hr(s) 1 Patch Transdermal every 24 hours  tamsulosin 0.4 milliGRAM(s) Oral at bedtime    MEDICATIONS  (PRN):  acetaminophen   Tablet .. 650 milliGRAM(s) Oral every 6 hours PRN Moderate Pain (4 - 6)  albuterol/ipratropium for Nebulization 3 milliLiter(s) Nebulizer every 6 hours PRN Shortness of Breath and/or Wheezing  polyethylene glycol 3350 17 Gram(s) Oral at bedtime PRN Constipation        Vital Signs Last 24 Hrs  T(C): 36.3 (17 Dec 2019 03:47), Max: 36.7 (16 Dec 2019 12:14)  T(F): 97.4 (17 Dec 2019 03:47), Max: 98 (16 Dec 2019 12:14)  HR: 57 (17 Dec 2019 03:47) (57 - 70)  BP: 120/67 (17 Dec 2019 03:47) (120/67 - 157/87)  BP(mean): --  RR: 18 (17 Dec 2019 03:47) (18 - 18)  SpO2: 98% (17 Dec 2019 03:47) (95% - 98%)      PHYSICAL EXAM:  	GENERAL: NAD, well nourished  	HEAD:  Atraumatic  	EYES: EOM, PERRLA, conjunctiva pink and sclera white  	ENT: No tonsillar erythema, exudates, or enlargement, moist mucous membranes, good dentition, no lesions  	NECK: Supple, No JVD, normal thyroid, carotids with normal upstrokes and no bruits  	CHEST/LUNG: Clear to auscultation bilaterally, No rales, rhonchi, wheezing, or rubs  	HEART: Regular rate and rhythm, No murmurs, rubs, or gallops  	ABDOMEN: Soft, nondistended, no masses, guarding, tenderness or rebound, bowel sounds present  	EXTREMITIES:  2+ Peripheral Pulses, No clubbing, cyanosis, or edema.   	LYMPH: No lymphadenopathy noted  	SKIN: No rashes or lesions no breakdown.  NERVOUS SYSTEM:  confused minimally responsive    LABS    CBC Full  -  ( 17 Dec 2019 08:46 )  WBC Count : 5.93 K/uL  RBC Count : 3.56 M/uL  Hemoglobin : 10.2 g/dL  Hematocrit : 32.3 %  Platelet Count - Automated : 220 K/uL  Mean Cell Volume : 90.7 fl  Mean Cell Hemoglobin : 28.7 pg  Mean Cell Hemoglobin Concentration : 31.6 gm/dL  Auto Neutrophil # : x  Auto Lymphocyte # : x  Auto Monocyte # : x  Auto Eosinophil # : x  Auto Basophil # : x  Auto Neutrophil % : x  Auto Lymphocyte % : x  Auto Monocyte % : x  Auto Eosinophil % : x  Auto Basophil % : x    12-17    144  |  106  |  18  ----------------------------<  111<H>  3.9   |  26  |  1.19    Ca    8.8      17 Dec 2019 05:15                              CAPILLARY BLOOD GLUCOSE          RADIOLOGY & ADDITIONAL TESTS: 89 year old male with pmhx AFib, UTI, dementia, hypercholesteremia, HTN, CAD and pshx PCI x2, hernia repair presents to the ED s/p recent discharge from [place] x2 days ago for UTI. Pt was sent in from nursing home for abnormal labs and lethargy. Reported that since yesterday, pt was more lethargic. Today, the pt had labs conducted at the nursing home, with unspecified abnormal lab values. Subsequently referred to [facility] ED, found to be hypoxic by EMS to spO2 90%, placed on 3L NC, responsive to voice. Per nursing home, pt is usually able to have a conversation and is alert. Denies fever. Upon review of lab results brought by EMS, only abnormality on CBC and CMP is a hemoglobin value of 10.9. Patient with a hx of worsening multi infarct dementia. Has had multiple episode of waxing and waning mental status Pt still very lethargic. Slightly more alert this am. He is doing very well tonight  with son present . Family wants to have patient [facility].  Patient is more awake and alert. answering questions appropriately. still confused at times. Eating well with no aspiration.          MEDICATIONS  (STANDING):  amLODIPine   Tablet 10 milliGRAM(s) Oral daily  artificial  tears Solution 1 Drop(s) Both EYES two times a day  dextrose 5% + sodium chloride 0.45%. 1000 milliLiter(s) (50 mL/Hr) IV Continuous <Continuous>  finasteride 5 milliGRAM(s) Oral daily  rivastigmine patch  9.5 mG/24 Hr(s) 1 Patch Transdermal every 24 hours  tamsulosin 0.4 milliGRAM(s) Oral at bedtime    MEDICATIONS  (PRN):  acetaminophen   Tablet .. 650 milliGRAM(s) Oral every 6 hours PRN Moderate Pain (4 - 6)  albuterol/ipratropium for Nebulization 3 milliLiter(s) Nebulizer every 6 hours PRN Shortness of Breath and/or Wheezing  polyethylene glycol 3350 17 Gram(s) Oral at bedtime PRN Constipation        Vital Signs Last 24 Hrs  T(C): 36.3 (17 Dec 2019 03:47), Max: 36.7 (16 Dec 2019 12:14)  T(F): 97.4 (17 Dec 2019 03:47), Max: 98 (16 Dec 2019 12:14)  HR: 57 (17 Dec 2019 03:47) (57 - 70)  BP: 120/67 (17 Dec 2019 03:47) (120/67 - 157/87)  BP(mean): --  RR: 18 (17 Dec 2019 03:47) (18 - 18)  SpO2: 98% (17 Dec 2019 03:47) (95% - 98%)      PHYSICAL EXAM:  	GENERAL: NAD, well nourished  	HEAD:  Atraumatic  	EYES: EOM, PERRLA, conjunctiva pink and sclera white  	ENT: No tonsillar erythema, exudates, or enlargement, moist mucous membranes, good dentition, no lesions  	NECK: Supple, No JVD, normal thyroid, carotids with normal upstrokes and no bruits  	CHEST/LUNG: Clear to auscultation bilaterally, No rales, rhonchi, wheezing, or rubs  	HEART: Regular rate and rhythm, No murmurs, rubs, or gallops  	ABDOMEN: Soft, nondistended, no masses, guarding, tenderness or rebound, bowel sounds present  	EXTREMITIES:  2+ Peripheral Pulses, No clubbing, cyanosis, or edema.   	LYMPH: No lymphadenopathy noted  	SKIN: No rashes or lesions no breakdown.  NERVOUS SYSTEM:  confused minimally responsive    LABS    CBC Full  -  ( 17 Dec 2019 08:46 )  WBC Count : 5.93 K/uL  RBC Count : 3.56 M/uL  Hemoglobin : 10.2 g/dL  Hematocrit : 32.3 %  Platelet Count - Automated : 220 K/uL  Mean Cell Volume : 90.7 fl  Mean Cell Hemoglobin : 28.7 pg  Mean Cell Hemoglobin Concentration : 31.6 gm/dL  Auto Neutrophil # : x  Auto Lymphocyte # : x  Auto Monocyte # : x  Auto Eosinophil # : x  Auto Basophil # : x  Auto Neutrophil % : x  Auto Lymphocyte % : x  Auto Monocyte % : x  Auto Eosinophil % : x  Auto Basophil % : x    12-17    144  |  106  |  18  ----------------------------<  111<H>  3.9   |  26  |  1.19    Ca    8.8      17 Dec 2019 05:15                              CAPILLARY BLOOD GLUCOSE          RADIOLOGY & ADDITIONAL TESTS:

## 2019-12-17 NOTE — DISCHARGE NOTE PROVIDER - CARE PROVIDER_API CALL
Jimmy Franco)  Internal Medicine  [address] [place] [address]  Phone: [phone]  Fax: [phone]  Follow Up Time: Jimmy Franco)  Internal Medicine  [address] [id number]  Phone: [phone]  Fax: [phone]  Follow Up Time:     Priscila Gorman)  Medicine Palliative Care  [address], [address]  [place] [address]  Phone: [phone]  Follow Up Time:

## 2019-12-17 NOTE — DISCHARGE NOTE NURSING/CASE MANAGEMENT/SOCIAL WORK - PATIENT PORTAL LINK FT
You can access the FollowMyHealth Patient Portal offered by [facility] by registering at the following website: [email or web address]. By joining [facility]’s FollowMyHealth portal, you will also be able to view your health information using other applications (apps) compatible with our system.

## 2019-12-17 NOTE — DISCHARGE NOTE PROVIDER - NSDCCPCAREPLAN_GEN_ALL_CORE_FT
PRINCIPAL DISCHARGE DIAGNOSIS  Diagnosis: Cough  Assessment and Plan of Treatment:       SECONDARY DISCHARGE DIAGNOSES  Diagnosis: UTI (urinary tract infection)  Assessment and Plan of Treatment: UTI (urinary tract infection)    Diagnosis: Encounter for palliative care  Assessment and Plan of Treatment: Encounter for palliative care PRINCIPAL DISCHARGE DIAGNOSIS  Diagnosis: Cough  Assessment and Plan of Treatment:       SECONDARY DISCHARGE DIAGNOSES  Diagnosis: UTI (urinary tract infection)  Assessment and Plan of Treatment: UTI (urinary tract infection)    Diagnosis: Encounter for palliative care  Assessment and Plan of Treatment: Family meeting held with palliative care  Pt made DNR, DNI, no feeding tube  Would benefit from outpatient palliative care follow-up; please note in d/c instructions that patient can make an appointment with Dr. Gorman, a geriatrics and palliative care specialist, by calling [phone] PRINCIPAL DISCHARGE DIAGNOSIS  Diagnosis: Altered mental status, unspecified altered mental status type  Assessment and Plan of Treatment: Patient with waxing and waning mental status likely secondary to dementia  Mental status with slight improvement  Infectious work up negative for the exception of a UTI  Completed antibiotics      SECONDARY DISCHARGE DIAGNOSES  Diagnosis: Dementia  Assessment and Plan of Treatment: Continue current regimen  Follow up with primary care physician    Diagnosis: UTI (urinary tract infection)  Assessment and Plan of Treatment: Completed antibiotics  Drink enough water and fluids to keep your urine clear or pale yellow.  Avoid caffeine, tea, and carbonated beverages. They tend to irritate your bladder.  Empty your bladder often. Avoid holding urine for long periods of time.  Empty your bladder before and after sexual intercourse.  After a bowel movement, women should cleanse from front to back. Use each tissue only once.  SEEK MEDICAL CARE IF:  You have back pain.  You develop a fever.  Your symptoms do not begin to resolve within 3 days.  SEEK IMMEDIATE MEDICAL CARE IF:  You have severe back pain or lower abdominal pain.  You develop chills.  You have nausea or vomiting.  You have continued burning or discomfort with urination.      Diagnosis: Encounter for palliative care  Assessment and Plan of Treatment: Family meeting held with palliative care  Pt made DNR, DNI, no feeding tube  Would benefit from outpatient palliative care follow-up;  can make an appointment with Dr. Gorman, a geriatrics and palliative care specialist, by calling [phone]

## 2019-12-17 NOTE — DISCHARGE NOTE PROVIDER - PROVIDER TOKENS
PROVIDER:[TOKEN:[[id number]]] PROVIDER:[TOKEN:[[id number]]],PROVIDER:[TOKEN:[[id number]]]

## 2019-12-17 NOTE — DISCHARGE NOTE PROVIDER - HOSPITAL COURSE
89 year old male with pmhx AFib, UTI, dementia, hypercholesteremia, HTN, CAD and pshx PCI x2, hernia repair presents to the ED s/p recent discharge from [place] x2 days ago for UTI. Pt was sent in from nursing home for abnormal labs and lethargy. Reported that since yesterday, pt was more lethargic. Today, the pt had labs conducted at the nursing home, with unspecified abnormal lab values. Subsequently referred to [facility] ED, found to be hypoxic by EMS to spO2 90%, placed on 3L NC, responsive to voice. Per nursing home, pt is usually able to have a conversation and is alert. Denies fever. Upon review of lab results brought by EMS, only abnormality on CBC and CMP is a hemoglobin value of 10.9. Patient with a hx of worsening multi infarct dementia. Has had multiple episode of waxing and waning mental status 89 year old male with pmhx AFib, UTI, dementia, hypercholesteremia, HTN, CAD and pshx PCI x2, hernia repair presents to the ED s/p recent discharge from [place] x2 days ago for UTI. Pt was sent in from nursing home for abnormal labs and lethargy. Reported that since yesterday, pt was more lethargic. Today, the pt had labs conducted at the nursing home, with unspecified abnormal lab values. Subsequently referred to [facility] ED, found to be hypoxic by EMS to spO2 90%, placed on 3L NC, responsive to voice. Per nursing home, pt is usually able to have a conversation and is alert. Denies fever. Upon review of lab results brought by EMS, only abnormality on CBC and CMP is a hemoglobin value of 10.9. Patient with a hx of worsening multi infarct dementia. Has had multiple episode of waxing and waning mental status            89 yo male recently admitted to [facility] for hematuria presents with a change in mental status. Patient found to be very lethargic and unable to ambulate. In ED Patient was found to have a + UA with suggested Urinary tract infection. Patient very lethargic yesterday.  Alert and responsive today. Patient with waxing and waning mental status. Very alert earlier in the past and now  minimally responsive .  Will get ammonia level, abg and EEG to r/o absence seizure  with unarousability. This am not alert and barely arousable.    The patient was hypothermic last night and required warming blankets. 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 He has now completed Ertapenem for a resistant Proteus Infection.  Awake this morning and speaking with his son. He is coherent and asking for food. DNR/DNI now in effect; at family request. will need to discuss placement with family after [facility].                 Problem/Plan - 1:    ·  Problem: UTI (urinary tract infection).  Plan: will continue Flomax and finasteride    no further abx.          Problem/Plan - 2:    ·  Problem: AMS (altered mental status).  Plan: MS continues to wax and wanes    slight improvement. Patient is responding to question    will continue to monitor mental status.          Problem/Plan - 3:    ·  Problem: Dementia.  Plan: will continue exelon patch    reorient Patient as needed.          Problem/Plan - 4:    ·  Problem: HTN (hypertension).  Plan: continue current regimen.         Attending Attestation:     Will resume a dysphagia diet at patient and family request. monitor mental status. 89 yo male w/ pmhx  of AFib, UTI, dementia, hypercholesteremia, HTN, CAD, PCI x2, and hernia repair presents to the ED s/p recent discharge from [place] x2 days prior for UTI. Pt was sent in from nursing home for abnormal labs and lethargy. Patient found to be very lethargic and unable to ambulate. In ED Patient was found to have a + UA with suggested Urinary tract infection. Patient very lethargic yesterday.  Alert and responsive today. Patient with waxing and waning mental status. Very alert earlier in the past and now minimally responsive. CXR, BCx and UCx with NGTD, CXR negative for PNA. Patient had episode of hypothermia requiring warming blankets. 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 He has now completed Ertapenem for a resistant Proteus Infection. Mental status with slight improvement today. Palliative care meeting held with family, pt  DNR/DNI and at  family request. will need to discuss placement with family after rehab.

## 2019-12-17 NOTE — DISCHARGE NOTE PROVIDER - NSDCMRMEDTOKEN_GEN_ALL_CORE_FT
acetaminophen 160 mg/5 mL oral suspension: 20.31 milliliter(s) orally every 4 hours, As needed, Mild Pain (1 - 3)  amLODIPine 10 mg oral tablet: 1 tab(s) orally once a day  atorvastatin 10 mg oral tablet: 1 tab(s) orally once a day  Calcium 600+D 600 mg-200 units oral tablet: 1 tab(s) orally once a day  docusate sodium 100 mg oral capsule: 1 cap(s) orally 3 times a day  ertapenem 1 g injection: 1 gram(s) intravenous once a day until 12/7/19.  Exelon 9.5 mg/24 hr transdermal film, extended release: 1 patch transdermal once a day  Flomax 0.4 mg oral capsule: 1 cap(s) orally once a day (at bedtime)  multivitamin with iron: 1 tab(s) orally once a day  polyethylene glycol 3350 oral powder for reconstitution: 17 gram(s) orally once a day (at bedtime), As needed, Constipation  Proscar 5 mg oral tablet: 1 tab(s) orally once a day  Saw Palmetto: 450 milligram(s) orally 2 times a day  senna oral tablet: 2 tab(s) orally once a day (at bedtime) acetaminophen 160 mg/5 mL oral suspension: 20.31 milliliter(s) orally every 4 hours, As needed, Mild Pain (1 - 3)  amLODIPine 10 mg oral tablet: 1 tab(s) orally once a day  Calcium 600+D 600 mg-200 units oral tablet: 1 tab(s) orally once a day  docusate sodium 100 mg oral capsule: 1 cap(s) orally 3 times a day  Exelon 9.5 mg/24 hr transdermal film, extended release: 1 patch transdermal once a day  Flomax 0.4 mg oral capsule: 1 cap(s) orally once a day (at bedtime)  multivitamin with iron: 1 tab(s) orally once a day  polyethylene glycol 3350 oral powder for reconstitution: 17 gram(s) orally once a day (at bedtime), As needed, Constipation  Proscar 5 mg oral tablet: 1 tab(s) orally once a day  Saw Palmetto: 450 milligram(s) orally 2 times a day  senna oral tablet: 2 tab(s) orally once a day (at bedtime)

## 2019-12-17 NOTE — PROGRESS NOTE ADULT - ATTENDING COMMENTS
Will resume a dysphagia diet at patient and family request. monitor mental status Will resume a dysphagia diet at patient and family request. Monitor mental status. Discharge to rehab pending bed availability.

## 2020-08-13 NOTE — PATIENT PROFILE ADULT - NSPROGENANESREACTION_GEN_A_NUR
[facility]  Post-Partum Caesarean Section Progress Note    Kaleb Edge Patient Status:  Inpatient    1984 MRN [id number]   [facility] 1SW-J Attending Riky Hester MD   Hosp Day # 9 PCP Pola Tellez MD     SUBJECTIVE:    Postp never had anesthesia

## 2020-10-26 NOTE — PROGRESS NOTE ADULT - PROBLEM SELECTOR PLAN 3
DATE: 10/26/2020  PATIENT: April Mehta  Referred By: Parish Allen MD    Diagnosis: Malignant melanoma of left shoulder (CMS/HCC)  (primary encounter diagnosis)  Anemia, unspecified type    Stage:  Cancer Staging  Malignant melanoma of left shoulder (CMS/HCC)  Staging form: Melanoma of the Skin, AJCC 8th Edition  - Pathologic stage from 11/25/2019: Stage IIIC (pT4b, pN1a, cM0) - Signed by Papa Esparza MD on 9/28/2020    Melanoma, left posterior shoulder, > 7 mm, ulcerated (Sept 2019) and positive sentinel lymph node --> Stage 3C  Adjuvant nivolumab (Nov 2019)  Left anterior axillary seroma (1.3 cm)  Irritable bowel      CHIEF COMPLAINT:  April Mehta is here in follow up for the management of melanoma.    HISTORY OF PRESENT ILLNESS:  The patient is a 73 year old  year old  female  with a history of Malignant melanoma of left shoulder (CMS/HCC)  (primary encounter diagnosis)  Anemia, unspecified type presenting with melanoma of the left posterior shoulder. Currently working part time as .  [id number]: Left shoulder lesion noted and increased in size with bleeding.  September 25, 2019: Shave biopsy of left posterior shoulder lesion by Dr.James Cordero reveals >2.4 mm, ulcerated melanoma (deep and peripheral margins involved).  October 8, 2019:  Ms. Mehta presents for recommendations regarding further treatment and work-up.  November 11, 2019: Ms. Mehta returns with daughter Erika for review of pathology from wide excision and sentinel lymph node biopsy.  7 mm deep ulcerated melanoma with positive sentinel lymph node biopsy.  November 18, 2019: April returns for review of treatment and formal OCM treatment planning.  December 23, 2019: April returns with her ex- (from [place]) for dose #2 of adjuvant nivolumab.  January 20, 2020: April returns for dose #3 of adjuvant nivolumab.  No immune related toxicities identified.  Laboratories normal.  February 17, 2020: April returns for dose  #4 of adjuvant nivolumab.  No immune related toxicities identified and laboratories remain within normal limits (MCV remains elevated with stable hemoglobin @ 8.9)  March 16, 2020:  April returns for for dose #5 of adjuvant nivolumab.    May 11, 2020: Patient returns for cycle #6 adjuvant nivolumab, delayed by 1 month due to the coronavirus pandemic  June 8, 2020: Patient returns for cycle #7 adjuvant nivolumab for more than 7 mm deep ulcerated melanoma of the left posterior shoulder stage IIIc.  CT scan chest, abdomen pelvis May 27, 2020 in complete remission.  July 6, 2020: April returns for dose #8 of adjuvant nivolumab for stage IIIc left shoulder melanoma.  August 3, 2020: April returns for dose #9 of adjuvant nivolumab for a stage IIIc left shoulder melanoma.  August 31, 2020: April returns for dose #10 of adjuvant nivolumab for stage IIIc melanoma of the left shoulder.  Creatinine increasing (1.54).  September 28, 2020:   April returns for dose #11 of adjuvant nivolumab for melanoma of the left shoulder. Creatinine decreased to 1.15. No ImAE's.  October 26, 2020:  April returns for dose #12 of adjuvant nivolumab for Stage 3B melanoma of the left shoulder. Hgb decreased to 6.8 and creatinine increased to 1.31. She had a deep cut on her left shin 4 weeks ago and had a lot of bleeding.  Hold treatment.    Patient's medications, allergies, past medical, surgical, social and family histories were reviewed and updated as appropriate.    REVIEW OF SYSTEMS:    Psychosocial assessment was obtained. Intervention was not necessary.    Lab Services on 10/23/2020   Component Date Value Ref Range Status   • WBC 10/23/2020 5.6  4.2 - 11.0 K/mcL Final   • RBC 10/23/2020 2.05* 4.00 - 5.20 mil/mcL Final   • HGB 10/23/2020 6.8* 12.0 - 15.5 g/dL Final    RECHECKED   • HCT 10/23/2020 22.3* 36.0 - 46.5 % Final   • MCV 10/23/2020 108.8* 78.0 - 100.0 fl Final   • MCH 10/23/2020 33.2  26.0 - 34.0 pg Final   •  MCHC 10/23/2020 30.5* 32.0 - 36.5 g/dL Final   • RDW-CV 10/23/2020 14.4  11.0 - 15.0 % Final   • PLT 10/23/2020 383  140 - 450 K/mcL Final   • NRBC 10/23/2020 0  0 /100 WBC Final   • DIFF TYPE 10/23/2020 AUTOMATED DIFFERENTIAL   Final   • Neutrophil 10/23/2020 67  % Final   • LYMPH 10/23/2020 15  % Final   • MONO 10/23/2020 13  % Final   • EOSIN 10/23/2020 3  % Final   • BASO 10/23/2020 1  % Final   • Percent Immature Granuloctyes 10/23/2020 1  % Final   • Absolute Neutrophil 10/23/2020 3.8  1.8 - 7.7 K/mcL Final   • Absolute Lymph 10/23/2020 0.9* 1.0 - 4.0 K/mcL Final   • Absolute Mono 10/23/2020 0.7  0.3 - 0.9 K/mcL Final   • Absolute Eos 10/23/2020 0.2  0.1 - 0.5 K/mcL Final   • Absolute Baso 10/23/2020 0.1  0.0 - 0.3 K/mcL Final   • Absolute Immature Granulocytes 10/23/2020 0.0  0 - 0.2 K/mcl Final   • Fasting Status 10/23/2020 NA  hrs Final   • Sodium 10/23/2020 141  135 - 145 mmol/L Final   • Potassium 10/23/2020 5.1  3.4 - 5.1 mmol/L Final   • Chloride 10/23/2020 114* 98 - 107 mmol/L Final   • Carbon Dioxide 10/23/2020 23  21 - 32 mmol/L Final   • Anion Gap 10/23/2020 9* 10 - 20 mmol/L Final   • Glucose 10/23/2020 85  65 - 99 mg/dL Final   • BUN 10/23/2020 43* 6 - 20 mg/dL Final   • Creatinine 10/23/2020 1.31* 0.51 - 0.95 mg/dL Final   • GFR Estimate,  10/23/2020 47   Final    eGFR 30-59 mL/min/1.73m2 = Moderate decrease in kidney function. Stage 3 CKD (chronic kidney disease) or moderate kidney disease.   • GFR Estimate, Non  10/23/2020 40   Final    eGFR 30-59 mL/min/1.73m2 = Moderate decrease in kidney function. Stage 3 CKD (chronic kidney disease) or moderate kidney disease.   • BUN/Creatinine Ratio 10/23/2020 33* 7 - 25 Final   • CALCIUM 10/23/2020 8.6  8.4 - 10.2 mg/dL Final   • TOTAL BILIRUBIN 10/23/2020 0.1* 0.2 - 1.0 mg/dL Final   • AST/SGOT 10/23/2020 21  <38 Units/L Final   • ALT/SGPT 10/23/2020 23  <64 Units/L Final   • ALK PHOSPHATASE 10/23/2020 60  45 - 117  Units/L Final   • TOTAL PROTEIN 10/23/2020 6.2* 6.4 - 8.2 g/dL Final   • Albumin 10/23/2020 3.2* 3.6 - 5.1 g/dL Final   • GLOBULIN 10/23/2020 3.0  2.0 - 4.0 g/dL Final   • A/G Ratio, Serum 10/23/2020 1.1  1.0 - 2.4 Final   • TSH 10/23/2020 1.852  0.350 - 5.000 mcUnits/mL Final   • T4, FREE 10/23/2020 1.0  0.8 - 1.5 ng/dL Final   • LDH 10/23/2020 223  82 - 240 Units/L Final       ECOG Performance Status:   Oncology Encounter Vitals [10/26/20 0907]   ONC OP Encounter Vitals Group      BP (!) 162/73      Heart Rate 73      Resp 16      Temp 98.3 °F (36.8 °C)      Temp src Temporal      SpO2       Weight 120 lb 7.7 oz (54.6 kg)      Height       Pain Score  0      Pain Location       Pain Education?       BSA (Calculated - m2) - [employer]       BMI (Calculated)         Pain Scale: 0 of 10    Treatment Related Toxicites: Fatigue    A ten point review of systems was reviewed and otherwise negative unless stated above.     Oncology Encounter Vitals [10/26/20 0907]   ONC OP Encounter Vitals Group      BP (!) 162/73      Heart Rate 73      Resp 16      Temp 98.3 °F (36.8 °C)      Temp src Temporal      SpO2       Weight 120 lb 7.7 oz (54.6 kg)      Height       Pain Score  0      Pain Location       Pain Education?       BSA (Calculated - m2) - [employer]       BMI (Calculated)        ECOG Performance Status   ECOG [10/26/20 0928]   ECOG Performance Status 0         Physical Exam:  CON: alert, awake, appears stated age and well nourished  HEENT: normocephalic, EOMI, no icterus, MMM  HEME/LYMPHATIC: no supraclavicular, cervical, or axillary adenopathy  LUNGS: CTAB, no increase in respiratory effort  HEART: RRR, no m/r/g  ABD: +BS, soft, non-distended, no hepatosplenomegaly  BACK: no tenderness to spine  EXTREMITIES: no deformities, no edema  SKIN: no rashes or lesions suggestive of malignancy.  Left leg dressing in place.    PSYCH: cooperative, normal judgment, affect congruent with mood    Physical  Exam      RADIOLOGY:  No orders to display         ASSESSMENT/PLAN:  The patient is a 73 year old year old female with a history of No diagnosis found. presenting with :    Melanoma, left posterior shoulder, >2.4 mm, ulcerated (Sept 2019)  [id number] Stage 3C. Patient has a posterior left shoulder nodular melanoma which clearly has a depth that will exceed 4 mm and is ulcerated.  As a result the patient will need a full metastatic work-up regardless of the results of her sentinel lymph node biopsy.    Patient had [facility] for initial extent of disease evaluation shows no evidence of measurable disease.  Restaging CT scan chest, abdomen pelvis on May 27, 2020 then after 6 cycles of adjuvant nivolumab shows her in maintained remission.  stage IIIc disease and will proceed with therapy in the adjuvant setting, cycle #8 today.  No immune related toxicities.    Left axillary seroma.  Continue to monitor and consider ultrasound and/or removal to be certain there is no retained melanoma.    Macrocytic anemia.  Consider BMBx after completing adjuvant therapy.  Macrocytic anemia with now developing mild leukopenia, macrocytosis despite normal B12 and folate.  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  CT CAP or PET scan after dose #13  Mild itching, no rash.  Triamcinolone prescribed as needed  Continue to monitor left axillary nodule  1 week trial of daily 5-6 G psyllium        ORDERS:  Orders Placed This Encounter   • CBC with Automated Differential   • Lactate Dehydrogenase   • Haptoglobin   • Reticulocyte Count Automated       FOLLOW UP:  Return in about 1 week (around 11/2/2020).    Above plan was discussed with patient and family and all pertinent questions  were answered. At the end of the evaluation, the patient was asked if all complaints had been addressed today to her satisfaction and she responded affirmatively.      Papa Esparza MD, PhD   will continue exelon patch  reorient Patient as needed  MS slightly better today

## 2020-11-16 NOTE — PROGRESS NOTE ADULT - PROBLEM SELECTOR PLAN 2
F/U SCHEDULED FOR 12/2020   continue to improve in a staccato pattern -- more lethargic today ABG and DT are unremarkable  Check ammonia level and lfts    will need physical therapy   Patient with a metabolic encephalopathy due to his UTI

## 2020-12-15 NOTE — PROVIDER CONTACT NOTE (OTHER) - SITUATION
Patient with two second pause on monitor No - the patient is unable to be screened due to medical condition

## 2021-03-04 NOTE — CHART NOTE - NSCHARTNOTESELECT_GEN_ALL_CORE
Pharmacy requesting refill: Lantus insulin  Last OV: 10/6/20  Next OV: none  Last refill: 5/26/20  Last labs: 8/19/20  Refilled.    Event Note

## 2021-04-17 NOTE — PHYSICAL THERAPY INITIAL EVALUATION ADULT - PATIENT PROFILE REVIEW, REHAB EVAL
Department of Anesthesiology  Postprocedure Note    Patient: Ray Arrieta  MRN: [id number]  YOB: 1965  Date of evaluation: 4/17/2021  Time:  6:30 AM     Procedure Summary     Date: 04/15/21 Room / Location: [place] [id number] / [facility]    Anesthesia Start: 9234 Anesthesia Stop: 6958    Procedures:       COLONOSCOPY WITH BIOPSY (N/A )      EGD ESOPHAGOGASTRODUODENOSCOPY DILATATION (N/A ) Diagnosis: (SCREENING DYSPHAGIA)    Surgeons: Bora Murillo DO Responsible Provider: Elinda Crigler, MD    Anesthesia Type: MAC ASA Status: 3          Anesthesia Type: MAC    Naun Phase I: Naun Score: 10    Naun Phase II: Naun Score: 10    Last vitals: Reviewed and per EMR flowsheets.        Anesthesia Post Evaluation    Patient location during evaluation: PACU  Patient participation: complete - patient participated  Level of consciousness: awake and alert  Airway patency: patent  Nausea & Vomiting: no nausea and no vomiting  Complications: no  Cardiovascular status: hemodynamically stable  Respiratory status: acceptable  Hydration status: euvolemic yes

## 2021-06-15 NOTE — PATIENT PROFILE ADULT - HAVE YOU EXPERIENCED A TRAUMATIC EVENT?
[facility]  New patient consultation        CC-  Chief Complaint   Patient presents with     Consult     back and lt hip pain     Serene MEDLEY Danuta 63 y.o. is here today, sent to me by  to discuss the patients pain.  Associated symptoms with pain include low back pain as well as leg pain, she has had multiple surgeries in her low back and shoulder. She is an active person and had to retire early.      Pain score 1/10 in her low back and down the back of her legs as well as down the side , mostly on the left side. Intermittently on the right side.    What does your pain like feel during a flare? shooting  Does the pain interfere with:  Work ----- NA  Walking ------ yes  Sleep ------- yes  Daily activities ------yes  Relationships -------yes  F=7      HPI  Past Medical History:   Diagnosis Date     Anxiety      Arthritis      Chronic kidney disease      Depression      Disease of thyroid gland      GERD (gastroesophageal reflux disease)      Hyperlipidemia      Hypertension      Neuromuscular disorder (H)      Osteoporosis      Past Surgical History:   Procedure Laterality Date     BREAST SURGERY       carpal tunnel repair       CYST REMOVAL Right     rt wrist     EYE SURGERY       FRACTURE SURGERY       PILONIDAL CYST / SINUS EXCISION       SPINE SURGERY       Family History   Problem Relation Age of Onset     Hypertension Mother      Kidney disease Mother      Stroke Mother      COPD Father      Multiple sclerosis Father      Hypertension Sister      Kidney disease Sister      Hypertension Maternal Grandmother      Kidney disease Maternal Grandmother      Stroke Maternal Grandmother      Hyperlipidemia Paternal Grandmother      Stroke Paternal Grandmother      Heart attack Paternal Grandfather      Social History     Tobacco Use   Smoking Status Current Every Day Smoker     Packs/day: 0.50     Years: 14.00     Pack years: 7.00     Types: Cigarettes   Smokeless Tobacco Never Used     Social  History     Substance and Sexual Activity   Alcohol Use None     Social History     Substance and Sexual Activity   Drug Use Not on file     Social History     Substance and Sexual Activity   Sexual Activity Not on file       Chemical Dependency History: Patient Endorses tobacco use, once in a blue year use alcohol use, Denies illicit substance use including THC.  Denies any chemical dependency evaluation or treatment in the past.  Denies any legal issues related to substance use.    Psychiatric History:  Patient reports no current psychiatrist or psychologist.  Denies any suicidal ideation.  Denies any hospitalizations for mental illness. Endorses Depression and anxiety and [place]- [place] [facility]     low risk     Pertinent Pain Medications:  She currently takes Hydrocodone 5/325 mg up to 8 per day    Historically she also used Darvocet.     Previously tried medications:    NSAIDs: none- CKD     Acetaminophen: in the norco     Antidepressants: elavil, clonidine, lorazepam     Gabapentinoids: none at this time     Topicals: none at this time previously tried lyrica and gabapentin.     Supplements: B vitmains    Muscle Relaxants: previously tried cyclobenzaprine, tried lidocaine patches which didn't help either.       Current Outpatient Medications:      amitriptyline (ELAVIL) 100 MG tablet, TAKE ONE AND ONE HALF (1.5) TABLETS BY MOUTH DAILY, Disp: , Rfl:      amLODIPine (NORVASC) 5 MG tablet, Take 5 mg by mouth daily., Disp: , Rfl:      calcium, as carbonate, (TUMS) 200 mg calcium (500 mg) chewable tablet, 1000 mg with meals and 500 mg with snacks po., Disp: , Rfl:      cholecalciferol, vitamin D3, 25 mcg (1,000 unit) capsule, Take 1 capsule by mouth., Disp: , Rfl:      cloNIDine HCL (CATAPRES) 0.1 MG tablet, TAKE 2 TABLETS BY MOUTH DAILY AT BEDTIME. 1 HR PRIOR TO BEDTIME FOR SLEEP., Disp: , Rfl:      furosemide (LASIX) 20 MG tablet, TAKE 1 2 TABS BY MOUTH TWICE DAILY TO CONTROL LEG SWELLING.,  Disp: , Rfl:      HYDROcodone-acetaminophen 5-325 mg per tablet, TAKE 2 TABLETS BY MOUTH 4 TIMES A DAY, Disp: , Rfl:      hydrOXYzine HCL (ATARAX) 25 MG tablet, , Disp: , Rfl:      labetalol (TRANDATE; NORMODYNE) 100 MG tablet, Take 1 tablet (100 mg total) by mouth 2 (two) times a day., Disp: 10 tablet, Rfl: 0     lisinopriL (PRINIVIL,ZESTRIL) 40 MG tablet, 1 tab(s), Disp: , Rfl:      LORazepam (ATIVAN) 0.5 MG tablet, TAKE 1 TABLET BY MOUTH TWICE DAILY AS NEEDED FOR BREAKTHROUGH ANXIETY, Disp: , Rfl:      predniSONE (DELTASONE) 20 MG tablet, , Disp: , Rfl:      rosuvastatin (CRESTOR) 10 MG tablet, Take 10 mg by mouth daily., Disp: , Rfl:      sertraline (ZOLOFT) 100 MG tablet, Take 200 mg by mouth daily., Disp: , Rfl:      vitamin E acid succinate (VITAMIN E SUCCINATE) 400 unit Tab, Take 800 mg by mouth., Disp: , Rfl:     Therapeutic interventions previously tried-   H wave therapy none  Tens Unit- made it worse  Yoga- helped at the time  Piliates- has not tried   PT- tons- she reports that they helped for the most part  Pool therapy- tried but wasn't helpful    Myofascial release- none   Sacroiliac therapy- none at this time    Acupuncture or dry needling- yes was not helpful    Massage- yes but unsure that it helped the pain, made her relax though    Review of Systems:  12 point systems were reviewed with pt as documented on pt health form of 2/16/2021. ROS was positive for low back pain and leg pain the rest of the systems were pertinent negative.    Exam  Vitals:    02/16/21 1102   BP: 137/86   Patient Site: Right Arm   Patient Position: Sitting   Cuff Size: Adult Regular   Pulse: 98     Constitutional-General:  Pleasant, straightforward, healthy appearing individual.   Psych-Mental Status: A & O in no acute distress. Speech is fluent.  Recent and remote memory are intact.  Attention span and concentration are normal. Displays appropriate mood and affect.   H,E,N,T- Symmetrical, Eyes- Perrla, Nares- patents  bilaterally, throat- trachea is midline, airway is patent, unlabored respiratory effort.  Lymph-cervical lymph system (anterior and posterior) without palpable nodules or tenderness throughout. Supraclavicular chain without palpable nodules or tenderness throughout.   Cardiovascular- Regular S1, S2 rhythm without murmurs, gallops, clicks or rubs. legs and feet are warm.  Pulses are palpable and grade 2 at the posterior tibial arteries bilaterally, no edema noted.  Pulmonary- lungs are clear to auscultation throughout   Musckuloskeletal  Gait:  Gait is normal.   Gross Motor: Toe walking, heel walking not tested - patient declines some of the balance test due to discomfort  Strength:  Bilateral upper and lower extremity strength is normal and symmetric 5/5. No atrophy or tone abnormalities noted.   Sensation:  No loss of sensation noted to light touch in both upper and lower extremities. No dermatomal abnormal distributions noted.  Spine ROM:  abNormal range of motion with pain reproduction in the cervical, thoracic and lumbar spine. Per patient report- declined testing  Provocative Maneuvers:  Upper extremity, Hip, sacroiliac, and knee provocative maneuvers are negative.  Palpation:  Inspection and palpatory examination of the spine and upper/lower extremities is unremarkable. Tenderness is noted in the thoracic spine and lumbar spine with palpation as well as right sacrum with palpation- mylagia noted.   Neuro:  Bilateral upper and lower extremity coordination and muscle stretch reflexes are physiologic and symmetric 2/4.  Babinski responses are downgoing.  No clonus bilaterally. Negative hoffmans sign bilaterally.   Integumentary: no rashes or breaks in the skin, no open wounds. Ecchymosis noted with the left upper extremity.     Lab:  Last UDS on 2/16/2021 was taken today and is pending.      Imaging:  No new imaging available to review    :  Dated 2/16/2021 was reviewed with the patient      Assessment  -  Todays diagnosis includes   1. Chronic pain syndrome    2. Midline thoracic back pain, unspecified chronicity    3. Lumbar radiculopathy        After reviewing the patients chart and physical findings I agree that the patient would benefit from what the pain center has to offer. I have discussed with the  patient today the diagnosis and treatment recommendations.  We reviewed the natural progression of this problem at great length.  Some possible benefits and detriments of physical therapy, chiropractic care, medication management, behavorial therapy, anti-inflammatory diet and other alternative treatments were discussed.  We also discussed future possible treatment options in a stepwise fashion, including interventional pain procedures and injections and possible surgical referral if needed.        Patient presents today to establish cares at the pain center. Referred by her PCP for assistance in pain management- alternative cares due to her kidney function. Patient reports that she felt that her provider was comfortable with her current dosing and as a matter of fact recently increased it due to the pain related to her dialysis pain and night time pain. She is prescribed 8 per day and reports that she is taking up to 5 at night due to pain. Perhaps 3 during the day. She notes that she may have tolerance as she has been on this for awhile. Patient is also however taking lorazepam and reports that she never takes it with her opioids. However she is on dialysis and likely does have it in her system until she is dialyzed. Discussed that is is likely in her system regardless. Also the intermittent drinking puts her at an even higher risk for unintentional overdose. Education provided. She would probably due well with medical cannabis as well as buprenorphine for better pain control that does not depend on kidney excretion.     Due to her ongoing low back pain and radicular pain in her thoracic and lumbar spine will  order updated MRI and xrays as well as postural restoration for her thoracic spine pain and her lumbar pain,  Pending imaging will determine injections. Trigger points would likely be helpful for the sacral pain on the right as it seems muscular in nature- recommend that she have them done here at the pain center with Dr. White.     Patient is also trying to get on the donor list for a kidney. She notes that she will stop smoking to accommodate this.     Patient set goals today in the office to achieve with the pain centers help. They are:    1-To walk at least 1 mile and climb stairs     Plan Interventions recommended today:  Assessment tool-        Follow up in 3-4 weeks, we will discuss a pain treatment plan at that time, which may include narcotics and alternative therapies. OVL at the next visit.       Continue your current regimen of alleviating factors- recommend that Dr. Lowry continue cares for opioid management or consider transitioning to alternatives such as medical cannabis, buprenorphine or dilaudid due to kidney function. Patient reports that her PCP is comfortable prescribing.       Please see your current provider for any continued prescription, they may choose to provide you prescriptions of your narcotics until we have your urine screen back. They will continue to manage your general health and have requested you see the pain center for pain management. Please discuss any health concerns with your PCP       [place] Precautions are taken with every patient which includes a  report and drug screen. A UA will be taken today for baseline screening.       Behavioral Therapy- continue cares as you are.      Physical and Occupational Therapy- Referral to PT for kinetic pt for postural restoration- noted torsion in the thoracic spine or lateral bend.       Imaging- xray of the lumbar spine and MRI due to the ongoing pain in your low back and radicular symptoms.  Please go to radiology to get-  they should call you for appointment, will review at the next appointment for possible injections.       Acupuncture- this is an option if you want to try and is available here at the pain center      Schedule trigger points for the right sacrum as needed with Dr. White here at the pain center       Discussed SCS- patient declines       MTM visit with the pharmacist      Non-opoid medical management includes-     Some folks want to use CBD oil for pain control- it is made from the Hemp plant, that is ok, however it is difficult to find a reputable source, currently [employer] is a good resource. If you are employed check with your employer prior to starting.     Education was provided to the patient today in regards to their specific diagnosis.    As a reminder- chronic pain is generally stable, if you experience a new injury or new different pain it is expected that you present to the ED or urgent care for evaluation. DO NOT use your chronic pain medications to treat any new or different pain other then what it is intended for. We do not replace any lost or stolen prescriptions or provide early refills for overtaking your medications.         Orders placed today   Orders Placed This Encounter   Procedures     MR Lumbar Spine Without Contrast     Standing Status:   Future     Standing Expiration Date:   2/16/2022     Order Specific Question:   Can the procedure be changed per Radiologist protocol?     Answer:   Yes     Order Specific Question:   If this is a diagnostic procedure, have the patient's age and recent imaging history been considered?     Answer:   Yes     Order Specific Question:   Is the patient claustrophobic and in need of sedation to complete their MR scan?     Answer:   No     XR Lumbar Spine 4 or More VWS     AP, Lateral, Flexion, Extension views     Standing Status:   Future     Standing Expiration Date:   2/16/2022     Order Specific Question:   Reason for Exam (Describe  Symptoms):     Answer:   ongoing low back pain     Order Specific Question:   Can the procedure be changed per Radiologist protocol?     Answer:   Yes     Pain Management Drug Panel, Urine     Ambulatory referral to Physical Therapy     Referral Priority:   Routine     Referral Type:   Physical Therapy     Referral Reason:   Evaluation and Treatment     Requested Specialty:   Physical Therapy     Number of Visits Requested:   1       Patient reminders:   Diagnostics: UDT/SWAB collected 2/16/2021 and results are pending.  UDT/SWAB:  Patient required a random Urine Drug Testing, due to the need to comply with [facility] Model Policy Guidelines and CDC Guideline for the use of any controlled substances. This is to ensure that patient is compliant with treatment, and monitor for risks such as diversion, abuse, or any other aberrant behaviors. Patient is either being considered for or taking a controlled substance. Unexpected findings will be discussed and treatment decision may be adjusted. Testing is being implemented across the board randomly w/o bias related to age, race, gender, socioeconomic status or [employer] affiliation.     SAFETY REMINDERS  No alcohol while taking controlled substances. Alcohol is not an illegal substance, it is unsafe to use in combination. It is a build up of substances in the body that can be extremely hazardous and may cause respirations to slow to a dangerous rate resulting in hospitalization, brain damage, or death.    Opioid medications have been associated with sharp rise in unintentional overdose and death.  Overdose is a condition characterized by the consumption in excess of a particular drug causing adverse effects. This can happen b/c you are sick, accidentally or intentionally took an extra dose, are on multiple medication that can interact. Someone took your medication and they are not use to the medication.  Symptoms of overdose include:   !breathing slow and shallow, erratic  or not at all  !pinpoint pupils, hallucinations  !confusion  !muscle jerks, slack muscles   !extreme sleepiness or loss of alertness   !awake but not able to talk   !face pale or clammy, vomiting, for lighter skinned people, the skin tone turns bluish purple, for darker skinned people, it turns grayish or ashen   If in a situation where overdose is a concern engage the emergency response system (dial 911).    In one study it was noted that 80% of unintentional overdoses occurred in people who were taking a combination of opioids and benzodiazepines.    Do not sell, loan, borrow or share your opioid medication with anyone. Deaths have occurred as a result of this practice. It is illegal and patients are being prosecuted.     Prevent unexpected access/loss of medication: Keep medication locked. Only carry what you need with you.    The patient agrees to the plan and has no further questions, if questions arise the patient knows to call [phone].     11:12 AM        Thank you for this consult and opportunity to assist with this patients care.    Janie Ulrich, [facility]  [address]  [place] [address]  Ph: [phone]  Fax: [phone]                             no

## 2021-10-06 NOTE — PATIENT PROFILE ADULT - BRADEN SCORE
Medical Necessity Information: It is in your best interest to select a reason for this procedure from the list below. All of these items fulfill various CMS LCD requirements except the new and changing color options.
Number Of Freeze-Thaw Cycles: 2 freeze-thaw cycles
Show Topical Anesthesia Variable?: Yes
Render Note In Bullet Format When Appropriate: No
Detail Level: Detailed
Detail Level: Simple
Duration Of Freeze Thaw-Cycle (Seconds): 6
16

## 2021-10-06 NOTE — DIETITIAN INITIAL EVALUATION ADULT. - NUTRITION CONSULT
no Nasalis-Muscle-Based Myocutaneous Island Pedicle Flap Text: Using a #15 blade, an incision was made around the donor flap to the level of the nasalis muscle. Wide lateral undermining was then performed in both the subcutaneous plane above the nasalis muscle, and in a submuscular plane just above periosteum. This allowed the formation of a free nasalis muscle axial pedicle (based on the angular artery) which was still attached to the actual cutaneous flap, increasing its mobility and vascular viability. Hemostasis was obtained with pinpoint electrocoagulation. The flap was mobilized into position and the pivotal anchor points positioned and stabilized with buried interrupted sutures. Subcutaneous and dermal tissues were closed in a multilayered fashion with sutures. Tissue redundancies were excised, and the epidermal edges were apposed without significant tension and sutured with sutures.

## 2021-10-11 NOTE — ED ADULT NURSE NOTE - INCIDENT LOCATION
home Cyclophosphamide Pregnancy And Lactation Text: This medication is Pregnancy Category D and it isn't considered safe during pregnancy. This medication is excreted in breast milk.

## 2022-05-21 NOTE — SWALLOW VFSS/MBS ASSESSMENT ADULT - LARYNGEAL PENETRATION DURING THE SWALLOW - SILENT
Mild/Trace/over the laryngeal surface of the epiglottis and arytenoids with retrieval. Trace/over the laryngeal surface of the epiglottis and arytenoids with retrieval (one episode of deep penetration). Health Care Proxy (HCP)

## 2022-08-17 NOTE — PROGRESS NOTE ADULT - NSHPATTENDINGPLANDISCUSS_GEN_ALL_CORE
the son at the bedside and the medical N.P the wilfred Meng and the medical NAlanP Notes some left knee arthritis - denies any significant pain today while here in [place]

## 2022-09-27 NOTE — PATIENT PROFILE ADULT - NSPROMUTINFOINDIVIDFT_GEN_A_NUR
[facility]  History & Physical    Anson Marx Patient Status:  [place]    2022 MRN [id number]   [facility] 1SW-N Attending Daryle Ivy, MD   Hosp Day # 0 PCP Timi Watson MD     Date of Admission:  2022    HPI:  Anson Marx is a(n) Weight: 6 lb 9.1 oz (2.98 kg) (Filed from Delivery Summary) male infant. Date of Delivery: 2022  Time of Delivery: 1:27 AM  Delivery Type: Normal spontaneous vaginal delivery    Maternal Information:  Information for the patient's mother: Don Carpenter [[id number]]  39year old  Information for the patient's mother: Don Carpenter [[id number]]      Pertinent Maternal Prenatal Labs:   Mother's Information  Mother: Don Carpenter #[id number]   Start of Mother's Information    Prenatal Results    Initial Prenatal Labs ([facility] 7-97I)     Test Value Date Time    ABO Grouping OB  O  22 1854    RH Factor OB  Positive  22 1854    Antibody Screen OB  Negative  22 1035    Rubella Titer OB  Positive  22 1035    Hep B Surf Ag OB  Nonreactive   22 1035    Serology (RPR) OB       TREP       TREP Qual  Nonreactive   22 1035    T pallidum Antibodies       HIV Result OB       HIV Combo Result       5th Gen HIV - DMG  Nonreactive   22 1035    HGB  10.5 g/dL 22 1035    HCT  34.5 % 22 1035    MCV  74.8 fL 22 1035    Platelets  595 86^4/MZ 22 1035    Urine Culture       Chlamydia with Pap  NOT DETECTED  22 1455    GC with Pap  NOT DETECTED  22 1455    Chlamydia       GC       Pap Smear       Sickel Cell Solubility HGB       HPV  Negative  20 1539    HCV         2nd Trimester Labs (GA 24-41w)     Test Value Date Time    Antibody Screen OB  Negative  22 1854    Serology (RPR) OB       HGB  11.8 g/dL 22 1854    HCT  34.3 % 22 1854    Glucose 1 hour       Glucose Kathy 3 hr Gestational Fasting       1 Hour glucose       2 Hour glucose       3 Hour glucose         3rd Trimester Labs (GA 24-41w)     Test Value Date Time    Antibody Screen OB  Negative  22    Group B Strep OB ^ Negative  22     Group B Strep Culture       GBS - DMG       HGB  11.8 g/dL 22    HCT  34.3 % 22    HIV Result OB ^ Negative  22     HIV Combo Result       5th Gen HIV - DMG       TREP  Nonreactive   22    T pallidum Antibodies       COVID19 Infection  Not Detected  22      First Trimester & Genetic Testing (GA 0-40w)     Test Value Date Time    MaternaT-21 (T13)       MaternaT-21 (T18)       MaternaT-21 (T21)       VISIBILI T (T21)       VISIBILI T (T18)       Cystic Fibrosis Screen [32]       Cystic Fibrosis Screen [165]       Cystic Fibrosis Screen [165]       Cystic Fibrosis Screen [165]       Cystic Fibrosis Screen [165]       CVS       Counsyl [T13]       Counsyl [T18]       Counsyl [T21]         Genetic Screening (GA 0-45w)     Test Value Date Time    AFP Tetra-Patient's HCG       AFP Tetra-Mom for HCG       AFP Tetra-Patient's UE3       AFP Tetra-Mom for UE3       AFP Tetra-Patient's YOBANI       AFP Tetra-Mom for YOBANI       AFP Tetra-Patient's AFP       AFP Tetra-Mom for AFP       AFP, Spina Bifida       Quad Screen ([facility])       AFP       AFP, Tetra       AFP, Serum         Legend    ^: Historical              End of Mother's Information  Mother: Quintin Canela #[id number]                Pregnancy/ Complications: noted early ultrasound choroid plexus resolved on 20 week ultrasound  GDM diet controlled     Rupture Date: 2022  Rupture Time: 9:05 PM  Rupture Type: AROM  Fluid Color: Bright Red  Induction: None  Augmentation: AROM  Complications:      Apgars:   1 minute: 9                5 minutes:9                          10 minutes:     Resuscitation:     Infant admitted to nursery via crib. Placed under warmer with temperature probe attached.  Hugs tag attached to infant lower extremity. Physical Exam:  Birth Weight: Weight: 6 lb 9.1 oz (2.98 kg) (Filed from Delivery Summary)    Gen:  Awake, alert, appropriate, nontoxic, in no apparent distress  Skin:   No rashes, no petechiae, no jaundice  HEENT:  AFOSF, no eye discharge bilaterally, neck supple, no nasal discharge, no nasal   flaring, no LAD, oral mucous membranes moist  Large caput posteriorly   Lungs:    CTA bilaterally, equal air entry, no wheezing, no coarseness  Chest:  S1, S2 no murmur  Abd:  Soft, nontender, nondistended, + bowel sounds, no HSM, no masses  Ext:  No cyanosis/edema/clubbing, peripheral pulses equal bilaterally, no clicks  Neuro:  +grasp, +suck, +taya, good tone, no focal deficits  Spine:  No sacral dimples, no guillermo noted  Hips:  Negative Ortolani's, negative Bender's, negative Galeazzi's, hip creases    symmetrical, no clicks or clunks noted  :  Nl testes descended     Labs:         Assessment:  CRUZ: 37 4/7  Weight: Weight: 6 lb 9.1 oz (2.98 kg) (Filed from Delivery Summary)  Sex: male  Caput monitor   accuchecks stable at this time    Plan: Mother's feeding plan: Exclusive Breastmilk  Routine  nursery care. Feeding: Formula needed for medical supplementation       Hepatitis B vaccine; risks and benefits discussed with mom  who expressed understanding.     Ana Brand MD none

## 2022-11-08 NOTE — PROGRESS NOTE ADULT - PROBLEM SELECTOR PLAN 4
70 yoF who p/w L flank pain, dysuria, hematuria. She was Dx w/ L UPJ stone on 11/5 at [facility]. She returns w/ continued pain. She has a Hx of Kidney stones and is sees Dr. Lois Gaming. Uro on call requested admit for OR intervention. Holding off on additional ketorolac 2/2 CrCl.  UA suggestive of UTI, infected stone? Lab systems down at this time.   UA micro pending
continue Exelon  possible floroquinolone induced psychosis  cipro DCed  may be due to change in environment  will consider psych eval
continue Exelon  Patient orineted x1  close to baseline
continue Exelon  Patient orineted x1  close to baseline
continue Exelon
continue Exelon  Patient oriented x2  close to baseline
continue Exelon  Patient oriented x2  close to baseline
continue Exelon  Patient orineted x1  close to baseline
continue Exelon  Patient orineted x1  close to baseline
continue Exelon  Patient orineted x2  close to baseline
continue Exelon  more confused today will hold cipro  may be due to change in environment  will consider psych eval
continue Exelon  possible floroquinolone induced psychosis  cipro DCed  may be due to change in environment  will consider psych eval

## 2022-11-29 NOTE — PROGRESS NOTE ADULT - SUBJECTIVE AND OBJECTIVE BOX
Patient said [employer] said there was a problem with the prednisone script that was sent over this morning  They asked to speak to someone in clinical prior to dispensing the medication  89 year old male with pmhx AFib, UTI, dementia, hypercholesteremia, HTN, CAD and pshx PCI x2, hernia repair presents to the ED s/p recent discharge from [facility] x[facility] days ago for UTI. Pt was sent in from nursing home for abnormal labs and lethargy. Reported that since yesterday, pt was more lethargic. Today, the pt had labs conducted at the nursing home, with unspecified abnormal lab values. Subsequently referred to [facility] ED, found to be hypoxic by EMS to spO2 90%, placed on 3L NC, responsive to voice. Per nursing home, pt is usually able to have a conversation and is alert. Denies fever. Upon review of lab results brought by EMS, only abnormality on CBC and CMP is a hemoglobin value of 10.9. Patient with a hx of worsening multi infarct dementia. Has had multiple episode of waxing and waning mental status Pt still very lethargic    MEDICATIONS  (STANDING):  amLODIPine   Tablet 10 milliGRAM(s) Oral daily  dextrose 5% + sodium chloride 0.45%. 1000 milliLiter(s) (50 mL/Hr) IV Continuous <Continuous>  ertapenem  IVPB 1000 milliGRAM(s) IV Intermittent every 24 hours  finasteride 5 milliGRAM(s) Oral daily  rivastigmine patch  9.5 mG/24 Hr(s) 1 Patch Transdermal every 24 hours  tamsulosin 0.4 milliGRAM(s) Oral at bedtime    MEDICATIONS  (PRN):  acetaminophen    Suspension .. 650 milliGRAM(s) Oral every 4 hours PRN Temp greater or equal to 38C (100.4F), Moderate Pain (4 - 6)  albuterol/ipratropium for Nebulization 3 milliLiter(s) Nebulizer every 6 hours PRN Shortness of Breath and/or Wheezing  polyethylene glycol 3350 17 Gram(s) Oral at bedtime PRN Constipation          VITALS:   T(C): 36.3 (12-08-19 @ 20:56), Max: 36.3 (12-08-19 @ 04:29)  HR: 59 (12-08-19 @ 20:56) (57 - 59)  BP: 140/70 (12-08-19 @ 20:56) (134/80 - 149/85)  RR: 18 (12-08-19 @ 20:56) (16 - 18)  SpO2: 96% (12-08-19 @ 20:56) (95% - 98%)  Wt(kg): --        PHYSICAL EXAM:  	GENERAL: NAD, well nourished  	HEAD:  Atraumatic  	EYES: EOM, PERRLA, conjunctiva pink and sclera white  	ENT: No tonsillar erythema, exudates, or enlargement, moist mucous membranes, good dentition, no lesions  	NECK: Supple, No JVD, normal thyroid, carotids with normal upstrokes and no bruits  	CHEST/LUNG: Clear to auscultation bilaterally, No rales, rhonchi, wheezing, or rubs  	HEART: Regular rate and rhythm, No murmurs, rubs, or gallops  	ABDOMEN: Soft, nondistended, no masses, guarding, tenderness or rebound, bowel sounds present  	EXTREMITIES:  2+ Peripheral Pulses, No clubbing, cyanosis, or edema.   	LYMPH: No lymphadenopathy noted  	SKIN: No rashes or lesions  NERVOUS SYSTEM:  confused minimally responsive  LABS:        CBC Full  -  ( 08 Dec 2019 09:21 )  WBC Count : 6.66 K/uL  RBC Count : 3.35 M/uL  Hemoglobin : 9.8 g/dL  Hematocrit : 30.8 %  Platelet Count - Automated : 163 K/uL  Mean Cell Volume : 91.9 fl  Mean Cell Hemoglobin : 29.3 pg  Mean Cell Hemoglobin Concentration : 31.8 gm/dL  Auto Neutrophil # : x  Auto Lymphocyte # : x  Auto Monocyte # : x  Auto Eosinophil # : x  Auto Basophil # : x  Auto Neutrophil % : x  Auto Lymphocyte % : x  Auto Monocyte % : x  Auto Eosinophil % : x  Auto Basophil % : x    12-08    142  |  108  |  18  ----------------------------<  260<H>  3.7   |  21<L>  |  1.05    Ca    8.7      08 Dec 2019 05:24        PT/INR - ( 08 Dec 2019 09:08 )   PT: 15.0 sec;   INR: 1.29 ratio         PTT - ( 08 Dec 2019 09:08 )  PTT:32.3 sec    CAPILLARY BLOOD GLUCOSE      POCT Blood Glucose.: 101 mg/dL (08 Dec 2019 11:56)  POCT Blood Glucose.: 113 mg/dL (08 Dec 2019 07:38)      RADIOLOGY & ADDITIONAL TESTS:

## 2022-12-03 NOTE — ED ADULT TRIAGE NOTE - NS ED NURSE BANDS TYPE
MD Marylee Bow notified of low BP and elevated troponin level.       Maynor Alanis RN  12/03/22 7832 Name band;

## 2023-10-25 NOTE — PHYSICAL THERAPY INITIAL EVALUATION ADULT - ASSISTIVE DEVICE FOR TRANSFER: STAND/SIT, REHAB EVAL
Review of Systems:   CONSTITUTIONAL: No fever  EYES: No eye pain, visual disturbances, or discharge  ENMT:  No tinnitus, vertigo; No sinus or throat pain  RESPIRATORY: No SOB. No cough, wheezing, chills or hemoptysis  CARDIOVASCULAR: No chest pain, palpitations, or leg swelling  GASTROINTESTINAL: No abdominal or epigastric pain. No nausea, vomiting, or hematemesis; No diarrhea or constipation. No melena or hematochezia.  GENITOURINARY: No dysuria, frequency, hematuria  NEUROLOGICAL: No headaches, or tremors  SKIN: No itching, burning  MUSCULOSKELETAL: No joint pain or swelling  PSYCHIATRIC: No depression, anxiety  HEME/LYMPH: No easy bruising, or bleeding gums
PT in front

## 2024-03-19 NOTE — DISCHARGE NOTE ADULT - ABILITY TO HEAR (WITH HEARING AID OR HEARING APPLIANCE IF NORMALLY USED):
Adjusted dosing due to insurance coverage.     Tresiba 25 units at 8AM  Fiasp  5 units for Lunch/ 5 units for dinner. Take right before the meal.  Hold if not eating.  Hold if sugar <80.  If high sugar >150, can give extra correction, 150-200:1units/ 201-250:2units/ 251-300:3units/ >300:4 units. Max daily dose:  22 units    Mildly to Moderately Impaired: difficulty hearing in some environments or speaker may need to increase volume or speak distinctly

## 2024-04-25 NOTE — PROGRESS NOTE ADULT - PROVIDER SPECIALTY LIST ADULT
I have approved a 90 day supply for her.  She will need an office follow-up appointment with me as her prescriber toward the end of the 90 days - by that point, she will be well established on the dose and we can see if adequate or if she needs a dose adjustment.     Internal Medicine

## 2024-07-06 NOTE — H&P ADULT - PROBLEM SELECTOR PLAN 4
0 (no pain/absence of nonverbal indicators of pain) will continue exelon and mybetriq  reorient as needed

## 2024-12-17 NOTE — PROGRESS NOTE ADULT - PROBLEM SELECTOR PROBLEM 6
Essential hypertension
Home

## 2025-02-14 NOTE — H&P ADULT - NSHPREVIEWOFSYSTEMS_GEN_ALL_CORE
Yes
89 yo male with Hx of CAD s/p PCI x2 as reported, TIA on plavix, Atrial Fibrillation on no AC , HLD, HTN, Dementia , UTI was brought in the hospital from [facility] due to report of Ghada hematuria.  AS per daughter, pt has recurrent UTI and was recently seen by Urologist who prescribed Monural for 3 doses.  Pt started having ghada hematuria started 2-3 days ago with no fever or reported abd pain.  As per PMD daughter reported abd distension.  A CBI was placed in the ED with reported of about one Liter of urine with Blood and clots .      in the ED   :  Pt received 2 L of IVF boluses and Ceftriaxone 1 Gm.     Patient to be pancultured and then start Vanco/Zosyn  - ID to adivse

## 2025-02-27 NOTE — ED ADULT NURSE NOTE - CAS TRG GENERAL NORM CIRC DET
Braxton Perales Jr. is a 85 y.o. male on day 4 of admission presenting with Abdominal mass, RUQ (right upper quadrant).    Subjective   No acute events overnight. This morning, Mr. Perales had no new complaints. He was more confused compared to yesterday (had difficulty answering his name or the year, but stated his birthday and said he is in [facility] in [place]) and may not be a reliable narrator at this time. He stated his diarrhea is improving (chart review confirms decreased output), as is the pain in his feet. He reports no other pain, no feelings of fullness, no difficulty breathing, shortness of breath, or chest pain. He reports not eating dinner last night because of decreased appetite secondary to emotional news, then he did not eat breakfast this morning as it grew cold when he was doing something else. Mr. Perales reports he would like to go home.        Objective     Physical Exam  Constitutional:       General: He is not in acute distress.     Appearance: Normal appearance.   Eyes:      General: Scleral icterus present.      Extraocular Movements: Extraocular movements intact.   Cardiovascular:      Rate and Rhythm: Normal rate and regular rhythm.      Heart sounds: Normal heart sounds. No murmur heard.     No friction rub. No gallop.      Comments: 2+ pitting edema bilateral, extending up to thighs  Pulmonary:      Effort: Pulmonary effort is normal. No respiratory distress.      Breath sounds: Normal breath sounds.   Abdominal:      General: Bowel sounds are normal. There is distension.      Tenderness: There is no abdominal tenderness. There is no guarding.      Comments: Pitting edema in the abdominal area, worse than yesterday (stethoscope used to auscultate bowel sounds left an imprint on stomach). Increased distention compared to yesterday.   Musculoskeletal:      Right lower le+ Pitting Edema present.      Left lower le+ Pitting Edema present.   Skin:     General: Skin is  "warm.      Coloration: Skin is jaundiced.      Comments: Increased jaundice compared to yesterday  Bandage on low back   Neurological:      Mental Status: He is alert.      Comments: No asterixis. Oriented to self and situation as well as +/- year   Psychiatric:         Mood and Affect: Mood normal.         Behavior: Behavior normal.         Last Recorded Vitals  Blood pressure 112/68, pulse 67, temperature 36.3 °C (97.3 °F), resp. rate 18, height 1.753 m (5' 9.02\"), weight 107 kg (235 lb 0.2 oz), SpO2 96%.  Intake/Output last 3 Shifts:  I/O last 3 completed shifts:  In: 460 (4.3 mL/kg) [P.O.:360; IV Piggyback:100]  Out: 250 (2.3 mL/kg) [Urine:250 (0.1 mL/kg/hr)]  Weight: 106.6 kg     Relevant Results  Vascular US Lower Extremity Venous Duplex Bilateral 2/24  PRELIMINARY CONCLUSIONS:  Right Lower Venous: No evidence of acute deep vein thrombus visualized in the right lower extremity.  Left Lower Venous: No evidence of acute deep vein thrombus visualized in the left lower extremity.     CTAP WO 2/27:  1. Interval development of multiple large masses throughout the  liver, in a pattern consistent with metastatic disease. Associated  small amount of ascites.  2. Enlargement of the pancreatic tail, with indistinct 3 cm mass,  suspicious for primary pancreatic malignancy.    MRCP 2/24  Impression:     1. Proximal IVC nonocclusive thrombus extending from the inferior  cavoatrial junction and possibly the right atrium proximally, and  into the left and middle hepatic veins distally, as described. A  focus of diffusion restriction within the thrombus, concerning for  tumor thrombus.  2. A 5.0 x 2.9 x 3.7 cm pancreatic tail cystic and solid  hypoenhancing mass, compatible with primary pancreatic  neoplasm/adenocarcinoma. 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  3. Innumerable hepatic hypoenhancing metastasis in both hepatic  lobes, more conglomerate on the left.  4. Focal and segmental " areas of intrahepatic biliary dilation in both  lobes, secondary to compression/involvement by the hepatic  metastasis. No extrahepatic biliary dilation.  5. A suspected filling defect within the proximal right portal vein,  concerning for small nonocclusive thrombus. However finding can also  be artifactual.  6. Heterogeneous bone marrow with indeterminate scattered  hypoenhancing foci in the lumbar vertebral bodies. Osseous metastasis  can not be excluded.  7. Moderate upper abdominal ascites.  8. Additional findings as described above.       No labs since 2/25    A/P:  Braxton Perales Jr. is a 85 y.o. male with PMH of afib on eliquis, HFpEF, HTN, CAD, aortic stenosis s/p TAVR in 2023, mitral valve regurgitation s/p repair in 2012, GURMEET on CPAP who presented to [facility] 2/24 as a transfer from [facility] ED with jaundice. Outpatient CT done for abdominal pain showing new pancreatic mass with multiple hypodense liver lesions concerning for primary pancreatic malignancy with metastatic spread to liver. Pt on antibiotics on admission empirically for abd infection with leukocytosis, however pt remained hds with no concern for acute infection so antibiotics stopped [B/C 2/20 NGTD and blood cx 2/23 NGTD, stopped zosyn (2/20-2/24) changed to  linda (2/24-2/26) iso thrombocytopenia]. MRCP 2/24 showed numerous enhancing lesions throughout the liver and mixed cystic/solid mass in the pancreatic tail, again concerning for primary pancreatic malignancy with metastatic spread to the liver. CA-19 levels >70,000 support a diagnosis of primary pancreatic malignancy. Pt not a good candidate for biopsy with IR as his platelets are <50, bleeding risk. Med Onc/Supp Onc/GI/Hospice following pt, with elevated bili not a candidate for systemic therapy and because of liver mets not a candidate for surgery. Med onc prognosis for weeks to days of life. Patient to discharge home with hospice 2/27.     MRCP also showed mass effect upon the right  hepatic vein by an adjacent mass and filling defects within the IVC concerning for tumor thrombus. D-dimer on 2/24 at 23,663, and continued elevated INR, protime, and aPTT, suggest continued thrombocytopenia, most likely related to malignancy. Continuing to hold anticoagulation iso thrombocytopenia likely DIC.     Disposition: planned for 2/27 home with hospice     Updates:  -Integrative Medicine Consult 2/27, music therapy and signed off, as pt declined other services   -RN Hospice Consult 2/27: met with patient at bedside, spouse on phone. Pt/spouse confirmed goc/poc/code status, want to maintain full code status. Patient to be discharged home with hospice care 2/27.  -Patient with increased abdominal distention and worsening edema, but reports no feelings of fullness, pain, or shortness of breath. Team discussed use of paracentesis for relief, but patient has no reported symptoms. Furthermore, he may not be a candidate considering his last platelet numbers <50 (2/25)   -stopped imodium iso developing constipation         #Pancreatic Mass with multiple hypodense liver lesions, C/f pancreatic malignancy with metastatic spread to liver  #Hyperbilirubinemia, unresolved  #Leukocytosis, unresolved  :: Alk Phos 647, , , total bili 33.8   :: CT A/P done 2/17 showing mass in pancreatic tail ~ 3 cm, innumerable hypodense lesions 5-6cm throughout the liver consistent with metastatic disease.  - B/C 2/20 NGTD and blood cx 2/23 NGTD, stopped zosyn (2/20-2/24) changed to linda (2/24-2/26)  -Discontinue meropenem 2/26, no acute infection likely   -: >70,000 2/25  -CEA: 63.0 2/25  -dc empiric meropenem 2/26 iso no acute infection (hds, chronic leukocytosis likely 2/2 malignancy, no concern for acute abdomen)  Plan:  -Follow up with Oncology outpatient (referral placed)  -GI consult 2/25: CT and MR images reviewed, no obvious dilation amenable to stenting at this time. Patient could get CT or US guided liver  biopsy for tissue diagnosis. No role for endoscopic intervention at this time. Deferred GOC discussion to primary and HemeOnc teams.   -Oncology consult 2/25: Patient not a candidate for chemotherapy due to hyperbilirubinemia that can not be intervened on per GI (no biliary tree obstructions iso severe liver dysfunction fro mets +/- DIC). Recommend hospice and also have fu outpt ordered   -Integrative Medicine Consult 2/27, music therapy and signed off, as pt declined other services   -RN Hospice Consult 2/27: met with patient at bedside, spouse on phone. Pt/spouse confirmed goc/poc/code status, want to maintain full code status. Patient to be discharged home with hospice care 2/27.  -Patient with increased abdominal distention and worsening edema, but reports no feelings of fullness, pain, or shortness of breath. Team discussed use of paracentesis for relief, but patient has no reported symptoms. Furthermore, he may not be a candidate considering his last platelet numbers <50 (2/25)      #Thrombocytopenia, unresolved  #Synthetic Liver Dysfunction  #Elevated INR  #Tumor Thrombus   #Concern for DIC  ::most likely iso malignancy, liver infiltration. No asterixis and Aox3.   - plt count stable, if thrombocytopenia worsens consider switching zosyn to meropenem (if repeat plt less than 50) -switched to meropenem 2/24, discontinued 2/26   -MRI 2/24 showed mass effect upon the right hepatic vein by an adjacent mass and filling defects within the IVC concerning for tumor thrombus  Plan:  -maintain active T&S (2/27)  -hgb>7.0, plt>10; >50 if actively bleeding  -coag, slide, LDH/hapto/retic/fibro   -if pt bleeds give cryo  -hold off on ac iso thrombocytopenia      #ROMULO, unresolved   ::baseline Cr 1.1 - 1.2, on admission 1.52  :: etiology: likely pre-renal in the setting of poor PO intake v. Contrast induced   Plan:  - CTM   - avoid nephrotoxins as able, renally dose medications  -ctm every other day labs   -pre renal from  fena and feurea ctm iso poor po intake consider fluids     #Diarrhea, improving   #Diverticulosis on CT  Plan:  -dc bowel reg  -c diff canceled iso solid stools   -stopped imodium iso developing constipation    -bedside commode in room      #Anemia, unresolved  Likely hemolytic  -d-dimer 2/24: 23,663  Plan:  -ctm every other day labs- ended 2/25     #CAD  #HFpEF  #Atrial fibrillation on AC  #Aortic Stenosis s/p TAVR 2023  #Mitral valve reguritation s/p repair 2012  #Non-sustained Vtach  #B/L LE Edema ro DVT  :: CHADSVASC: 4   :: on home ASA 81mg and eliquis  -DVT US b/l LE preliminary findings: 2/24: no evidence of acute deep vein thrombus visualized in right or left lower extremity  Plan:  -hold Eliquis iso thrombocytopenia-> can consider transition to heparin gtt iso high CHADSVASC once platelet count stable if pt remains inpt  - hold ASA iso thrombocytopenia   - keep K>4, Mg>2   - 2-3+ pitting edema but no respiratory, cardiac or BP compromise vidal. CTM  -cw compression socks      #HTN, controlled  -hold home amlodipine lisinopril-hydrochlorothiazide iso ROMULO, normotensive BP and ROMULO     #GURMEET  - continue home CPAP  -Respiratory Therapy 2/25: Pt. Declines CPAP at this time, 95% on RA     #Chronic Leg Pain   #Weakness  Plan:  -Supportive Oncology rec, gabapetin 100 mg p.o. nightly for neuropathy and dilaudid 0.1 mg every 4 hours as needed for pain. Orders updated.  -Ordered diclofenac BID on feet for neuropathy 2/26  -placed bedside commode and walker in room, encouraging increased movement as tolerated     #Poor PO Intake  Plan:  -added ensure protein tid to diet orders    F: HFpEF  E: K>4.0, M>2.0  N: reg and ensure protein tid   DVT Ppx: holding iso thrombocytopenia   GI Ppx: stopped for comfort can consider restart famotidine or pantoprazole   Access/Lines: PIV   Abx: dc meropenem 2/26  O2: RA  Code status: Full Code  NOK:Wife, Yadi Perales  Home: [phone], Cell [phone]      BRIAN BALDERRAMA,  MS3    Katarina Patricia MD, MPH  Internal Medicine, Intern    Capillary refill less/equal to 2 seconds/Strong peripheral pulses